# Patient Record
Sex: FEMALE | Employment: OTHER
[De-identification: names, ages, dates, MRNs, and addresses within clinical notes are randomized per-mention and may not be internally consistent; named-entity substitution may affect disease eponyms.]

---

## 2017-01-26 ENCOUNTER — CHARTING TRANS (OUTPATIENT)
Dept: OTHER | Age: 82
End: 2017-01-26

## 2017-02-01 ENCOUNTER — CHARTING TRANS (OUTPATIENT)
Dept: OTHER | Age: 82
End: 2017-02-01

## 2017-02-27 ENCOUNTER — CHARTING TRANS (OUTPATIENT)
Dept: OTHER | Age: 82
End: 2017-02-27

## 2017-05-01 ENCOUNTER — LAB SERVICES (OUTPATIENT)
Dept: OTHER | Age: 82
End: 2017-05-01

## 2017-05-02 ENCOUNTER — CHARTING TRANS (OUTPATIENT)
Dept: OTHER | Age: 82
End: 2017-05-02

## 2017-05-03 ENCOUNTER — LAB SERVICES (OUTPATIENT)
Dept: OTHER | Age: 82
End: 2017-05-03

## 2017-05-03 ENCOUNTER — CHARTING TRANS (OUTPATIENT)
Dept: OTHER | Age: 82
End: 2017-05-03

## 2017-05-03 LAB
CULTURE URINE: NORMAL
Lab: NORMAL
RAPID FLU PCR A AND B: NORMAL

## 2017-05-05 LAB — CULTURE GROUP A STREP: NORMAL

## 2017-05-06 LAB
CULTURE BLOOD: NORMAL
CULTURE BLOOD: NORMAL

## 2017-05-18 ENCOUNTER — CHARTING TRANS (OUTPATIENT)
Dept: OTHER | Age: 82
End: 2017-05-18

## 2017-05-22 ENCOUNTER — CHARTING TRANS (OUTPATIENT)
Dept: OTHER | Age: 82
End: 2017-05-22

## 2017-05-26 ENCOUNTER — CHARTING TRANS (OUTPATIENT)
Dept: OTHER | Age: 82
End: 2017-05-26

## 2017-05-31 ENCOUNTER — CHARTING TRANS (OUTPATIENT)
Dept: OTHER | Age: 82
End: 2017-05-31

## 2017-06-02 ENCOUNTER — CHARTING TRANS (OUTPATIENT)
Dept: OTHER | Age: 82
End: 2017-06-02

## 2017-06-07 ENCOUNTER — CHARTING TRANS (OUTPATIENT)
Dept: OTHER | Age: 82
End: 2017-06-07

## 2017-06-09 ENCOUNTER — CHARTING TRANS (OUTPATIENT)
Dept: OTHER | Age: 82
End: 2017-06-09

## 2018-11-23 ENCOUNTER — IMAGING SERVICES (OUTPATIENT)
Dept: OTHER | Age: 83
End: 2018-11-23

## 2018-11-28 VITALS
BODY MASS INDEX: 30.91 KG/M2 | WEIGHT: 168 LBS | OXYGEN SATURATION: 98 % | DIASTOLIC BLOOD PRESSURE: 63 MMHG | TEMPERATURE: 97.6 F | RESPIRATION RATE: 18 BRPM | HEIGHT: 62 IN | SYSTOLIC BLOOD PRESSURE: 109 MMHG | HEART RATE: 86 BPM

## 2018-11-28 VITALS
DIASTOLIC BLOOD PRESSURE: 68 MMHG | TEMPERATURE: 97.2 F | OXYGEN SATURATION: 98 % | SYSTOLIC BLOOD PRESSURE: 132 MMHG | RESPIRATION RATE: 18 BRPM | HEART RATE: 79 BPM

## 2018-11-28 VITALS
BODY MASS INDEX: 30.91 KG/M2 | WEIGHT: 168 LBS | TEMPERATURE: 98 F | DIASTOLIC BLOOD PRESSURE: 71 MMHG | RESPIRATION RATE: 14 BRPM | OXYGEN SATURATION: 98 % | HEIGHT: 62 IN | HEART RATE: 92 BPM | SYSTOLIC BLOOD PRESSURE: 130 MMHG

## 2018-11-28 VITALS
WEIGHT: 154 LBS | RESPIRATION RATE: 20 BRPM | HEART RATE: 86 BPM | HEIGHT: 62 IN | TEMPERATURE: 98.2 F | OXYGEN SATURATION: 97 % | DIASTOLIC BLOOD PRESSURE: 67 MMHG | BODY MASS INDEX: 28.34 KG/M2 | SYSTOLIC BLOOD PRESSURE: 131 MMHG

## 2018-11-28 VITALS
OXYGEN SATURATION: 98 % | DIASTOLIC BLOOD PRESSURE: 64 MMHG | HEART RATE: 74 BPM | SYSTOLIC BLOOD PRESSURE: 114 MMHG | RESPIRATION RATE: 20 BRPM | TEMPERATURE: 97.8 F

## 2018-11-28 VITALS
OXYGEN SATURATION: 97 % | DIASTOLIC BLOOD PRESSURE: 70 MMHG | RESPIRATION RATE: 18 BRPM | HEIGHT: 62 IN | SYSTOLIC BLOOD PRESSURE: 135 MMHG | HEART RATE: 80 BPM | BODY MASS INDEX: 28.34 KG/M2 | WEIGHT: 154 LBS | TEMPERATURE: 97.4 F

## 2018-12-13 ENCOUNTER — TELEPHONE (OUTPATIENT)
Dept: OPHTHALMOLOGY | Age: 83
End: 2018-12-13

## 2018-12-13 ENCOUNTER — OFFICE VISIT (OUTPATIENT)
Dept: OPHTHALMOLOGY | Age: 83
End: 2018-12-13

## 2018-12-13 DIAGNOSIS — H02.89 DISTICHIASIS: Primary | ICD-10-CM

## 2018-12-13 DIAGNOSIS — Z96.1 PSEUDOPHAKIA OF BOTH EYES: ICD-10-CM

## 2018-12-13 PROCEDURE — 67820 REVISE EYELASHES: CPT | Performed by: OPHTHALMOLOGY

## 2018-12-13 PROCEDURE — 92004 COMPRE OPH EXAM NEW PT 1/>: CPT | Performed by: OPHTHALMOLOGY

## 2018-12-13 RX ORDER — ALBUTEROL SULFATE 2.5 MG/3ML
SOLUTION RESPIRATORY (INHALATION)
COMMUNITY

## 2018-12-13 RX ORDER — FUROSEMIDE 20 MG/1
20 TABLET ORAL
COMMUNITY

## 2018-12-13 RX ORDER — IPRATROPIUM BROMIDE 42 UG/1
2 SPRAY, METERED NASAL
COMMUNITY
Start: 2018-04-25

## 2018-12-13 RX ORDER — POTASSIUM CHLORIDE 750 MG/1
10 TABLET, EXTENDED RELEASE ORAL
COMMUNITY

## 2018-12-28 ENCOUNTER — OFFICE VISIT (OUTPATIENT)
Dept: OPHTHALMOLOGY | Age: 83
End: 2018-12-28

## 2018-12-28 DIAGNOSIS — H02.89 DISTICHIASIS: Primary | ICD-10-CM

## 2018-12-28 PROCEDURE — 99213 OFFICE O/P EST LOW 20 MIN: CPT | Performed by: OPHTHALMOLOGY

## 2019-01-02 ENCOUNTER — TELEPHONE (OUTPATIENT)
Dept: OPHTHALMOLOGY | Age: 84
End: 2019-01-02

## 2019-01-24 ENCOUNTER — IMAGING SERVICES (OUTPATIENT)
Dept: OTHER | Age: 84
End: 2019-01-24

## 2019-02-19 ENCOUNTER — APPOINTMENT (OUTPATIENT)
Dept: OPHTHALMOLOGY | Age: 84
End: 2019-02-19

## 2019-02-20 ENCOUNTER — APPOINTMENT (OUTPATIENT)
Dept: OPHTHALMOLOGY | Age: 84
End: 2019-02-20

## 2019-05-20 ENCOUNTER — TELEPHONE (OUTPATIENT)
Dept: OPHTHALMOLOGY | Age: 84
End: 2019-05-20

## 2019-05-30 ENCOUNTER — OFFICE VISIT (OUTPATIENT)
Dept: OPHTHALMOLOGY | Age: 84
End: 2019-05-30

## 2019-05-30 DIAGNOSIS — H02.89 DISTICHIASIS: Primary | ICD-10-CM

## 2019-05-30 PROCEDURE — 99213 OFFICE O/P EST LOW 20 MIN: CPT | Performed by: OPHTHALMOLOGY

## 2019-06-26 ENCOUNTER — OFFICE VISIT (OUTPATIENT)
Dept: OPHTHALMOLOGY | Age: 84
End: 2019-06-26

## 2019-06-26 DIAGNOSIS — H02.89 DISTICHIASIS: Primary | ICD-10-CM

## 2019-06-26 PROCEDURE — 99212 OFFICE O/P EST SF 10 MIN: CPT | Performed by: OPHTHALMOLOGY

## 2020-12-02 ENCOUNTER — TELEPHONE (OUTPATIENT)
Dept: RHEUMATOLOGY | Age: 85
End: 2020-12-02

## 2021-06-07 ENCOUNTER — TELEPHONE (OUTPATIENT)
Dept: OPHTHALMOLOGY | Age: 86
End: 2021-06-07

## 2021-06-09 ENCOUNTER — OFFICE VISIT (OUTPATIENT)
Dept: OPTOMETRY | Age: 86
End: 2021-06-09

## 2021-06-09 DIAGNOSIS — H02.89 DISTICHIASIS: Primary | ICD-10-CM

## 2021-06-09 PROCEDURE — 99202 OFFICE O/P NEW SF 15 MIN: CPT | Performed by: OPTOMETRIST

## 2021-06-09 ASSESSMENT — REFRACTION_WEARINGRX
OS_SPHERE: -0.75
OD_AXIS: 001
OD_SPHERE: -0.75
OS_CYLINDER: +1.50
OD_CYLINDER: +1.25
OS_AXIS: 005
SPECS_TYPE: SINGLE VISION

## 2021-06-09 ASSESSMENT — VISUAL ACUITY
CORRECTION_TYPE: GLASSES
OS_PH_CC: 20/NI
METHOD: SNELLEN - LINEAR
OS_CC: 20/80
OS_CC+: +2
OD_PH_CC: 20/NI
OD_CC: 20/CF

## 2021-06-09 ASSESSMENT — EXTERNAL EXAM - LEFT EYE: OS_EXAM: NORMAL

## 2021-06-09 ASSESSMENT — EXTERNAL EXAM - RIGHT EYE: OD_EXAM: NORMAL

## 2021-06-17 ENCOUNTER — TELEPHONE (OUTPATIENT)
Dept: OPTOMETRY | Age: 86
End: 2021-06-17

## 2021-06-18 ENCOUNTER — OFFICE VISIT (OUTPATIENT)
Dept: OPTOMETRY | Age: 86
End: 2021-06-18

## 2021-06-18 DIAGNOSIS — H02.056 TRICHIASIS OF LEFT EYE WITHOUT ENTROPION: ICD-10-CM

## 2021-06-18 DIAGNOSIS — H02.053 TRICHIASIS OF RIGHT EYE WITHOUT ENTROPION: ICD-10-CM

## 2021-06-18 DIAGNOSIS — S05.8X2A SUPERFICIAL INJURY OF LEFT CORNEA, INITIAL ENCOUNTER: Primary | ICD-10-CM

## 2021-06-18 PROCEDURE — 99213 OFFICE O/P EST LOW 20 MIN: CPT | Performed by: OPTOMETRIST

## 2021-06-18 RX ORDER — OFLOXACIN 3 MG/ML
SOLUTION/ DROPS OPHTHALMIC
Qty: 5 ML | Refills: 0 | Status: SHIPPED | OUTPATIENT
Start: 2021-06-18

## 2021-06-18 ASSESSMENT — VISUAL ACUITY
OD_SC+: -2
OD_SC: 20/60
OS_SC: 20/80
METHOD: SNELLEN - LINEAR

## 2021-07-19 ENCOUNTER — TELEPHONE (OUTPATIENT)
Dept: OPTOMETRY | Age: 86
End: 2021-07-19

## 2021-07-20 ENCOUNTER — OFFICE VISIT (OUTPATIENT)
Dept: OPHTHALMOLOGY | Age: 86
End: 2021-07-20

## 2021-07-20 ENCOUNTER — OFFICE VISIT (OUTPATIENT)
Dept: OPTOMETRY | Age: 86
End: 2021-07-20

## 2021-07-20 DIAGNOSIS — S05.02XA ABRASION OF LEFT CORNEA, INITIAL ENCOUNTER: Primary | ICD-10-CM

## 2021-07-20 DIAGNOSIS — T15.11XA FOREIGN BODY OF RIGHT CONJUNCTIVAL SAC, INITIAL ENCOUNTER: ICD-10-CM

## 2021-07-20 DIAGNOSIS — S05.01XA ABRASION OF RIGHT CORNEA, INITIAL ENCOUNTER: Primary | ICD-10-CM

## 2021-07-20 DIAGNOSIS — H02.056 TRICHIASIS OF LEFT EYE WITHOUT ENTROPION: ICD-10-CM

## 2021-07-20 PROCEDURE — 99214 OFFICE O/P EST MOD 30 MIN: CPT | Performed by: OPHTHALMOLOGY

## 2021-07-20 PROCEDURE — 65210 REMOVE FOREIGN BODY FROM EYE: CPT | Performed by: OPHTHALMOLOGY

## 2021-07-20 PROCEDURE — 99212 OFFICE O/P EST SF 10 MIN: CPT | Performed by: OPTOMETRIST

## 2021-07-20 ASSESSMENT — SLIT LAMP EXAM - LIDS: COMMENTS: NORMAL

## 2021-07-20 ASSESSMENT — VISUAL ACUITY
OS_CC+: +2
OS_CC: 20/80
METHOD: SNELLEN - LINEAR
CORRECTION_TYPE: GLASSES

## 2021-07-23 ENCOUNTER — OFFICE VISIT (OUTPATIENT)
Dept: OPHTHALMOLOGY | Age: 86
End: 2021-07-23

## 2021-07-23 DIAGNOSIS — H02.89 DISTICHIASIS: Primary | ICD-10-CM

## 2021-07-23 PROCEDURE — 99213 OFFICE O/P EST LOW 20 MIN: CPT | Performed by: OPHTHALMOLOGY

## 2021-07-29 ENCOUNTER — TELEPHONE (OUTPATIENT)
Dept: OPTOMETRY | Age: 86
End: 2021-07-29

## 2021-07-29 ENCOUNTER — OFFICE VISIT (OUTPATIENT)
Dept: OPHTHALMOLOGY | Age: 86
End: 2021-07-29

## 2021-07-29 DIAGNOSIS — H02.053 TRICHIASIS OF RIGHT EYE WITHOUT ENTROPION: ICD-10-CM

## 2021-07-29 DIAGNOSIS — H02.056 TRICHIASIS OF LEFT EYE WITHOUT ENTROPION: Primary | ICD-10-CM

## 2021-07-29 PROCEDURE — 99213 OFFICE O/P EST LOW 20 MIN: CPT | Performed by: OPHTHALMOLOGY

## 2021-07-29 PROCEDURE — 67820 REVISE EYELASHES: CPT | Performed by: OPHTHALMOLOGY

## 2021-11-03 ENCOUNTER — OFFICE VISIT (OUTPATIENT)
Dept: OPTOMETRY | Age: 86
End: 2021-11-03

## 2021-11-03 ENCOUNTER — APPOINTMENT (OUTPATIENT)
Dept: OPTOMETRY | Age: 86
End: 2021-11-03

## 2021-11-03 ENCOUNTER — TELEPHONE (OUTPATIENT)
Dept: OPTOMETRY | Age: 86
End: 2021-11-03

## 2021-11-03 DIAGNOSIS — H02.056 TRICHIASIS OF LEFT EYE WITHOUT ENTROPION: Primary | ICD-10-CM

## 2021-11-03 DIAGNOSIS — H02.053 TRICHIASIS OF RIGHT EYE WITHOUT ENTROPION: ICD-10-CM

## 2021-11-03 PROCEDURE — 67820 REVISE EYELASHES: CPT | Performed by: OPTOMETRIST

## 2021-11-03 ASSESSMENT — VISUAL ACUITY
OD_CC: CF
METHOD: SNELLEN - LINEAR
OS_CC: 20/400

## 2021-11-03 ASSESSMENT — EXTERNAL EXAM - RIGHT EYE: OD_EXAM: NORMAL

## 2022-06-02 ENCOUNTER — OFFICE VISIT (OUTPATIENT)
Dept: OPHTHALMOLOGY | Age: 87
End: 2022-06-02

## 2022-06-02 DIAGNOSIS — H02.054 TRICHIASIS OF LEFT UPPER EYELID: Primary | ICD-10-CM

## 2022-06-02 DIAGNOSIS — Z96.1 PSEUDOPHAKIA OF BOTH EYES: ICD-10-CM

## 2022-06-02 DIAGNOSIS — H26.491 PCO (POSTERIOR CAPSULAR OPACIFICATION), RIGHT: ICD-10-CM

## 2022-06-02 DIAGNOSIS — H02.889 MGD (MEIBOMIAN GLAND DYSFUNCTION): ICD-10-CM

## 2022-06-02 PROCEDURE — 99213 OFFICE O/P EST LOW 20 MIN: CPT | Performed by: OPHTHALMOLOGY

## 2022-06-02 PROCEDURE — 67820 REVISE EYELASHES: CPT | Performed by: OPHTHALMOLOGY

## 2022-06-02 ASSESSMENT — REFRACTION_WEARINGRX
OS_AXIS: 004
OD_SPHERE: -0.50
SPECS_TYPE: DISTANCE SINGLE VISION
OD_AXIS: 178
OD_CYLINDER: +1.00
OS_SPHERE: -0.50
OS_CYLINDER: +1.25

## 2022-06-02 ASSESSMENT — CONF VISUAL FIELD
OD_NORMAL: 1
METHOD: COUNTING FINGERS
OS_NORMAL: 1

## 2022-06-02 ASSESSMENT — VISUAL ACUITY
OS_CC: 20/50
OD_PH_CC: 20/50
CORRECTION_TYPE: GLASSES
OS_PH_CC: 20/NA
METHOD: SNELLEN - LINEAR
OD_CC: 20/80
OD_PH_CC+: -1

## 2022-06-02 ASSESSMENT — SLIT LAMP EXAM - LIDS: COMMENTS: MEIBOMIAN GLAND DYSFUNCTION

## 2022-06-02 ASSESSMENT — EXTERNAL EXAM - RIGHT EYE: OD_EXAM: NORMAL

## 2022-06-02 ASSESSMENT — EXTERNAL EXAM - LEFT EYE: OS_EXAM: NORMAL

## 2022-08-03 ENCOUNTER — APPOINTMENT (OUTPATIENT)
Dept: OPHTHALMOLOGY | Age: 87
End: 2022-08-03

## 2022-09-29 ENCOUNTER — TELEPHONE (OUTPATIENT)
Dept: OPHTHALMOLOGY | Age: 87
End: 2022-09-29

## 2023-06-05 ENCOUNTER — WALK IN (OUTPATIENT)
Dept: URGENT CARE | Age: 88
End: 2023-06-05

## 2023-06-05 VITALS
BODY MASS INDEX: 27.6 KG/M2 | HEART RATE: 96 BPM | RESPIRATION RATE: 16 BRPM | DIASTOLIC BLOOD PRESSURE: 80 MMHG | TEMPERATURE: 97.8 F | WEIGHT: 150 LBS | HEIGHT: 62 IN | SYSTOLIC BLOOD PRESSURE: 130 MMHG

## 2023-06-05 DIAGNOSIS — H61.22 IMPACTED CERUMEN OF LEFT EAR: Primary | ICD-10-CM

## 2023-06-05 DIAGNOSIS — H92.09 EARACHE: ICD-10-CM

## 2023-06-05 PROCEDURE — 99202 OFFICE O/P NEW SF 15 MIN: CPT | Performed by: NURSE PRACTITIONER

## 2023-06-05 RX ORDER — NEOMYCIN SULFATE, POLYMYXIN B SULFATE AND GRAMICIDIN 1.75; 10000; .025 MG/ML; [USP'U]/ML; MG/ML
SOLUTION/ DROPS OPHTHALMIC
COMMUNITY
Start: 2023-05-23

## 2023-06-05 RX ORDER — CARVEDILOL 3.12 MG/1
TABLET ORAL
COMMUNITY

## 2023-06-05 RX ORDER — CITALOPRAM HYDROBROMIDE 10 MG/1
TABLET ORAL
COMMUNITY
Start: 2023-05-17

## 2023-06-05 RX ORDER — ESTRADIOL 0.1 MG/G
1 CREAM VAGINAL DAILY
COMMUNITY

## 2023-06-05 RX ORDER — SIMVASTATIN 20 MG
20 TABLET ORAL
COMMUNITY

## 2023-06-05 RX ORDER — CYANOCOBALAMIN 1000 UG/ML
INJECTION, SOLUTION INTRAMUSCULAR; SUBCUTANEOUS
COMMUNITY

## 2023-06-05 ASSESSMENT — ENCOUNTER SYMPTOMS
LIGHT-HEADEDNESS: 0
UNEXPECTED WEIGHT CHANGE: 0
FACIAL SWELLING: 0
DIZZINESS: 0
VOMITING: 0
COUGH: 0
SINUS PRESSURE: 0
RHINORRHEA: 0
DIAPHORESIS: 0
TROUBLE SWALLOWING: 0
FATIGUE: 0
EYES NEGATIVE: 1
FEVER: 0
ACTIVITY CHANGE: 0
HEADACHES: 0
CHILLS: 0
ABDOMINAL PAIN: 0
SINUS PAIN: 0
APPETITE CHANGE: 0
SORE THROAT: 0
DIARRHEA: 0

## 2023-06-05 ASSESSMENT — PAIN SCALES - GENERAL: PAINLEVEL: 2

## 2023-12-10 ENCOUNTER — HOSPITAL ENCOUNTER (EMERGENCY)
Age: 88
Discharge: HOME | End: 2023-12-10
Attending: EMERGENCY MEDICINE
Payer: MEDICARE

## 2023-12-10 ENCOUNTER — APPOINTMENT (OUTPATIENT)
Dept: GENERAL RADIOLOGY | Age: 88
End: 2023-12-10
Payer: MEDICARE

## 2023-12-10 VITALS
OXYGEN SATURATION: 98 % | HEIGHT: 62 IN | TEMPERATURE: 99 F | HEART RATE: 75 BPM | SYSTOLIC BLOOD PRESSURE: 126 MMHG | RESPIRATION RATE: 18 BRPM | WEIGHT: 151 LBS | BODY MASS INDEX: 27.79 KG/M2 | DIASTOLIC BLOOD PRESSURE: 57 MMHG

## 2023-12-10 DIAGNOSIS — F41.8 SITUATIONAL ANXIETY: ICD-10-CM

## 2023-12-10 DIAGNOSIS — R07.89 CHEST PAIN, ATYPICAL: Primary | ICD-10-CM

## 2023-12-10 LAB
ALBUMIN SERPL-MCNC: 3.3 G/DL (ref 3.4–5)
ALBUMIN/GLOB SERPL: 0.8 {RATIO} (ref 1–2)
ALP LIVER SERPL-CCNC: 70 U/L
ALT SERPL-CCNC: 14 U/L
ANION GAP SERPL CALC-SCNC: 4 MMOL/L (ref 0–18)
AST SERPL-CCNC: 18 U/L (ref 15–37)
ATRIAL RATE: 79 BPM
BASOPHILS # BLD AUTO: 0.05 X10(3) UL (ref 0–0.2)
BASOPHILS NFR BLD AUTO: 0.8 %
BILIRUB SERPL-MCNC: 0.3 MG/DL (ref 0.1–2)
BUN BLD-MCNC: 19 MG/DL (ref 9–23)
CALCIUM BLD-MCNC: 9 MG/DL (ref 8.5–10.1)
CHLORIDE SERPL-SCNC: 104 MMOL/L (ref 98–112)
CO2 SERPL-SCNC: 26 MMOL/L (ref 21–32)
CREAT BLD-MCNC: 1.27 MG/DL
EGFRCR SERPLBLD CKD-EPI 2021: 39 ML/MIN/1.73M2 (ref 60–?)
EOSINOPHIL # BLD AUTO: 0.29 X10(3) UL (ref 0–0.7)
EOSINOPHIL NFR BLD AUTO: 4.9 %
ERYTHROCYTE [DISTWIDTH] IN BLOOD BY AUTOMATED COUNT: 13.5 %
GLOBULIN PLAS-MCNC: 4.1 G/DL (ref 2.8–4.4)
GLUCOSE BLD-MCNC: 105 MG/DL (ref 70–99)
HCT VFR BLD AUTO: 33.2 %
HGB BLD-MCNC: 11 G/DL
IMM GRANULOCYTES # BLD AUTO: 0.05 X10(3) UL (ref 0–1)
IMM GRANULOCYTES NFR BLD: 0.8 %
LYMPHOCYTES # BLD AUTO: 2.71 X10(3) UL (ref 1–4)
LYMPHOCYTES NFR BLD AUTO: 46 %
MCH RBC QN AUTO: 31 PG (ref 26–34)
MCHC RBC AUTO-ENTMCNC: 33.1 G/DL (ref 31–37)
MCV RBC AUTO: 93.5 FL
MONOCYTES # BLD AUTO: 0.47 X10(3) UL (ref 0.1–1)
MONOCYTES NFR BLD AUTO: 8 %
NEUTROPHILS # BLD AUTO: 2.32 X10 (3) UL (ref 1.5–7.7)
NEUTROPHILS # BLD AUTO: 2.32 X10(3) UL (ref 1.5–7.7)
NEUTROPHILS NFR BLD AUTO: 39.5 %
OSMOLALITY SERPL CALC.SUM OF ELEC: 281 MOSM/KG (ref 275–295)
P AXIS: 34 DEGREES
P-R INTERVAL: 170 MS
PLATELET # BLD AUTO: 198 10(3)UL (ref 150–450)
POTASSIUM SERPL-SCNC: 4.4 MMOL/L (ref 3.5–5.1)
PROT SERPL-MCNC: 7.4 G/DL (ref 6.4–8.2)
Q-T INTERVAL: 370 MS
QRS DURATION: 82 MS
QTC CALCULATION (BEZET): 424 MS
R AXIS: 31 DEGREES
RBC # BLD AUTO: 3.55 X10(6)UL
SODIUM SERPL-SCNC: 134 MMOL/L (ref 136–145)
T AXIS: 53 DEGREES
TROPONIN I SERPL HS-MCNC: 24 NG/L
VENTRICULAR RATE: 79 BPM
WBC # BLD AUTO: 5.9 X10(3) UL (ref 4–11)

## 2023-12-10 PROCEDURE — 96374 THER/PROPH/DIAG INJ IV PUSH: CPT

## 2023-12-10 PROCEDURE — 93005 ELECTROCARDIOGRAM TRACING: CPT

## 2023-12-10 PROCEDURE — 80053 COMPREHEN METABOLIC PANEL: CPT | Performed by: EMERGENCY MEDICINE

## 2023-12-10 PROCEDURE — 99284 EMERGENCY DEPT VISIT MOD MDM: CPT

## 2023-12-10 PROCEDURE — 71045 X-RAY EXAM CHEST 1 VIEW: CPT | Performed by: EMERGENCY MEDICINE

## 2023-12-10 PROCEDURE — 93010 ELECTROCARDIOGRAM REPORT: CPT

## 2023-12-10 PROCEDURE — 84484 ASSAY OF TROPONIN QUANT: CPT | Performed by: EMERGENCY MEDICINE

## 2023-12-10 PROCEDURE — 99285 EMERGENCY DEPT VISIT HI MDM: CPT

## 2023-12-10 PROCEDURE — 85025 COMPLETE CBC W/AUTO DIFF WBC: CPT | Performed by: EMERGENCY MEDICINE

## 2023-12-10 RX ORDER — FUROSEMIDE 10 MG/ML
20 INJECTION INTRAMUSCULAR; INTRAVENOUS ONCE
Status: COMPLETED | OUTPATIENT
Start: 2023-12-10 | End: 2023-12-10

## 2023-12-10 NOTE — DISCHARGE INSTRUCTIONS
Take 1000 mg acetaminophen (2 Tylenol tablets) every 6 hours as needed for pain. Increase the Lasix from 20 to 40 mg in the morning.

## 2024-04-16 ENCOUNTER — HOSPITAL ENCOUNTER (INPATIENT)
Facility: HOSPITAL | Age: 89
LOS: 4 days | Discharge: HOME HEALTH | End: 2024-04-21
Attending: EMERGENCY MEDICINE | Admitting: HOSPITALIST
Payer: MEDICARE

## 2024-04-16 ENCOUNTER — APPOINTMENT (OUTPATIENT)
Dept: GENERAL RADIOLOGY | Facility: HOSPITAL | Age: 89
End: 2024-04-16
Attending: EMERGENCY MEDICINE
Payer: MEDICARE

## 2024-04-16 DIAGNOSIS — D64.9 ANEMIA, UNSPECIFIED TYPE: ICD-10-CM

## 2024-04-16 DIAGNOSIS — I50.9 ACUTE ON CHRONIC CONGESTIVE HEART FAILURE, UNSPECIFIED HEART FAILURE TYPE (HCC): ICD-10-CM

## 2024-04-16 DIAGNOSIS — I48.91 ATRIAL FIBRILLATION WITH RAPID VENTRICULAR RESPONSE (HCC): Primary | ICD-10-CM

## 2024-04-16 LAB
ALBUMIN SERPL-MCNC: 3.4 G/DL (ref 3.4–5)
ALBUMIN/GLOB SERPL: 0.7 {RATIO} (ref 1–2)
ALP LIVER SERPL-CCNC: 82 U/L
ALT SERPL-CCNC: 19 U/L
ANION GAP SERPL CALC-SCNC: 10 MMOL/L (ref 0–18)
APTT PPP: 43.8 SECONDS (ref 23.3–35.6)
AST SERPL-CCNC: 21 U/L (ref 15–37)
BASOPHILS # BLD AUTO: 0.05 X10(3) UL (ref 0–0.2)
BASOPHILS NFR BLD AUTO: 0.9 %
BILIRUB SERPL-MCNC: 0.3 MG/DL (ref 0.1–2)
BUN BLD-MCNC: 20 MG/DL (ref 9–23)
CALCIUM BLD-MCNC: 9.1 MG/DL (ref 8.5–10.1)
CHLORIDE SERPL-SCNC: 104 MMOL/L (ref 98–112)
CO2 SERPL-SCNC: 19 MMOL/L (ref 21–32)
CREAT BLD-MCNC: 1.17 MG/DL
EGFRCR SERPLBLD CKD-EPI 2021: 43 ML/MIN/1.73M2 (ref 60–?)
EOSINOPHIL # BLD AUTO: 0.31 X10(3) UL (ref 0–0.7)
EOSINOPHIL NFR BLD AUTO: 5.4 %
ERYTHROCYTE [DISTWIDTH] IN BLOOD BY AUTOMATED COUNT: 13.3 %
GLOBULIN PLAS-MCNC: 4.7 G/DL (ref 2.8–4.4)
GLUCOSE BLD-MCNC: 172 MG/DL (ref 70–99)
HCT VFR BLD AUTO: 34 %
HGB BLD-MCNC: 11.6 G/DL
IMM GRANULOCYTES # BLD AUTO: 0.04 X10(3) UL (ref 0–1)
IMM GRANULOCYTES NFR BLD: 0.7 %
INR BLD: 1.09 (ref 0.8–1.2)
LYMPHOCYTES # BLD AUTO: 2.22 X10(3) UL (ref 1–4)
LYMPHOCYTES NFR BLD AUTO: 38.7 %
MAGNESIUM SERPL-MCNC: 2.1 MG/DL (ref 1.6–2.6)
MCH RBC QN AUTO: 31.6 PG (ref 26–34)
MCHC RBC AUTO-ENTMCNC: 34.1 G/DL (ref 31–37)
MCV RBC AUTO: 92.6 FL
MONOCYTES # BLD AUTO: 0.35 X10(3) UL (ref 0.1–1)
MONOCYTES NFR BLD AUTO: 6.1 %
NEUTROPHILS # BLD AUTO: 2.76 X10 (3) UL (ref 1.5–7.7)
NEUTROPHILS # BLD AUTO: 2.76 X10(3) UL (ref 1.5–7.7)
NEUTROPHILS NFR BLD AUTO: 48.2 %
NT-PROBNP SERPL-MCNC: 5950 PG/ML (ref ?–450)
OSMOLALITY SERPL CALC.SUM OF ELEC: 283 MOSM/KG (ref 275–295)
PLATELET # BLD AUTO: 197 10(3)UL (ref 150–450)
POTASSIUM SERPL-SCNC: 4.1 MMOL/L (ref 3.5–5.1)
PROT SERPL-MCNC: 8.1 G/DL (ref 6.4–8.2)
PROTHROMBIN TIME: 14.2 SECONDS (ref 11.6–14.8)
RBC # BLD AUTO: 3.67 X10(6)UL
SODIUM SERPL-SCNC: 133 MMOL/L (ref 136–145)
TROPONIN I SERPL HS-MCNC: 11 NG/L
TSI SER-ACNC: 2.94 MIU/ML (ref 0.36–3.74)
WBC # BLD AUTO: 5.7 X10(3) UL (ref 4–11)

## 2024-04-16 PROCEDURE — 71045 X-RAY EXAM CHEST 1 VIEW: CPT | Performed by: EMERGENCY MEDICINE

## 2024-04-16 PROCEDURE — 99223 1ST HOSP IP/OBS HIGH 75: CPT | Performed by: HOSPITALIST

## 2024-04-16 RX ORDER — HEPARIN SODIUM AND DEXTROSE 10000; 5 [USP'U]/100ML; G/100ML
INJECTION INTRAVENOUS CONTINUOUS
Status: DISCONTINUED | OUTPATIENT
Start: 2024-04-17 | End: 2024-04-18

## 2024-04-16 RX ORDER — HEPARIN SODIUM 1000 [USP'U]/ML
60 INJECTION, SOLUTION INTRAVENOUS; SUBCUTANEOUS ONCE
Status: COMPLETED | OUTPATIENT
Start: 2024-04-16 | End: 2024-04-16

## 2024-04-16 RX ORDER — HEPARIN SODIUM AND DEXTROSE 10000; 5 [USP'U]/100ML; G/100ML
12 INJECTION INTRAVENOUS ONCE
Status: COMPLETED | OUTPATIENT
Start: 2024-04-16 | End: 2024-04-17

## 2024-04-16 RX ORDER — FUROSEMIDE 10 MG/ML
20 INJECTION INTRAMUSCULAR; INTRAVENOUS ONCE
Status: COMPLETED | OUTPATIENT
Start: 2024-04-16 | End: 2024-04-16

## 2024-04-17 PROBLEM — I50.9 ACUTE ON CHRONIC CONGESTIVE HEART FAILURE, UNSPECIFIED HEART FAILURE TYPE (HCC): Status: ACTIVE | Noted: 2024-04-17

## 2024-04-17 PROBLEM — D64.9 ANEMIA, UNSPECIFIED TYPE: Status: ACTIVE | Noted: 2024-04-17

## 2024-04-17 LAB
ANION GAP SERPL CALC-SCNC: 8 MMOL/L (ref 0–18)
APTT PPP: 115.8 SECONDS (ref 23.3–35.6)
APTT PPP: 48 SECONDS (ref 23.3–35.6)
APTT PPP: 98.5 SECONDS (ref 23.3–35.6)
BILIRUB UR QL STRIP.AUTO: NEGATIVE
BUN BLD-MCNC: 18 MG/DL (ref 9–23)
CALCIUM BLD-MCNC: 8.8 MG/DL (ref 8.5–10.1)
CHLORIDE SERPL-SCNC: 107 MMOL/L (ref 98–112)
CLARITY UR REFRACT.AUTO: CLEAR
CO2 SERPL-SCNC: 22 MMOL/L (ref 21–32)
COLOR UR AUTO: YELLOW
CREAT BLD-MCNC: 1.17 MG/DL
EGFRCR SERPLBLD CKD-EPI 2021: 43 ML/MIN/1.73M2 (ref 60–?)
ERYTHROCYTE [DISTWIDTH] IN BLOOD BY AUTOMATED COUNT: 13.2 %
EST. AVERAGE GLUCOSE BLD GHB EST-MCNC: 117 MG/DL (ref 68–126)
GLUCOSE BLD-MCNC: 109 MG/DL (ref 70–99)
GLUCOSE UR STRIP.AUTO-MCNC: NEGATIVE MG/DL
HBA1C MFR BLD: 5.7 % (ref ?–5.7)
HCT VFR BLD AUTO: 29.8 %
HGB BLD-MCNC: 10.5 G/DL
HYALINE CASTS #/AREA URNS AUTO: PRESENT /LPF
KETONES UR STRIP.AUTO-MCNC: NEGATIVE MG/DL
LEUKOCYTE ESTERASE UR QL STRIP.AUTO: NEGATIVE
MAGNESIUM SERPL-MCNC: 2 MG/DL (ref 1.6–2.6)
MCH RBC QN AUTO: 32.1 PG (ref 26–34)
MCHC RBC AUTO-ENTMCNC: 35.2 G/DL (ref 31–37)
MCV RBC AUTO: 91.1 FL
NITRITE UR QL STRIP.AUTO: NEGATIVE
OSMOLALITY SERPL CALC.SUM OF ELEC: 286 MOSM/KG (ref 275–295)
PH UR STRIP.AUTO: 5.5 [PH] (ref 5–8)
PLATELET # BLD AUTO: 185 10(3)UL (ref 150–450)
PLATELET # BLD AUTO: 185 10(3)UL (ref 150–450)
POTASSIUM SERPL-SCNC: 3.6 MMOL/L (ref 3.5–5.1)
POTASSIUM SERPL-SCNC: 5.1 MMOL/L (ref 3.5–5.1)
PROT UR STRIP.AUTO-MCNC: NEGATIVE MG/DL
Q-T INTERVAL: 278 MS
QRS DURATION: 82 MS
QTC CALCULATION (BEZET): 417 MS
R AXIS: 45 DEGREES
RBC # BLD AUTO: 3.27 X10(6)UL
SODIUM SERPL-SCNC: 137 MMOL/L (ref 136–145)
SP GR UR STRIP.AUTO: 1.01 (ref 1–1.03)
T AXIS: 217 DEGREES
UROBILINOGEN UR STRIP.AUTO-MCNC: 0.2 MG/DL
VENTRICULAR RATE: 135 BPM
WBC # BLD AUTO: 6.3 X10(3) UL (ref 4–11)

## 2024-04-17 PROCEDURE — 99233 SBSQ HOSP IP/OBS HIGH 50: CPT | Performed by: HOSPITALIST

## 2024-04-17 RX ORDER — POLYETHYLENE GLYCOL 3350 17 G/17G
17 POWDER, FOR SOLUTION ORAL DAILY PRN
Status: DISCONTINUED | OUTPATIENT
Start: 2024-04-17 | End: 2024-04-21

## 2024-04-17 RX ORDER — ASPIRIN 81 MG/1
81 TABLET ORAL DAILY
Status: DISCONTINUED | OUTPATIENT
Start: 2024-04-17 | End: 2024-04-21

## 2024-04-17 RX ORDER — ENEMA 19; 7 G/133ML; G/133ML
1 ENEMA RECTAL ONCE AS NEEDED
Status: DISCONTINUED | OUTPATIENT
Start: 2024-04-17 | End: 2024-04-21

## 2024-04-17 RX ORDER — METOPROLOL TARTRATE 50 MG/1
50 TABLET, FILM COATED ORAL
Status: DISCONTINUED | OUTPATIENT
Start: 2024-04-17 | End: 2024-04-19

## 2024-04-17 RX ORDER — ONDANSETRON 2 MG/ML
4 INJECTION INTRAMUSCULAR; INTRAVENOUS EVERY 6 HOURS PRN
Status: DISCONTINUED | OUTPATIENT
Start: 2024-04-17 | End: 2024-04-21

## 2024-04-17 RX ORDER — FUROSEMIDE 20 MG/1
20 TABLET ORAL DAILY
Status: DISCONTINUED | OUTPATIENT
Start: 2024-04-17 | End: 2024-04-17

## 2024-04-17 RX ORDER — METOCLOPRAMIDE HYDROCHLORIDE 5 MG/ML
5 INJECTION INTRAMUSCULAR; INTRAVENOUS EVERY 8 HOURS PRN
Status: DISCONTINUED | OUTPATIENT
Start: 2024-04-17 | End: 2024-04-21

## 2024-04-17 RX ORDER — METOCLOPRAMIDE HYDROCHLORIDE 5 MG/ML
10 INJECTION INTRAMUSCULAR; INTRAVENOUS EVERY 8 HOURS PRN
Status: DISCONTINUED | OUTPATIENT
Start: 2024-04-17 | End: 2024-04-17

## 2024-04-17 RX ORDER — ATORVASTATIN CALCIUM 10 MG/1
10 TABLET, FILM COATED ORAL NIGHTLY
Status: DISCONTINUED | OUTPATIENT
Start: 2024-04-17 | End: 2024-04-21

## 2024-04-17 RX ORDER — FUROSEMIDE 40 MG/1
40 TABLET ORAL DAILY
Status: DISCONTINUED | OUTPATIENT
Start: 2024-04-18 | End: 2024-04-21

## 2024-04-17 RX ORDER — ALBUTEROL SULFATE 2.5 MG/3ML
2.5 SOLUTION RESPIRATORY (INHALATION) EVERY 6 HOURS PRN
Status: DISCONTINUED | OUTPATIENT
Start: 2024-04-17 | End: 2024-04-21

## 2024-04-17 RX ORDER — POTASSIUM CHLORIDE 20 MEQ/1
40 TABLET, EXTENDED RELEASE ORAL EVERY 4 HOURS
Status: COMPLETED | OUTPATIENT
Start: 2024-04-17 | End: 2024-04-17

## 2024-04-17 RX ORDER — CARVEDILOL 3.12 MG/1
3.12 TABLET ORAL 2 TIMES DAILY
COMMUNITY
End: 2024-04-21

## 2024-04-17 RX ORDER — SENNOSIDES 8.6 MG
17.2 TABLET ORAL NIGHTLY PRN
Status: DISCONTINUED | OUTPATIENT
Start: 2024-04-17 | End: 2024-04-21

## 2024-04-17 RX ORDER — ACETAMINOPHEN 500 MG
500 TABLET ORAL EVERY 4 HOURS PRN
Status: DISCONTINUED | OUTPATIENT
Start: 2024-04-17 | End: 2024-04-18

## 2024-04-17 RX ORDER — BISACODYL 10 MG
10 SUPPOSITORY, RECTAL RECTAL
Status: DISCONTINUED | OUTPATIENT
Start: 2024-04-17 | End: 2024-04-21

## 2024-04-17 RX ORDER — DILTIAZEM HYDROCHLORIDE 5 MG/ML
10 INJECTION INTRAVENOUS ONCE
Status: COMPLETED | OUTPATIENT
Start: 2024-04-17 | End: 2024-04-17

## 2024-04-17 NOTE — ED QUICK NOTES
Orders for admission, patient is aware of plan and ready to go upstairs. Any questions, please call ED RN Caro at extension [phone].     Patient Covid vaccination status: Fully vaccinated     COVID Test Ordered in ED: None    COVID Suspicion at Admission: N/A    Running Infusions: per MAR     Mental Status/LOC at time of transport: A&Ox3    Other pertinent information:   CIWA score: N/A   NIH score:  N/A

## 2024-04-17 NOTE — H&P
[facility]     Giselle Weeks Patient Status:  Emergency    1929 MRN [id number]   Location [facility] EMERGENCY DEPARTMENT Attending Maureen Marlow MD   Hosp Day # 0 PCP Wicho Ward     Chief Complaint: Shortness of breath    Subjective:    History of Present Illness:     Giselle Weeks is a 89 year old female with a history of essential hypertension, dyslipidemia, chronic diastolic heart failure who presents with shortness of breath. Family provides history d/t language barrier. Patient has been short of breath for 2-3 days. She has been using a nebulizer without relief. Patient has been complaining of palpitations and fatigue. No chest pain. No syncope. Patient has been taking her medications. No change to medication therapy.     History/Other:    Past Medical History:  Past Medical History:    Congestive heart disease (HCC)     Past Surgical History:   Past Surgical History:   Procedure Laterality Date    Breast surgery      Total abdom hysterectomy        Family History:   No family history on file.  Social History:    reports that she has never smoked. She has never used smokeless tobacco. She reports that she does not drink alcohol.     Allergies:   Allergies   Allergen Reactions    Allopurinol OTHER (SEE COMMENTS)     Christian mederos    Penicillins OTHER (SEE COMMENTS)     unkown       Medications:    No current facility-administered medications on file prior to encounter.     Current Outpatient Medications on File Prior to Encounter   Medication Sig Dispense Refill    aspirin EC 81 MG Oral Tab EC Take 81 mg by mouth.      furosemide 20 MG Oral Tab Take 20 mg by mouth.      metoprolol Tartrate 25 MG Oral Tab Take 25 mg by mouth.      Potassium Chloride ER 10 MEQ Oral Tab CR Take 10 mEq by mouth.      simvastatin 20 MG Oral Tab Take 20 mg by mouth.      TraMADol HCl 50 MG Oral Tab Take 50 mg by mouth.      albuterol sulfate (2.5 MG/3ML) 0.083%  Inhalation Nebu Soln Take by nebulization every 6 (six) hours as needed for Wheezing.      Ipratropium Bromide 0.06 % Nasal Solution 2 sprays by Nasal route 4 (four) times daily. 1 Bottle 11       Review of Systems:   A comprehensive review of systems was completed.    Pertinent positives and negatives noted in the HPI.    Objective:   Physical Exam:    BP (!) 161/92   Pulse 116   Temp 97.7 °F (36.5 °C) (Temporal)   Resp 19   Ht 5' 2\" (1.575 m)   Wt 156 lb (70.8 kg)   SpO2 97%   BMI 28.53 kg/m²   General: No acute distress, Alert  Respiratory: Diminished  Cardiovascular: S1, S2. Irregular  Abdomen: Soft, Non-tender, non-distended, positive bowel sounds  Neuro: No new focal deficits  Extremities: No pitting edema    Results:    Labs:      Labs Last 24 Hours:    Recent Labs   Lab 04/16/24 2037   RBC 3.67*   HGB 11.6*   HCT 34.0*   MCV 92.6   MCH 31.6   MCHC 34.1   RDW 13.3   NEPRELIM 2.76   WBC 5.7   .0       Recent Labs   Lab 04/16/24 2037   *   BUN 20   CREATSERUM 1.17*   EGFRCR 43*   CA 9.1   ALB 3.4   *   K 4.1      CO2 19.0*   ALKPHO 82   AST 21   ALT 19   BILT 0.3   TP 8.1       Lab Results   Component Value Date    INR 1.09 04/16/2024       Recent Labs   Lab 04/16/24 2037   TROPHS 11       Recent Labs   Lab 04/16/24 2037   PBNP 5,950*       No results for input(s): \"PCT\" in the last 168 hours.    Imaging: Imaging data reviewed in Epic.    Assessment & Plan:      #Atrial fibrillation with RVR   #Essential hypertension  -Admit  -Cardizem drip  -Heparin drip  -Hold PTA Metoprolol and clarify if on tartrate once a day or succinate.  -TSH, ECHO  -Monitor hemodynamics  -Telemetry  -Cardiology consult    #Acute on chronic diastolic heart failure   -Lasix 20 IV x 1  -Resume Lasix 20 PO daily tomorrow    #Dyslipidemia  -Statin    #Hyperglycemia  -Check A1c    Plan of care discussed with patient, family and ER.    Clayton Anderson MD    Supplementary Documentation:     The 21st Century  Cures Act makes medical notes like these available to patients in the interest of transparency. Please be advised this is a medical document. Medical documents are intended to carry relevant information, facts as evident, and the clinical opinion of the practitioner. The medical note is intended as peer to peer communication and may appear blunt or direct. It is written in medical language and may contain abbreviations or verbiage that are unfamiliar.

## 2024-04-17 NOTE — ED INITIAL ASSESSMENT (HPI)
PT WITH SHORTNESS OF BREATH FOR COUPLE DAYS, PER FAMILY, PT USING NEBULIZER 3-4 TIMES A NIGHT. DENIES COUGH. NO RECENT ILLNESS/TRAVEL.

## 2024-04-17 NOTE — CM/SW NOTE
Betty met with pt and her daughter.  Pt sleeping. Daughter is aware that pt does not have prescription coverage.  Daughter states they just applied for Medicaid in [facility].  Explained that once Medicaid is approved- she should have coverage- more than likely may require a prior auth.    Pt can use 30 day free coupon fir first month- possibly get samples when she goes to OP Cardiology appointment.    Updated Greg COREA- he will discuss with Dr. Oconnell.    Melanie Barr, ELI  /Discharge Planner

## 2024-04-17 NOTE — PLAN OF CARE
Agree with Eliquis 30 day trial while patient attempts to obtain medicaid for long term medication coverage.  Discussed with RN team who notes continued elevated heart rates on 25 mg BID of metoprolol tartrate requiring IV diltiazem.  /63. Will increase metoprolol tartrate to 50 mg BID and continue to attempt to wean IV diltiazem.

## 2024-04-17 NOTE — PLAN OF CARE
Adalkirstyacacia Weeks Patient Status:  Inpatient    1929 MRN [id number]   Location [facility] 8NE-A Attending Clayton Anderson MD   Hosp Day # 0 PCP Wicho Ward       Cardiology Nocturnal APN Note    Page Received: Dr. Marlow, ED Physician    HPI:     Patient is a 89 year old male with PMH of HTN, anemia, arthritis, HFrEF  (echocardiogram in 2016-EF 52%/diastolic dysfunction with impaired relaxation) who presented to the ED with c/o dyspnea that has been worsening over the past few days. Pt denied chest pain or any other symptoms. EKG in ED showed pt with atrial fibrillation with RVR-rate in the 130s. Patient was given and Cardizem bolus and started on a Cardizem infusion. Pt also started on IV heparin infusion. BNP was also elevated and CXR showed fluid overload. [facility] consulted for new onset atrial fibrillation. HPI obtained from chart review and information provided by ED physician.         ED Clinical Course    EKG: As noted above in HPI    Labs: Na 133, Cr 1.17, BNP 5,950, Hgb 11.6    Imaging: CXR as noted above in HPI    Medications: Cardizem, Heparin and Lasix        Assessment/Plan:    - Titrate Cardizem for heart rate control as appropriate  - Continue IV heparin  - Continue diuresis and  monitor I/O  - 2D echocardiogram   - Continue to monitor overnight on telemetry  - Formal cardiology consult to follow in AM.       GALDINO Sam  [facility]  2024  3:14 AM

## 2024-04-17 NOTE — CM/SW NOTE
SW received order for pricing for [facility] pharmacy has the prescription.  Await pricing.    MUKESH BorrgeoW  /Discharge Planner

## 2024-04-17 NOTE — PLAN OF CARE
Pt alert and oriented x4. Pt on tele in Afib, asymptomatic.VSS. Denies any c/o of chest pain or shortness of breath. Pt on room air. Pt continent of bowel and bladder. Pt denies any c/o of pain. Heparin gtt running per orders. Cardizem gtt running per orders. Family at bedside with patient. Updated pt and family on plan of care, pt and family verbalizes understanding. Bed in lowest position and call light within reach.     Plan: Wean Cardizem gtt. Diuresis.     0850: Spoke with pt POA on the phone; completed PTA meds.     Problem: Patient/Family Goals  Goal: Patient/Family Long Term Goal  Description: Patient's Long Term Goal: stay out of hospital     Interventions:  - medication compliance  -follow up with primary care physician  - See additional Care Plan goals for specific interventions  Outcome: Progressing  Goal: Patient/Family Short Term Goal  Description: Patient's Short Term Goal: go home    Interventions:   - heparin gtt  - Cardizem gtt  -po Cardizem   - cardiology to see  - See additional Care Plan goals for specific interventions  Outcome: Progressing     Problem: CARDIOVASCULAR - ADULT  Goal: Maintains optimal cardiac output and hemodynamic stability  Description: INTERVENTIONS:  - Monitor vital signs, rhythm, and trends  - Monitor for bleeding, hypotension and signs of decreased cardiac output  - Evaluate effectiveness of vasoactive medications to optimize hemodynamic stability  - Monitor arterial and/or venous puncture sites for bleeding and/or hematoma  - Assess quality of pulses, skin color and temperature  - Assess for signs of decreased coronary artery perfusion - ex. Angina  - Evaluate fluid balance, assess for edema, trend weights  Outcome: Progressing  Goal: Absence of cardiac arrhythmias or at baseline  Description: INTERVENTIONS:  - Continuous cardiac monitoring, monitor vital signs, obtain 12 lead EKG if indicated  - Evaluate effectiveness of antiarrhythmic and heart rate control medications  as ordered  - Initiate emergency measures for life threatening arrhythmias  - Monitor electrolytes and administer replacement therapy as ordered  Outcome: Progressing

## 2024-04-17 NOTE — ED QUICK NOTES
Patient appears in NAD, RR even/NL, updated on POC, waiting for inpatient room assignment    Family at bedside    call light within reach, bed in low and locked position, on continuous cardiac monitoring, wctm closely.

## 2024-04-17 NOTE — CONSULTS
[facility]  Consultation    Giselle Weeks Patient Status:  Inpatient    1929 MRN [id number]   Location [facility] 8NE-A Attending Pamella Mccray MD   Hosp Day # 0 PCP Wicho Ward       Reason for consultation;  CHF  New Afib w/ RVR     HPI:  This is a 89 year old female patient with PMH of  CHF, HTN who presented with several days of SOB, and MAGANA. Hx is limited due to language barrier. Family is assisting at bedside, but her daughter is a poor historian as well. Reviewed EMR.    Currently patient feels better, and was able to sleep comfortably yesterday night.    MDM / Diagnostics reviewed & interpreted:  ECG showed Afib w/ RVR  Tele shows Afib, HR 80s  Trp negative  CXR with evidence of congestion  Pro BNP 5950    Assessment:    New Afib w/ RVR  CHF exaerbation  HTN    Plan:  -Currently rate controlled on low dose cardizem gtt. Start metoprolol. Wean down drip  -heparin gtt. Price out Eliquis 2.5mg PO BID  -Increase lasix to 40mg PO every day  -TTE pending      Discussed with patient.     Please call with questions. Thank you for your consult.    Level of care: C5      Radha Oconnell MD  Interventional Cardiology  [facility]    --------------------------------------------------------------------------------------------------------------------------------  ROS 10 systems reviewed, pertinent findings above.    History:  Past Medical History:    Congestive heart disease (HCC)     Past Surgical History:   Procedure Laterality Date    Breast surgery      Total abdom hysterectomy       No family history on file.   reports that she has never smoked. She has never used smokeless tobacco. She reports that she does not drink alcohol.    Objective:   Temp: 97.7 °F (36.5 °C)  Pulse: 77  Resp: 18  BP: 130/70    Intake/Output:     Intake/Output Summary (Last 24 hours) at 2024 0854  Last data filed at 2024 0519  Gross per 24 hour   Intake 110 ml   Output 180 ml   Net -70 ml        Physical Exam:     General: NAD. Conversant.   HEENT: Normocephalic, atraumatic.  Neck: Supple.  Cardiac: Irreg irreg. S1, S2 normal. No murmur.  Lungs: Diminished, no wheezing.  GI: Soft, no visible organomegaly.  Extremities: Radial pulses 2+ bilaterally. No edema.  Skin: Warm and dry.      Radha Oconnell MD  4/17/2024  8:54 AM

## 2024-04-17 NOTE — CM/SW NOTE
Sw spoke to [facility]- patient has no prescription coverage- so cash price for eliquis would be over $600.00 dollars.    Melanie Barr, LCSW  /Discharge Planner

## 2024-04-17 NOTE — ED PROVIDER NOTES
Patient Seen in: [facility] Emergency Department      History     Chief Complaint   Patient presents with    Difficulty Breathing     Stated Complaint: shortness of breath. language barrier    Subjective:   HPI    89-year-old female comes to the emergency department with her daughter for evaluation of increased difficulty breathing over the past couple of days.  No chest pain.  No recent fever, cough or colored sputum production.  No palpitations, lightheadedness or loss of consciousness.  She has a history remotely of congestive heart disease and is on carvedilol and Lasix.  These are her only medications.    Objective:   Past Medical History:    Congestive heart disease (HCC)              Past Surgical History:   Procedure Laterality Date    Breast surgery      Total abdom hysterectomy                  Social History     Socioeconomic History    Marital status: Single   Tobacco Use    Smoking status: Never    Smokeless tobacco: Never   Substance and Sexual Activity    Alcohol use: Never     Social Determinants of Health      Received from [facility]              Review of Systems    Positive for stated complaint: shortness of breath. language barrier  Other systems are as noted in HPI.  Constitutional and vital signs reviewed.      All other systems reviewed and negative except as noted above.    Physical Exam     ED Triage Vitals [04/16/24 2009]   BP (!) 154/95   Pulse (!) 135   Resp 22   Temp 97.7 °F (36.5 °C)   Temp src Temporal   SpO2 98 %   O2 Device None (Room air)       Current:BP (!) 152/97   Pulse 109   Temp 97.7 °F (36.5 °C) (Temporal)   Resp 24   Ht 157.5 cm (5' 2\")   Wt 70.8 kg   SpO2 97%   BMI 28.53 kg/m²         Physical Exam    General appearance: This is an elderly female who appears younger than her stated age.  Vital signs were reviewed per nurses notes.  Monitor revealed field atrial fibrillation with RVR rate between 110 and 140.  Initial blood pressure was  161/92.  HEENT: Normocephalic atraumatic.  Anicteric sclera.  Oral mucosa is moist.  Oropharynx is normal.  Neck: No adenopathy or thyromegaly.  Lungs: Rales in the left lung base but otherwise clear to auscultation.  Heart exam: Normal S1-S2 without extra sounds or murmurs.  Regular rate and rhythm.  Abdomen is nontender.  Extremities are atraumatic.  Skin is dry without rashes or lesions.  Neuroexam: Awake, conversive and moving all 4 extremities well.    ED Course     Labs Reviewed   COMP METABOLIC PANEL (14) - Abnormal; Notable for the following components:       Result Value    Glucose 172 (*)     Sodium 133 (*)     CO2 19.0 (*)     Creatinine 1.17 (*)     eGFR-Cr 43 (*)     Globulin  4.7 (*)     A/G Ratio 0.7 (*)     All other components within normal limits   PRO BETA NATRIURETIC PEPTIDE - Abnormal; Notable for the following components:    Pro-Beta Natriuretic Peptide 5,950 (*)     All other components within normal limits   PTT, ACTIVATED - Abnormal; Notable for the following components:    PTT 43.8 (*)     All other components within normal limits   CBC W/ DIFFERENTIAL - Abnormal; Notable for the following components:    RBC 3.67 (*)     HGB 11.6 (*)     HCT 34.0 (*)     All other components within normal limits   TROPONIN I HIGH SENSITIVITY - Normal   MAGNESIUM - Normal   TSH W REFLEX TO FREE T4 - Normal   PROTHROMBIN TIME (PT) - Normal   CBC WITH DIFFERENTIAL WITH PLATELET    Narrative:     The following orders were created for panel order CBC With Differential With Platelet.  Procedure                               Abnormality         Status                     ---------                               -----------         ------                     CBC W/ DIFFERENTIAL[[id number]]          Abnormal            Final result                 Please view results for these tests on the individual orders.   URINALYSIS, ROUTINE   HEMOGLOBIN A1C   RAINBOW DRAW LAVENDER   RAINBOW DRAW LIGHT GREEN   RAINBOW DRAW BLUE    RAINBOW DRAW GOLD     EKG    Rate, intervals and axes as noted on EKG Report.  Rate: 135   Rhythm: Atrial Fibrillation  Reading: RVR with premature ventricular or aberrantly conducted complexes.  Possible anterior infarct, age undetermined.  Abnormal EKG.  Agree with EKG report.         XR CHEST AP PORTABLE  (CPT=71045)    Result Date: 4/16/2024  PROCEDURE:  XR CHEST AP PORTABLE  (CPT=71045)  TECHNIQUE:  AP chest radiograph was obtained.  COMPARISON:  [facility], XR, XR CHEST AP PORTABLE  (CPT=71045), 12/10/2023, 1:22 PM.  INDICATIONS:  shortness of breath. language barrier  PATIENT STATED HISTORY: (As transcribed by Technologist)  Shortness of breath.    FINDINGS:  Lung volumes are satisfactory.  The pulmonary vessels appear larger and less defined.  There is diffuse interstitial thickening.  This also appears increased, suspicious for superimposed acute interstitial edema with chronic thickening/fibrosis.  No new pleural effusion.  Stable cardiomediastinal contour, with mild to moderate cardiomegaly allowing for portable technique.  No pneumothorax.  Right axillary clips.             CONCLUSION:  Change in the appearance of the pulmonary vasculature and interstitium.  Correlate for fluid status and or signs of congestive heart failure.  Details as above.  Continued clinical correlation and follow-up recommended.   LOCATION:  [facility]      Dictated by (CST): Wan Mora MD on 4/16/2024 at 9:16 PM     Finalized by (CST): Wan Mora MD on 4/16/2024 at 9:20 PM       I personally reviewed the images myself and went over results with patient.    I viewed the chest x-ray films myself.  There is pulmonary cephalization consistent with congestive heart failure.        Intravenous access was obtained.  Laboratory studies were drawn.  Cardizem by bolus and drip was initiated.    Case was discussed with [facility] hospitalist Dr. Garcia and with JAMEEL Remy.  Heparin protocol was initiated.  Lasix was given for evidence of  congestive heart failure.    Treatment plan was discussed with the patient and daughter prior to admission.    A total of 35 minutes of critical care time (exclusive of billable procedures) was administered to manage the patient's cardiovascular instability due to her atrial fibrillation with RVR.  This involved direct patient intervention, complex decision making, and/or extensive discussions with the patient, family, and clinical staff.          MDM      #1.  Shortness of breath secondary to new onset atrial fibrillation with RVR.  Cardizem and heparin initiated.  2.  Congestive heart failure.  Low-dose Lasix administered.  3.  Anemia.  Admission disposition: 4/16/2024  9:15 PM                                        Medical Decision Making      Disposition and Plan     Clinical Impression:  1. Atrial fibrillation with rapid ventricular response (HCC)    2. Anemia, unspecified type    3. Acute on chronic congestive heart failure, unspecified heart failure type (HCC)         Disposition:  Admit  4/16/2024  9:15 pm    Follow-up:  No follow-up provider specified.        Medications Prescribed:  Current Discharge Medication List                            Hospital Problems       Present on Admission             ICD-10-CM Noted POA    * (Principal) Atrial fibrillation with rapid ventricular response (HCC) I48.91 4/16/2024 Unknown

## 2024-04-17 NOTE — PROGRESS NOTES
[facility]   part of [facility]     Hospitalist Progress Note     Giselle Weeks Patient Status:  Inpatient    1929 MRN [id number]   Location [facility] 8NE-A Attending Pamella Mccray MD   Hosp Day # 0 PCP Wicho Ward     Chief Complaint: palpitations    Subjective:     Patient is c/o dysuria    Objective:    Review of Systems:   A comprehensive review of systems was completed; pertinent positive and negatives stated in subjective.    Vital signs:  Temp:  [97.7 °F (36.5 °C)-98.7 °F (37.1 °C)] 98.7 °F (37.1 °C)  Pulse:  [] 81  Resp:  [17-24] 18  BP: (110-161)/(58-97) 110/58  SpO2:  [93 %-99 %] 98 %    Physical Exam:    General: No acute distress  Respiratory: No wheezes, no rhonchi  Cardiovascular: S1, S2, regular rate and rhythm  Abdomen: Soft, Non-tender, non-distended, positive bowel sounds  Neuro: No new focal deficits.   Extremities: No edema      Diagnostic Data:    Labs:  Recent Labs   Lab 24  0455   WBC 5.7 6.3   HGB 11.6* 10.5*   MCV 92.6 91.1   .0 185.0  185.0   INR 1.09  --        Recent Labs   Lab 24  0455   * 109*   BUN 20 18   CREATSERUM 1.17* 1.17*   CA 9.1 8.8   ALB 3.4  --    * 137   K 4.1 3.6    107   CO2 19.0* 22.0   ALKPHO 82  --    AST 21  --    ALT 19  --    BILT 0.3  --    TP 8.1  --        Estimated Creatinine Clearance: 23.3 mL/min (A) (based on SCr of 1.17 mg/dL (H)).    Recent Labs   Lab 24   TROPHS 11       Recent Labs   Lab 24   PTP 14.2   INR 1.09       Lab Results   Component Value Date    TSH 2.940 2024             Microbiology    No results found for this visit on 24.      Imaging: Reviewed in Epic.    Medications:    aspirin  81 mg Oral Daily    atorvastatin  10 mg Oral Nightly    metoprolol tartrate  25 mg Oral 2x Daily(Beta Blocker)    [START ON 2024] furosemide  40 mg Oral Daily       Assessment & Plan:      #Atrial fibrillation with RVR    #Essential hypertension  -Admit  -Cardizem drip  -Heparin drip  -Hold PTA Metoprolol and clarify if on tartrate once a day or succinate.  -TSH, ECHO  -Monitor hemodynamics  -Telemetry  -Cardiology consult     #Acute on chronic diastolic heart failure   -Lasix 20 IV x 1  -Resume Lasix 20 PO daily tomorrow     #Dyslipidemia  -Statin     #Hyperglycemia  -Check A1c      Pamella Mccray MD    Supplementary Documentation:     Quality:  DVT Mechanical Prophylaxis:        DVT Pharmacologic Prophylaxis   Medication    heparin (Porcine) 49047 units/250mL infusion ACS/AFIB CONTINUOUS         DVT Pharmacologic prophylaxis: Aspirin 81 mg      Code Status: Not on file  Quigley: External urinary catheter in place  Quigley Duration (in days):   Central line:    SHELLY: 4/18/2024    Discharge is dependent on: progress  At this point Ms. Weeks is expected to be discharge to: home    The 21st Century Cures Act makes medical notes like these available to patients in the interest of transparency. Please be advised this is a medical document. Medical documents are intended to carry relevant information, facts as evident, and the clinical opinion of the practitioner. The medical note is intended as peer to peer communication and may appear blunt or direct. It is written in medical language and may contain abbreviations or verbiage that are unfamiliar.             **Certification      PHYSICIAN Certification of Need for Inpatient Hospitalization - Initial Certification    Patient will require inpatient services that will reasonably be expected to span two midnight's based on the clinical documentation in H+P.   Based on patients current state of illness, I anticipate that, after discharge, patient will require TBD.

## 2024-04-18 ENCOUNTER — APPOINTMENT (OUTPATIENT)
Dept: CV DIAGNOSTICS | Facility: HOSPITAL | Age: 89
End: 2024-04-18
Attending: PHYSICIAN ASSISTANT
Payer: MEDICARE

## 2024-04-18 LAB
ANION GAP SERPL CALC-SCNC: 6 MMOL/L (ref 0–18)
APTT PPP: 33 SECONDS (ref 23.3–35.6)
APTT PPP: 97.2 SECONDS (ref 23.3–35.6)
BUN BLD-MCNC: 25 MG/DL (ref 9–23)
CALCIUM BLD-MCNC: 9.4 MG/DL (ref 8.5–10.1)
CHLORIDE SERPL-SCNC: 108 MMOL/L (ref 98–112)
CO2 SERPL-SCNC: 22 MMOL/L (ref 21–32)
CREAT BLD-MCNC: 1.44 MG/DL
EGFRCR SERPLBLD CKD-EPI 2021: 34 ML/MIN/1.73M2 (ref 60–?)
GLUCOSE BLD-MCNC: 148 MG/DL (ref 70–99)
OSMOLALITY SERPL CALC.SUM OF ELEC: 289 MOSM/KG (ref 275–295)
PLATELET # BLD AUTO: 175 10(3)UL (ref 150–450)
POTASSIUM SERPL-SCNC: 4.2 MMOL/L (ref 3.5–5.1)
SODIUM SERPL-SCNC: 136 MMOL/L (ref 136–145)

## 2024-04-18 PROCEDURE — 93306 TTE W/DOPPLER COMPLETE: CPT | Performed by: PHYSICIAN ASSISTANT

## 2024-04-18 PROCEDURE — 99232 SBSQ HOSP IP/OBS MODERATE 35: CPT | Performed by: HOSPITALIST

## 2024-04-18 RX ORDER — DILTIAZEM HYDROCHLORIDE 5 MG/ML
10 INJECTION INTRAVENOUS EVERY 6 HOURS PRN
Status: DISCONTINUED | OUTPATIENT
Start: 2024-04-18 | End: 2024-04-21

## 2024-04-18 RX ORDER — ACETAMINOPHEN 500 MG
500 TABLET ORAL EVERY 4 HOURS PRN
Status: DISCONTINUED | OUTPATIENT
Start: 2024-04-18 | End: 2024-04-21

## 2024-04-18 RX ORDER — HEPARIN SODIUM 1000 [USP'U]/ML
30 INJECTION, SOLUTION INTRAVENOUS; SUBCUTANEOUS ONCE
Status: COMPLETED | OUTPATIENT
Start: 2024-04-18 | End: 2024-04-18

## 2024-04-18 NOTE — PLAN OF CARE
Patient alert and oriented x 3. On room air. Afib on cardiac monitor. HR 70 to 100's,Patient denies any chest pain or chest discomfort at this time. Family at bedside ,Patient voids, last BM 4/17, soft formed, assisted to [facility], no acute distress noted  on Heparin gtt and Cardizem drip . Plan of care updated, all questions answered. Safety precautions in place. Bed alarm on. Will continue to monitor tele/labs/vital signs closely.    Problem: Patient/Family Goals  Goal: Patient/Family Long Term Goal  Description: Patient's Long Term Goal: stay out of hospital     Interventions:  - medication compliance  -follow up with primary care physician  - See additional Care Plan goals for specific interventions  Outcome: Progressing  Goal: Patient/Family Short Term Goal  Description: Patient's Short Term Goal: go home    Interventions:   - heparin gtt  - Cardizem gtt  -po Cardizem   - cardiology to see  - See additional Care Plan goals for specific interventions  Outcome: Progressing     Problem: CARDIOVASCULAR - ADULT  Goal: Maintains optimal cardiac output and hemodynamic stability  Description: INTERVENTIONS:  - Monitor vital signs, rhythm, and trends  - Monitor for bleeding, hypotension and signs of decreased cardiac output  - Evaluate effectiveness of vasoactive medications to optimize hemodynamic stability  - Monitor arterial and/or venous puncture sites for bleeding and/or hematoma  - Assess quality of pulses, skin color and temperature  - Assess for signs of decreased coronary artery perfusion - ex. Angina  - Evaluate fluid balance, assess for edema, trend weights  Outcome: Progressing  Goal: Absence of cardiac arrhythmias or at baseline  Description: INTERVENTIONS:  - Continuous cardiac monitoring, monitor vital signs, obtain 12 lead EKG if indicated  - Evaluate effectiveness of antiarrhythmic and heart rate control medications as ordered  - Initiate emergency measures for life threatening arrhythmias  - Monitor  electrolytes and administer replacement therapy as ordered  Outcome: Progressing     Problem: RESPIRATORY - ADULT  Goal: Achieves optimal ventilation and oxygenation  Description: INTERVENTIONS:  - Assess for changes in respiratory status  - Assess for changes in mentation and behavior  - Position to facilitate oxygenation and minimize respiratory effort  - Oxygen supplementation based on oxygen saturation or ABGs  - Provide Smoking Cessation handout, if applicable  - Encourage broncho-pulmonary hygiene including cough, deep breathe, Incentive Spirometry  - Assess the need for suctioning and perform as needed  - Assess and instruct to report SOB or any respiratory difficulty  - Respiratory Therapy support as indicated  - Manage/alleviate anxiety  - Monitor for signs/symptoms of CO2 retention  Outcome: Progressing     Problem: PAIN - ADULT  Goal: Verbalizes/displays adequate comfort level or patient's stated pain goal  Description: INTERVENTIONS:  - Encourage pt to monitor pain and request assistance  - Assess pain using appropriate pain scale  - Administer analgesics based on type and severity of pain and evaluate response  - Implement non-pharmacological measures as appropriate and evaluate response  - Consider cultural and social influences on pain and pain management  - Manage/alleviate anxiety  - Utilize distraction and/or relaxation techniques  - Monitor for opioid side effects  - Notify MD/LIP if interventions unsuccessful or patient reports new pain  - Anticipate increased pain with activity and pre-medicate as appropriate  Outcome: Progressing     Problem: RISK FOR INFECTION - ADULT  Goal: Absence of fever/infection during anticipated neutropenic period  Description: INTERVENTIONS  - Monitor WBC  - Administer growth factors as ordered  - Implement neutropenic guidelines  Outcome: Progressing     Problem: SAFETY ADULT - FALL  Goal: Free from fall injury  Description: INTERVENTIONS:  - Assess pt frequently for  physical needs  - Identify cognitive and physical deficits and behaviors that affect risk of falls.  - [place] fall precautions as indicated by assessment.  - Educate pt/family on patient safety including physical limitations  - Instruct pt to call for assistance with activity based on assessment  - Modify environment to reduce risk of injury  - Provide assistive devices as appropriate  - Consider OT/PT consult to assist with strengthening/mobility  - Encourage toileting schedule  Outcome: Progressing     Problem: DISCHARGE PLANNING  Goal: Discharge to home or other facility with appropriate resources  Description: INTERVENTIONS:  - Identify barriers to discharge w/pt and caregiver  - Include patient/family/discharge partner in discharge planning  - Arrange for needed discharge resources and transportation as appropriate  - Identify discharge learning needs (meds, wound care, etc)  - Arrange for interpreters to assist at discharge as needed  - Consider post-discharge preferences of patient/family/discharge partner  - Complete POLST form as appropriate  - Assess patient's ability to be responsible for managing their own health  - Refer to Case Management Department for coordinating discharge planning if the patient needs post-hospital services based on physician/LIP order or complex needs related to functional status, cognitive ability or social support system  Outcome: Progressing     Problem: Altered Communication/Language Barrier  Goal: Patient/Family is able to understand and participate in their care  Description: Interventions:  - Assess communication ability and preferred communication style  - Implement communication aides and strategies  - Use visual cues when possible  - Listen attentively, be patient, do not interrupt  - Minimize distractions  - Allow time for understanding and response  - Establish method for patient to ask for assistance (call light)  - Provide an  as needed  - Communicate  barriers and strategies to overcome with those who interact with patient  Outcome: Progressing     Problem: Patient/Family Goals  Goal: Patient/Family Long Term Goal  Description: Patient's Long Term Goal: stay out of hospital     Interventions:  - medication compliance  -follow up with primary care physician  - See additional Care Plan goals for specific interventions  Outcome: Progressing  Goal: Patient/Family Short Term Goal  Description: Patient's Short Term Goal: go home    Interventions:   - heparin gtt  - Cardizem gtt  -po Cardizem   - cardiology to see  - See additional Care Plan goals for specific interventions  Outcome: Progressing     Problem: CARDIOVASCULAR - ADULT  Goal: Maintains optimal cardiac output and hemodynamic stability  Description: INTERVENTIONS:  - Monitor vital signs, rhythm, and trends  - Monitor for bleeding, hypotension and signs of decreased cardiac output  - Evaluate effectiveness of vasoactive medications to optimize hemodynamic stability  - Monitor arterial and/or venous puncture sites for bleeding and/or hematoma  - Assess quality of pulses, skin color and temperature  - Assess for signs of decreased coronary artery perfusion - ex. Angina  - Evaluate fluid balance, assess for edema, trend weights  Outcome: Progressing  Goal: Absence of cardiac arrhythmias or at baseline  Description: INTERVENTIONS:  - Continuous cardiac monitoring, monitor vital signs, obtain 12 lead EKG if indicated  - Evaluate effectiveness of antiarrhythmic and heart rate control medications as ordered  - Initiate emergency measures for life threatening arrhythmias  - Monitor electrolytes and administer replacement therapy as ordered  Outcome: Progressing     Problem: RESPIRATORY - ADULT  Goal: Achieves optimal ventilation and oxygenation  Description: INTERVENTIONS:  - Assess for changes in respiratory status  - Assess for changes in mentation and behavior  - Position to facilitate oxygenation and minimize  respiratory effort  - Oxygen supplementation based on oxygen saturation or ABGs  - Provide Smoking Cessation handout, if applicable  - Encourage broncho-pulmonary hygiene including cough, deep breathe, Incentive Spirometry  - Assess the need for suctioning and perform as needed  - Assess and instruct to report SOB or any respiratory difficulty  - Respiratory Therapy support as indicated  - Manage/alleviate anxiety  - Monitor for signs/symptoms of CO2 retention  Outcome: Progressing     Problem: PAIN - ADULT  Goal: Verbalizes/displays adequate comfort level or patient's stated pain goal  Description: INTERVENTIONS:  - Encourage pt to monitor pain and request assistance  - Assess pain using appropriate pain scale  - Administer analgesics based on type and severity of pain and evaluate response  - Implement non-pharmacological measures as appropriate and evaluate response  - Consider cultural and social influences on pain and pain management  - Manage/alleviate anxiety  - Utilize distraction and/or relaxation techniques  - Monitor for opioid side effects  - Notify MD/LIP if interventions unsuccessful or patient reports new pain  - Anticipate increased pain with activity and pre-medicate as appropriate  Outcome: Progressing     Problem: RISK FOR INFECTION - ADULT  Goal: Absence of fever/infection during anticipated neutropenic period  Description: INTERVENTIONS  - Monitor WBC  - Administer growth factors as ordered  - Implement neutropenic guidelines  Outcome: Progressing     Problem: SAFETY ADULT - FALL  Goal: Free from fall injury  Description: INTERVENTIONS:  - Assess pt frequently for physical needs  - Identify cognitive and physical deficits and behaviors that affect risk of falls.  - [place] fall precautions as indicated by assessment.  - Educate pt/family on patient safety including physical limitations  - Instruct pt to call for assistance with activity based on assessment  - Modify environment to reduce risk  of injury  - Provide assistive devices as appropriate  - Consider OT/PT consult to assist with strengthening/mobility  - Encourage toileting schedule  Outcome: Progressing     Problem: DISCHARGE PLANNING  Goal: Discharge to home or other facility with appropriate resources  Description: INTERVENTIONS:  - Identify barriers to discharge w/pt and caregiver  - Include patient/family/discharge partner in discharge planning  - Arrange for needed discharge resources and transportation as appropriate  - Identify discharge learning needs (meds, wound care, etc)  - Arrange for interpreters to assist at discharge as needed  - Consider post-discharge preferences of patient/family/discharge partner  - Complete POLST form as appropriate  - Assess patient's ability to be responsible for managing their own health  - Refer to Case Management Department for coordinating discharge planning if the patient needs post-hospital services based on physician/LIP order or complex needs related to functional status, cognitive ability or social support system  Outcome: Progressing     Problem: Altered Communication/Language Barrier  Goal: Patient/Family is able to understand and participate in their care  Description: Interventions:  - Assess communication ability and preferred communication style  - Implement communication aides and strategies  - Use visual cues when possible  - Listen attentively, be patient, do not interrupt  - Minimize distractions  - Allow time for understanding and response  - Establish method for patient to ask for assistance (call light)  - Provide an  as needed  - Communicate barriers and strategies to overcome with those who interact with patient  Outcome: Progressing

## 2024-04-18 NOTE — PROGRESS NOTES
Progress Note  Giselle Weeks Patient Status:  Inpatient    1929 MRN [id number]   Location [facility] 8NE-A Attending Pamella Mccray MD   Hosp Day # 1 PCP Wicho Ward     Subjective:  Pt denies chest pain, SOB, palpitations. Laying comfortably in bed, dauughter at bedside helping to translate.     Objective:  /72 (BP Location: Left arm)   Pulse 96   Temp 98.2 °F (36.8 °C) (Oral)   Resp 18   Ht 5' 2\" (1.575 m)   Wt 146 lb 2.6 oz (66.3 kg)   SpO2 98%   BMI 26.73 kg/m²     Telemetry: Afib 80's-90's    Intake/Output:    Intake/Output Summary (Last 24 hours) at 2024 1406  Last data filed at 2024 1230  Gross per 24 hour   Intake 479.74 ml   Output 525 ml   Net -45.26 ml       Last 3 Weights   24 0407 146 lb 2.6 oz (66.3 kg)   24 0145 143 lb 11.8 oz (65.2 kg)   24 156 lb (70.8 kg)   12/10/23 1245 151 lb (68.5 kg)       Labs:  Recent Labs   Lab 24  0455 24  1532   * 109*  --    BUN 20 18  --    CREATSERUM 1.17* 1.17*  --    EGFRCR 43* 43*  --    CA 9.1 8.8  --    * 137  --    K 4.1 3.6 5.1    107  --    CO2 19.0* 22.0  --      Recent Labs   Lab 24  0455 24  0242   RBC 3.67* 3.27*  --    HGB 11.6* 10.5*  --    HCT 34.0* 29.8*  --    MCV 92.6 91.1  --    MCH 31.6 32.1  --    MCHC 34.1 35.2  --    RDW 13.3 13.2  --    NEPRELIM 2.76  --   --    WBC 5.7 6.3  --    .0 185.0  185.0 175.0         Recent Labs   Lab 247   TROPHS 11       Diagnostics:  No results found.   Review of Systems   Cardiovascular:  Negative for chest pain, dyspnea on exertion, leg swelling, orthopnea and palpitations.   Respiratory: Negative.         Physical Exam:    Gen: alert, oriented x 3, NAD  Heent: pupils equal, reactive. Mucous membranes moist.   Neck: no jvd  Cardiac: irregular rate and rhythm, normal S1,S2, +2/6 apical MARJORIE murmur, no clicks, rub or gallop  Lungs: CTA  Abd: soft, NT/ND +bs  Ext:  no edema  Skin: Warm, dry  Neuro: No focal deficits      Medications:     aspirin  81 mg Oral Daily    atorvastatin  10 mg Oral Nightly    furosemide  40 mg Oral Daily    metoprolol tartrate  50 mg Oral 2x Daily(Beta Blocker)      dilTIAZem 5 mg/hr (04/17/24 2116)    continuous dose heparin 500 Units/hr (04/18/24 1103)       Assessment:  New Onset Atrial Fibrillation w/RVR  On IV diltiazem 2.5mg/hr - HR 80s-100s  On metoprolol 50mg po BID  [id number] 4: On IV heparin  Acute on Chronic HFpEF  proBNP 5950 on admit, CXR w/pulm edema  LVEF 60-65%, mod MR, mod TR, PASP 45-50  On po furosemide 40mg daily   I&O incomplete d/t incontinence, wt appears inaccurate  Hypertension - controlled  On toprol  Hyperlipidemia - statin       Plan:  Check BMP today  Wean off diltiazem gtt today - HR fairly well controlled   Continue metoprolol 50mg po BID  Discontinue heparin gtt - transition to Eliquis 2.5mg po BID (reduced dosing d/t age, renal function) today   Continue statin   Continue furosemide 40mg po daily  Hopefully home in next 1-2 days    Plan of care discussed with patient, RN.    Fay Garrido, GALDINO  4/18/2024  2:06 PM  [phone] [facility]  [phone] [facility]        Patient seen and examined independently.  Note reviewed and labs reviewed.  Agree with above assessment and plan.  Rate control stragegy for afib - now off IV cardizem.  Uptitrate oral BB as needed for goal resting HR <100 bpm.  Low dose eliquis 2.5 mg bid for stroke prevention.    CINDY Rincon MD

## 2024-04-18 NOTE — PROGRESS NOTES
[facility]   part of [facility]     Hospitalist Progress Note     Giselle Weeks Patient Status:  Inpatient    1929 MRN [id number]   [facility] 8NE-A Attending Pamella Mccray MD   Hosp Day # 1 PCP Wicho Ward     Chief Complaint: palpitations    Subjective:     Patient is c/o feeling weak and tired    Objective:    Review of Systems:   A comprehensive review of systems was completed; pertinent positive and negatives stated in subjective.    Vital signs:  Temp:  [97.4 °F (36.3 °C)-98.5 °F (36.9 °C)] 97.8 °F (36.6 °C)  Pulse:  [75-96] 85  Resp:  [17-22] 17  BP: ()/(52-72) 107/58  SpO2:  [97 %-99 %] 98 %    Physical Exam:    General: No acute distress  Respiratory: No wheezes, no rhonchi  Cardiovascular: S1, S2, regular rate and rhythm  Abdomen: Soft, Non-tender, non-distended, positive bowel sounds  Neuro: No new focal deficits.   Extremities: No edema      Diagnostic Data:    Labs:  Recent Labs   Lab 24  0455 24  0242   WBC 5.7 6.3  --    HGB 11.6* 10.5*  --    MCV 92.6 91.1  --    .0 185.0  185.0 175.0   INR 1.09  --   --        Recent Labs   Lab 245 24  1532 24  1446   * 109*  --  148*   BUN 20 18  --  25*   CREATSERUM 1.17* 1.17*  --  1.44*   CA 9.1 8.8  --  9.4   ALB 3.4  --   --   --    * 137  --  136   K 4.1 3.6 5.1 4.2    107  --  108   CO2 19.0* 22.0  --  22.0   ALKPHO 82  --   --   --    AST 21  --   --   --    ALT 19  --   --   --    BILT 0.3  --   --   --    TP 8.1  --   --   --        Estimated Creatinine Clearance: 18.9 mL/min (A) (based on SCr of 1.44 mg/dL (H)).    Recent Labs   Lab 24   TROPHS 11       Recent Labs   Lab 24   PTP 14.2   INR 1.09                    Microbiology    No results found for this visit on 24.      Imaging: Reviewed in Epic.    Medications:    apixaban  2.5 mg Oral BID    aspirin  81 mg Oral Daily    atorvastatin  10  mg Oral Nightly    furosemide  40 mg Oral Daily    metoprolol tartrate  50 mg Oral 2x Daily(Beta Blocker)       Assessment & Plan:      #Atrial fibrillation with RVR   #Essential hypertension    -Cardizem drip  -Heparin drip  -metoprolol  -TSH, ECHO  -Monitor hemodynamics  -Telemetry  -Cardiology consult     #Acute on chronic diastolic heart failure   -Lasix 20 IV  -Resume Lasix 20 PO     #Dyslipidemia  -Statin     #Hyperglycemia        Pamella Mccray MD    Supplementary Documentation:     Quality:  DVT Mechanical Prophylaxis:        DVT Pharmacologic Prophylaxis   Medication    apixaban (Eliquis) tab 2.5 mg         DVT Pharmacologic prophylaxis: Aspirin 81 mg      Code Status: Not on file  Quigley: External urinary catheter in place  Qu[place] Duration (in days):   Central line:    SHELLY: 4/19/2024    Discharge is dependent on: progress  At this point Ms. Weeks is expected to be discharge to: home    The 21st Century Cures Act makes medical notes like these available to patients in the interest of transparency. Please be advised this is a medical document. Medical documents are intended to carry relevant information, facts as evident, and the clinical opinion of the practitioner. The medical note is intended as peer to peer communication and may appear blunt or direct. It is written in medical language and may contain abbreviations or verbiage that are unfamiliar.             **Certification      PHYSICIAN Certification of Need for Inpatient Hospitalization - Initial Certification    Patient will require inpatient services that will reasonably be expected to span two midnight's based on the clinical documentation in H+P.   Based on patients current state of illness, I anticipate that, after discharge, patient will require TBD.

## 2024-04-18 NOTE — PLAN OF CARE
Pt alert and oriented x3. Pt on tele in A fib, asymptomatic. VSS. Denies any c/o of chest pain or shortness of breath. Pt on room air. Pt continent of bowel and bladder. Pt endorsed pain relieved by PRN pain medication. Family at bedside actively participating in pt care. Updated pt on plan of care, pt verbalizes understanding. Bed in lowest position and call light within reach.     Plan: HR control    Problem: Patient/Family Goals  Goal: Patient/Family Long Term Goal  Description: Patient's Long Term Goal: stay out of hospital     Interventions:  - medication compliance  -follow up with primary care physician  - See additional Care Plan goals for specific interventions  Outcome: Progressing  Goal: Patient/Family Short Term Goal  Description: Patient's Short Term Goal: go home    Interventions:   - heparin gtt  - Cardizem gtt  -po Cardizem   - cardiology to see  - See additional Care Plan goals for specific interventions  Outcome: Progressing     Problem: CARDIOVASCULAR - ADULT  Goal: Maintains optimal cardiac output and hemodynamic stability  Description: INTERVENTIONS:  - Monitor vital signs, rhythm, and trends  - Monitor for bleeding, hypotension and signs of decreased cardiac output  - Evaluate effectiveness of vasoactive medications to optimize hemodynamic stability  - Monitor arterial and/or venous puncture sites for bleeding and/or hematoma  - Assess quality of pulses, skin color and temperature  - Assess for signs of decreased coronary artery perfusion - ex. Angina  - Evaluate fluid balance, assess for edema, trend weights  Outcome: Progressing  Goal: Absence of cardiac arrhythmias or at baseline  Description: INTERVENTIONS:  - Continuous cardiac monitoring, monitor vital signs, obtain 12 lead EKG if indicated  - Evaluate effectiveness of antiarrhythmic and heart rate control medications as ordered  - Initiate emergency measures for life threatening arrhythmias  - Monitor electrolytes and administer  replacement therapy as ordered  Outcome: Progressing     Problem: RESPIRATORY - ADULT  Goal: Achieves optimal ventilation and oxygenation  Description: INTERVENTIONS:  - Assess for changes in respiratory status  - Assess for changes in mentation and behavior  - Position to facilitate oxygenation and minimize respiratory effort  - Oxygen supplementation based on oxygen saturation or ABGs  - Provide Smoking Cessation handout, if applicable  - Encourage broncho-pulmonary hygiene including cough, deep breathe, Incentive Spirometry  - Assess the need for suctioning and perform as needed  - Assess and instruct to report SOB or any respiratory difficulty  - Respiratory Therapy support as indicated  - Manage/alleviate anxiety  - Monitor for signs/symptoms of CO2 retention  Outcome: Progressing     Problem: PAIN - ADULT  Goal: Verbalizes/displays adequate comfort level or patient's stated pain goal  Description: INTERVENTIONS:  - Encourage pt to monitor pain and request assistance  - Assess pain using appropriate pain scale  - Administer analgesics based on type and severity of pain and evaluate response  - Implement non-pharmacological measures as appropriate and evaluate response  - Consider cultural and social influences on pain and pain management  - Manage/alleviate anxiety  - Utilize distraction and/or relaxation techniques  - Monitor for opioid side effects  - Notify MD/LIP if interventions unsuccessful or patient reports new pain  - Anticipate increased pain with activity and pre-medicate as appropriate  Outcome: Progressing     Problem: RISK FOR INFECTION - ADULT  Goal: Absence of fever/infection during anticipated neutropenic period  Description: INTERVENTIONS  - Monitor WBC  - Administer growth factors as ordered  - Implement neutropenic guidelines  Outcome: Progressing     Problem: SAFETY ADULT - FALL  Goal: Free from fall injury  Description: INTERVENTIONS:  - Assess pt frequently for physical needs  - Identify  cognitive and physical deficits and behaviors that affect risk of falls.  - [place] fall precautions as indicated by assessment.  - Educate pt/family on patient safety including physical limitations  - Instruct pt to call for assistance with activity based on assessment  - Modify environment to reduce risk of injury  - Provide assistive devices as appropriate  - Consider OT/PT consult to assist with strengthening/mobility  - Encourage toileting schedule  Outcome: Progressing     Problem: DISCHARGE PLANNING  Goal: Discharge to home or other facility with appropriate resources  Description: INTERVENTIONS:  - Identify barriers to discharge w/pt and caregiver  - Include patient/family/discharge partner in discharge planning  - Arrange for needed discharge resources and transportation as appropriate  - Identify discharge learning needs (meds, wound care, etc)  - Arrange for interpreters to assist at discharge as needed  - Consider post-discharge preferences of patient/family/discharge partner  - Complete POLST form as appropriate  - Assess patient's ability to be responsible for managing their own health  - Refer to Case Management Department for coordinating discharge planning if the patient needs post-hospital services based on physician/LIP order or complex needs related to functional status, cognitive ability or social support system  Outcome: Progressing     Problem: Altered Communication/Language Barrier  Goal: Patient/Family is able to understand and participate in their care  Description: Interventions:  - Assess communication ability and preferred communication style  - Implement communication aides and strategies  - Use visual cues when possible  - Listen attentively, be patient, do not interrupt  - Minimize distractions  - Allow time for understanding and response  - Establish method for patient to ask for assistance (call light)  - Provide an  as needed  - Communicate barriers and strategies to  overcome with those who interact with patient  Outcome: Progressing

## 2024-04-18 NOTE — PHYSICAL THERAPY NOTE
PHYSICAL THERAPY EVALUATION - INPATIENT     Room Number: 8622/8622-A  Evaluation Date: 4/18/2024  Type of Evaluation: Initial  Physician Order: PT Eval and Treat    Presenting Problem: afib with RVR, acute on chronic diastolic heart failure  Co-Morbidities : HTN, CHF  Reason for Therapy: Mobility Dysfunction and Discharge Planning    PHYSICAL THERAPY ASSESSMENT   Patient is currently functioning near baseline with bed mobility, transfers, and gait.  Prior to admission, patient's baseline is mod ind with rollator.  Patient is requiring minimal assist as a result of the following impairments: decreased functional strength, decreased endurance/aerobic capacity, impaired   balance, and decreased muscular endurance.  Physical Therapy will continue to follow for duration of hospitalization.    Patient will benefit from continued skilled PT Services at discharge to promote functional independence and safety with additional support and return home with home health PT.    PLAN  PT Treatment Plan: Bed mobility;Endurance;Energy conservation;Patient education;Family education;Gait training;Strengthening;Transfer training;Balance training  Rehab Potential : Good  Frequency (Obs): 3-5x/week  Number of Visits to Meet Established Goals: 4      CURRENT GOALS    Goal #1 Patient is able to demonstrate supine - sit EOB @ level: supervision     Goal #2 Patient is able to demonstrate transfers Sit to/from Stand at assistance level: supervision     Goal #3 Patient is able to ambulate 100 feet with assist device: walker - rolling at assistance level: supervision     Goal #4    Goal #5    Goal #6    Goal Comments: Goals established on 4/18/2024      PHYSICAL THERAPY MEDICAL/SOCIAL HISTORY  History related to current admission: Patient is a 89 year old female admitted on 4/16/2024 from home for shortness of breath.  Pt diagnosed with afib with RVR and acute on chronic diastolic heart failure.      HOME SITUATION  Type of Home: House    Home Layout: One level                Lives With:  (DIL)  Drives: No  Patient Owned Equipment: Rollator       Prior Level of [place]: Pt is typically mod ind with ADLs and ambulates with a rollator mod ind. Pt's DIL is able to assist if needed.     SUBJECTIVE  Pt pleasant and cooperative     Family member interpreting for session      OBJECTIVE  Precautions: Bed/chair alarm; needed  Fall Risk: High fall risk    WEIGHT BEARING RESTRICTION  Weight Bearing Restriction: None                PAIN ASSESSMENT  Rating: Unable to rate  Location: LUE  Management Techniques: Activity promotion;Relaxation;Repositioning    COGNITION  Not formally assessed but appears WFL    RANGE OF MOTION AND STRENGTH ASSESSMENT  Upper extremity ROM and strength are within functional limits     Lower extremity ROM is within functional limits     Lower extremity strength is within functional limits: except mild deconditioning      BALANCE  Static Sitting: Good  Dynamic Sitting: Fair -  Static Standing: Poor +  Dynamic Standing: Poor +    ADDITIONAL TESTS                                    ACTIVITY TOLERANCE   Vitals stable                       O2 WALK   On RA. Pt appeared mildly short of breath after ambulating    NEUROLOGICAL FINDINGS                        AM-PAC '6-Clicks' INPATIENT SHORT FORM - BASIC MOBILITY  How much difficulty does the patient currently have...  Patient Difficulty: Turning over in bed (including adjusting bedclothes, sheets and blankets)?: A Little   Patient Difficulty: Sitting down on and standing up from a chair with arms (e.g., wheelchair, bedside commode, etc.): A Little   Patient Difficulty: Moving from lying on back to sitting on the side of the bed?: A Little   How much help from another person does the patient currently need...   Help from Another: Moving to and from a bed to a chair (including a wheelchair)?: A Little   Help from Another: Need to walk in hospital room?: A Little   Help from  Another: Climbing 3-5 steps with a railing?: A Lot       AM-PAC Score:  Raw Score: 17   Approx Degree of Impairment: 50.57%   Standardized Score (AM-PAC Scale): 42.13   CMS Modifier (G-Code): CK    FUNCTIONAL ABILITY STATUS  Gait Assessment   Functional Mobility/Gait Assessment  Gait Assistance: Minimum assistance;Contact guard assist  Distance (ft): 50  Assistive Device: Rolling walker  Pattern: Shuffle (kyphotic posture, RW far ahead of pt)    Skilled Therapy Provided     Bed Mobility:  Rolling: NT  Supine to sit: min A    Sit to supine: NT     Transfer Mobility:  Sit to stand: min A   Stand to sit: min A  Gait = pt ambulated with RW and min A that progressed towards CGA    Therapist's Comments: Pt educated on role of therapy, goals for session, safety, fall prevention, and activity recommendations.     Exercise/Education Provided:  Bed mobility  Energy conservation  Functional activity tolerated  Gait training  Posture  Strengthening  Transfer training    Patient End of Session: Up in chair;Needs met;Call light within reach;RN aware of session/findings;All patient questions and concerns addressed;Alarm set;Family present;Discussed recommendations with /      Patient Evaluation Complexity Level:  History Moderate - 1 or 2 personal factors and/or co-morbidities   Examination of body systems Low - addressing 1-2 elements   Clinical Presentation Low - Stable   Clinical Decision Making Low - Stable       PT Session Time: 20 minutes

## 2024-04-18 NOTE — OCCUPATIONAL THERAPY NOTE
OCCUPATIONAL THERAPY EVALUATION - INPATIENT     Room Number: 8622/8622-A  Evaluation Date: 4/18/2024  Type of Evaluation: Initial  Presenting Problem: Acute on chronic diastolic; heart failure  Afib with RVR    Physician Order: IP Consult to Occupational Therapy  Reason for Therapy: ADL/IADL Dysfunction and Discharge Planning    OCCUPATIONAL THERAPY ASSESSMENT   Patient is currently functioning below baseline with toileting, bathing, lower body dressing, bed mobility, transfers, functional standing tolerance, and energy conservation strategies. Prior to admission, patient's baseline is MOD I with RW.  Patient is requiring minimal assist as a result of the following impairments: decreased functional strength, decreased functional reach, decreased endurance, pain, and impaired standing balance. Occupational Therapy will continue to follow for duration of hospitalization.    Patient will benefit from continued skilled OT Services at discharge to promote functional independence and safety with additional support and return home with home health OT      History Related to Current Admission: Patient is a 89 year old female admitted on 4/16/2024 from home with SOB. Pt diagnosed with acute on chronic diastolic heart failure and Afib with RVR.     Co-Morbidities : HTN, dyslipidemia, CHF    WEIGHT BEARING RESTRICTION  Weight Bearing Restriction: None                Recommendations for nursing staff:   Transfers: MIN A with RW  Toileting location: toilet     EVALUATION SESSION:  Patient Start of Session: semi-supine   FUNCTIONAL TRANSFER ASSESSMENT  Sit to Stand: Edge of Bed  Edge of Bed: Minimal Assist    BED MOBILITY  Supine to Sit : Minimal Assist  Scooting: CGA    BALANCE ASSESSMENT  Static Sitting: Stand-by Assist  Static Standing: Contact Guard Assist    FUNCTIONAL ADL ASSESSMENT  LB Dressing Seated: Minimal Assist      ACTIVITY TOLERANCE: Pt on room air and denies SOB, dizziness or lightheadedness throughout session. No  significant change in vitals noted. Primary c/o is LUE pain.                          O2 SATURATIONS       COGNITION  Arousal/Alertness:  appropriate responses to stimuli  Following Commands:  follows all commands and directions without difficulty  Safety Judgement:  good awareness of safety precautions    Upper Extremity   ROM: within functional limits   Strength: within functional limits - LUE not formally tested d/t pain   Coordination  Gross motor: wfl  Fine motor: wfl    EDUCATION PROVIDED  Patient: Role of Occupational Therapy; Plan of Care; Functional Transfer Techniques; Fall Prevention; Posture/Positioning; Energy Conservation; Compensatory ADL Techniques; Proper Body Mechanics  Patient's Response to Education: Verbalized Understanding; Requires Further Education  Family/Caregiver: Role of Occupational Therapy; Plan of Care; Energy Conservation; Compensatory ADL Techniques; Proper Body Mechanics; Discharge Recommendations  Family/Caregiver's Response to Education: Verbalized Understanding    Equipment used: RW  Demonstrates functional use, Would benefit from additional trial      Therapist comments: Supportive dtr present and included in education. Encouraged Pt to ambulate at least 2 more times today with nursing staff. RN notified of LUE pain and request for medication.     Patient End of Session: Up in chair;Needs met;Call light within reach;RN aware of session/findings;All patient questions and concerns addressed;Alarm set;Family present    OCCUPATIONAL PROFILE    HOME SITUATION  Type of Home: House  Home Layout: One level  Lives With:  (DIL)          Other Equipment: First-alert system (Rollator, cane)          Drives: No       Prior Level of Function: Pt lives with her DIL in one level home. Dtr reports Pt is typically MOD I for ADLs and mobility with RW. However, DIL is available to assist as needed at DC.     SUBJECTIVE   DIL assists with translation per pt/family preference     PAIN  ASSESSMENT  Rating: Unable to rate  Location: LUE  Management Techniques: Breathing techniques;Body mechanics;Relaxation;Nurse notified;Repositioning    OBJECTIVE  Precautions: Bed/chair alarm  Fall Risk: High fall risk      ASSESSMENTS    AM-PAC ‘6-Clicks’ Inpatient Daily Activity Short Form  -   Putting on and taking off regular lower body clothing?: A Lot  -   Bathing (including washing, rinsing, drying)?: A Little  -   Toileting, which includes using toilet, bedpan or urinal? : A Little  -   Putting on and taking off regular upper body clothing?: A Little  -   Taking care of personal grooming such as brushing teeth?: None  -   Eating meals?: None    AM-PAC Score:  Score: 19  Approx Degree of Impairment: 42.8%  Standardized Score (AM-PAC Scale): 40.22    ADDITIONAL TESTS     NEUROLOGICAL FINDINGS      COGNITION ASSESSMENTS       PLAN  OT Treatment Plan: Energy conservation/work simplification techniques;Balance activities;ADL training;Functional transfer training;UE strengthening/ROM;Endurance training;Patient/Family education;Patient/Family training;Equipment eval/education;Compensatory technique education  Rehab Potential : Good  Frequency: 3-5x/week  Number of Visits to Meet Established Goals: 3    ADL Goals   Patient will perform grooming: with setup  Patient will perform upper body dressing:  with setup  Patient will perform lower body dressing:  with supervision  Patient will perform toileting: with supervision    Functional Transfer Goals  Patient will transfer from supine to sit:  with stand by assist  Patient will transfer form stand to sit:  with stand by assist  Patient will transfer to toilet:  with supervision    UE Exercise Program Goal  Patient will be supervision with bilateral AROM HEP (home exercise program).    Patient Evaluation Complexity Level:   Occupational Profile/Medical History LOW - Brief history including review of medical or therapy records    Specific performance deficits impacting  engagement in ADL/IADL MODERATE  3 - 5 performance deficits   Client Assessment/Performance Deficits MODERATE - Comorbidities and min to mod modifications of tasks    Clinical Decision Making LOW - Analysis of occupational profile, problem-focused assessments, limited treatment options    Overall Complexity LOW     OT Session Time: 20 minutes

## 2024-04-19 LAB
ANION GAP SERPL CALC-SCNC: 8 MMOL/L (ref 0–18)
BUN BLD-MCNC: 27 MG/DL (ref 9–23)
CALCIUM BLD-MCNC: 8.7 MG/DL (ref 8.5–10.1)
CHLORIDE SERPL-SCNC: 107 MMOL/L (ref 98–112)
CO2 SERPL-SCNC: 22 MMOL/L (ref 21–32)
CREAT BLD-MCNC: 1.84 MG/DL
EGFRCR SERPLBLD CKD-EPI 2021: 25 ML/MIN/1.73M2 (ref 60–?)
GLUCOSE BLD-MCNC: 109 MG/DL (ref 70–99)
OSMOLALITY SERPL CALC.SUM OF ELEC: 290 MOSM/KG (ref 275–295)
PLATELET # BLD AUTO: 157 10(3)UL (ref 150–450)
POTASSIUM SERPL-SCNC: 4.5 MMOL/L (ref 3.5–5.1)
SODIUM SERPL-SCNC: 137 MMOL/L (ref 136–145)

## 2024-04-19 PROCEDURE — 99232 SBSQ HOSP IP/OBS MODERATE 35: CPT | Performed by: HOSPITALIST

## 2024-04-19 NOTE — CM/SW NOTE
04/19/24 1600   Discharge disposition   Expected discharge disposition Home-Health   Post Acute Care Provider   ([facility])

## 2024-04-19 NOTE — CM/SW NOTE
Department  notified of request for HHC, aidin referrals started. Assigned CM/SW to follow up with pt/family on further discharge planning.     Nancy Stover, DSC

## 2024-04-19 NOTE — CM/SW NOTE
04/19/24 1200   Choice of Post-Acute Provider   Informed patient of right to choose their preferred provider Yes   List of appropriate post-acute services provided to patient/family with quality data Yes   Patient/family choice [facility] [phone]   Information given to Patient     SW met with pt and daughter-in-law.  [facility] list reviewed. Pt has home physician Dr Ward.  Family spoke to Dr Ward and she will be referring pt to [facility].  SW re-opened referral and sent to [facility]    SW discussed plans for Eliquis. [facility] to [facility] Eliquis to room and use 30 day free coupon.  Family aware of need to f/up with cardiology office for samples, while medicaid is pending.  Explained to family that once Medicaid has been approved- they may require prior auth for Eliquis which will need to be done by cardiology office.    Discussed [facility] and [facility] $ 4 list for meds also.    Melanie Barr LCSW  /Discharge Planner

## 2024-04-19 NOTE — PLAN OF CARE
Assumed care of patient around 0730.Patient AXOX4.On room air.Afib on tele,rate controlled,on PO metoprolol and PO diuretics.Incontinent of bladder ,peurvick intact and draining.Continent of Bowel,positive bowel sounds.Plan of care updated.Family at bedside.Plan of care updated.Fall precautions maintained.  Problem: Patient/Family Goals  Goal: Patient/Family Long Term Goal  Description: Patient's Long Term Goal: stay out of hospital     Interventions:  - medication compliance  -follow up with primary care physician  - See additional Care Plan goals for specific interventions  Outcome: Progressing  Goal: Patient/Family Short Term Goal  Description: Patient's Short Term Goal: go home    Interventions:   - heparin gtt  - Cardizem gtt  -po Cardizem   - cardiology to see  - See additional Care Plan goals for specific interventions  Outcome: Progressing     Problem: CARDIOVASCULAR - ADULT  Goal: Maintains optimal cardiac output and hemodynamic stability  Description: INTERVENTIONS:  - Monitor vital signs, rhythm, and trends  - Monitor for bleeding, hypotension and signs of decreased cardiac output  - Evaluate effectiveness of vasoactive medications to optimize hemodynamic stability  - Monitor arterial and/or venous puncture sites for bleeding and/or hematoma  - Assess quality of pulses, skin color and temperature  - Assess for signs of decreased coronary artery perfusion - ex. Angina  - Evaluate fluid balance, assess for edema, trend weights  Outcome: Progressing  Goal: Absence of cardiac arrhythmias or at baseline  Description: INTERVENTIONS:  - Continuous cardiac monitoring, monitor vital signs, obtain 12 lead EKG if indicated  - Evaluate effectiveness of antiarrhythmic and heart rate control medications as ordered  - Initiate emergency measures for life threatening arrhythmias  - Monitor electrolytes and administer replacement therapy as ordered  Outcome: Progressing     Problem: RESPIRATORY - ADULT  Goal: Achieves  optimal ventilation and oxygenation  Description: INTERVENTIONS:  - Assess for changes in respiratory status  - Assess for changes in mentation and behavior  - Position to facilitate oxygenation and minimize respiratory effort  - Oxygen supplementation based on oxygen saturation or ABGs  - Provide Smoking Cessation handout, if applicable  - Encourage broncho-pulmonary hygiene including cough, deep breathe, Incentive Spirometry  - Assess the need for suctioning and perform as needed  - Assess and instruct to report SOB or any respiratory difficulty  - Respiratory Therapy support as indicated  - Manage/alleviate anxiety  - Monitor for signs/symptoms of CO2 retention  Outcome: Progressing     Problem: PAIN - ADULT  Goal: Verbalizes/displays adequate comfort level or patient's stated pain goal  Description: INTERVENTIONS:  - Encourage pt to monitor pain and request assistance  - Assess pain using appropriate pain scale  - Administer analgesics based on type and severity of pain and evaluate response  - Implement non-pharmacological measures as appropriate and evaluate response  - Consider cultural and social influences on pain and pain management  - Manage/alleviate anxiety  - Utilize distraction and/or relaxation techniques  - Monitor for opioid side effects  - Notify MD/LIP if interventions unsuccessful or patient reports new pain  - Anticipate increased pain with activity and pre-medicate as appropriate  Outcome: Progressing

## 2024-04-19 NOTE — PROGRESS NOTES
Progress Note  Giselle Weeks Patient Status:  Inpatient    1929 MRN [id number]   Location [facility] 8NE-A Attending Pamella Mccray MD   Hosp Day # 2 PCP Wicho Ward     Subjective:  Pt laying flat in bed, denies complaints. Pt with limited English - translation provided by family at bedside    Objective:  /66 (BP Location: Right arm)   Pulse 102   Temp 98.2 °F (36.8 °C) (Oral)   Resp 18   Ht 5' 2\" (1.575 m)   Wt 145 lb 1 oz (65.8 kg)   SpO2 100%   BMI 26.53 kg/m²     Telemetry: Afib, 100's-110's this am; 80's-90's overnight    Intake/Output:    Intake/Output Summary (Last 24 hours) at 2024 1137  Last data filed at 2024 0800  Gross per 24 hour   Intake 510 ml   Output 750 ml   Net -240 ml       Last 3 Weights   24 0514 145 lb 1 oz (65.8 kg)   24 0407 146 lb 2.6 oz (66.3 kg)   24 0145 143 lb 11.8 oz (65.2 kg)   24 156 lb (70.8 kg)   12/10/23 1245 151 lb (68.5 kg)       Labs:  Recent Labs   Lab 24  1532 24  1446 24  0510   *  --  148* 109*   BUN 18  --  25* 27*   CREATSERUM 1.17*  --  1.44* 1.84*   EGFRCR 43*  --  34* 25*   CA 8.8  --  9.4 8.7     --  136 137   K 3.6 5.1 4.2 4.5     --  108 107   CO2 22.0  --  22.0 22.0     Recent Labs   Lab 247 24  0455 24  0242 24  0510   RBC 3.67* 3.27*  --   --    HGB 11.6* 10.5*  --   --    HCT 34.0* 29.8*  --   --    MCV 92.6 91.1  --   --    MCH 31.6 32.1  --   --    MCHC 34.1 35.2  --   --    RDW 13.3 13.2  --   --    NEPRELIM 2.76  --   --   --    WBC 5.7 6.3  --   --    .0 185.0  185.0 175.0 157.0         Recent Labs   Lab 247   TROPHS 11       Diagnostics:  No results found.   Review of Systems   Constitutional: Negative.   Cardiovascular: Negative.    Respiratory: Negative.         Physical Exam:    Gen: alert, oriented x 3, NAD  Heent: pupils equal, reactive. Mucous membranes moist.   Neck: no  jvd  Cardiac: irregular rate and rhythm, normal S1,S2, +2/6 apical MARJORIE murmur, no clicks, rub or gallop  Lungs: CTA  Abd: soft, NT/ND +bs  Ext: trace ankle edema  Skin: Warm, dry  Neuro: No focal deficits      Medications:     metoprolol tartrate  75 mg Oral 2x Daily(Beta Blocker)    metoprolol tartrate  25 mg Oral Once    apixaban  2.5 mg Oral BID    aspirin  81 mg Oral Daily    atorvastatin  10 mg Oral Nightly    furosemide  40 mg Oral Daily      dilTIAZem Stopped (04/18/24 1439)       Assessment:  New Onset Atrial Fibrillation w/RVR  HR with modest control; rates 100's this am   On metoprolol 50mg po BID  [id number]: Started on Eliquis   Acute on Chronic HFpEF  proBNP 5950 on admit, CXR w/pulm edema  LVEF 60-65%, mod MR, mod TR, PASP 45-50  On po furosemide 40mg daily   I&O incomplete d/t incontinence, wt appears inaccurate  KHRIS - Cr increased today, 1.84  Hypertension - controlled  On metoprolol  Hyperlipidemia - statin     Plan:  Hold furosemide dose today with increased Cr. Can resume her usual furosemide 20mg po daily after discharge  HR mildly increased this am - increased metoprolol tartrate to 75mg po BID. Give extra 25mg po x1 now  Continue asa, statin, eliquis    Plan of care discussed with patient, RN.    GALDINO Fernández  4/19/2024  11:37 AM  [phone] [facility]  [phone] [facility]          Patient seen and examined independently.  Note reviewed and labs reviewed.  Agree to the assessment and plan with the following additions/changes.  Entire medical decision making & plan performed by myself.      A/P:  Afib w/ RVR. Improved, though still suboptimal  Increase metoprolol to 75mg PO BID  Hold lasix, and reassess Cr tomorrow as she might be volume deplete. May start lasix 20mg PO every day on Sunday  Continue ASA, Eliquis  Favor delaying DC until tomorrow    LOC L3    Radha Oconnell MD  4/19/2024  Interventional Cardiology  [facility]  [place]  =======================================================

## 2024-04-19 NOTE — DISCHARGE SUMMARY
[facility]     Giselle Weeks Patient Status:  Inpatient    1929 MRN [id number]   Location [facility] 8NE-A Attending Pamella Mccray MD   Hosp Day # 2 PCP Wicho Ward     Date of Admission: 2024  Date of Discharge:   2024    Discharge Disposition: Home or Self Care    Discharge Diagnosis:  #Atrial fibrillation with RVR   #Essential hypertension     -Cardizem drip  -Heparin drip  -metoprolol  -TSH, ECHO  -Monitor hemodynamics  -Telemetry  -Cardiology consult     #Acute on chronic diastolic heart failure   -Lasix 20 IV  -Resume Lasix 20 PO     #Dyslipidemia  -Statin     #Hyperglycemia       History of Present Illness: 89 years old female who speaks mainly or DuoNebs at home with her family presents with shortness of breath.    Brief Synopsis: Patient has history hypertension hyperlipidemia anemia chronic diastolic CHF and family provided translation.  She was diagnosed with A-fib RVR and started on Cardizem drip and heparin drip with plans to transition to Eliquis.  Patient was monitored on cardiac telemetry floor and she was seen by cardiology.  High proBNP was 5950 and chest x-ray showed evidence of congestion.  Patient improved with Lasix IV.  Plan is to discharge her on metoprolol Eliquis and Lasix.    Lace+ Score: 67  59-90 High Risk  29-58 Medium Risk  0-28   Low Risk       TCM Follow-Up Recommendation:  LACE > 58: High Risk of readmission after discharge from the hospital.      Procedures during hospitalization:       Incidental or significant findings and recommendations (brief descriptions):      Lab/Test resultspending at Discharge:       Consultants:  cardiology    Discharge Medication List:     Discharge Medications        START taking these medications        Instructions Prescription details   apixaban 5 MG Tabs  Commonly known as: Eliquis      Take 0.5 tablets (2.5 mg total) by mouth 2 (two) times daily.   Quantity: 60 tablet  Refills: 0            ASK  your doctor about these medications        Instructions Prescription details   albuterol (2.5 MG/3ML) 0.083% Nebu  Commonly known as: Ventolin      Take by nebulization every 6 (six) hours as needed for Wheezing.   Refills: 0     aspirin 81 MG Tbec      Take 1 tablet (81 mg total) by mouth.   Refills: 0     carvedilol 3.125 MG Tabs  Commonly known as: Coreg      Take 1 tablet (3.125 mg total) by mouth in the morning and 1 tablet (3.125 mg total) before bedtime.   Refills: 0     furosemide 20 MG Tabs  Commonly known as: Lasix      Take 1 tablet (20 mg total) by mouth daily.   Refills: 0     ipratropium 0.06 % Soln  Commonly known as: Atrovent      2 sprays by Nasal route 4 (four) times daily.   Quantity: 1 Bottle  Refills: 11     metoprolol tartrate 25 MG Tabs  Commonly known as: Lopressor      Take 1 tablet (25 mg total) by mouth.   Refills: 0     potassium chloride 10 MEQ Tbcr  Commonly known as: K-Dur      Take 10 mEq by mouth.   Refills: 0     simvastatin 20 MG Tabs  Commonly known as: Zocor      Take 1 tablet (20 mg total) by mouth.   Refills: 0     traMADol 50 MG Tabs  Commonly known as: Ultram      Take 1 tablet (50 mg total) by mouth. Takes as needed   Refills: 0               Where to Get Your Medications        These medications were sent to [address], [address] [phone], [phone]  [address] [place] [address], [place] [address], [facility] [address]      Phone: [phone]   apixaban 5 MG Tabs         [place]     Follow-up appointment:   Radha Oconnell MD  [address] [place] [address]  [phone]    Follow up in 2 week(s)      Appointments for Next 30 Days 4/19/2024 - 5/19/2024      None            Vital signs:  Temp:  [97.8 °F (36.6 °C)-98.2 °F (36.8 °C)] 98 °F (36.7 °C)  Pulse:  [] 93  Resp:  [16-18] 18  BP: (101-117)/(50-72) 116/56  SpO2:  [97 %-100 %] 100 %    Physical Exam:    General: No acute distress   Lungs: clear to  auscultation  Cardiovascular: S1, S2  Abdomen: Soft      -----------------------------------------------------------------------------------------------  PATIENT DISCHARGE INSTRUCTIONS: See electronic chart    Pamella Mccray MD    Total time spent on discharge plannin minutes     The  [employer] Cures Act makes medical notes like these available to patients in the interest of transparency. Please be advised this is a medical document. Medical documents are intended to carry relevant information, facts as evident, and the clinical opinion of the practitioner. The medical note is intended as peer to peer communication and may appear blunt or direct. It is written in medical language and may contain abbreviations or verbiage that are unfamiliar.

## 2024-04-19 NOTE — DISCHARGE INSTRUCTIONS
Sometimes managing your health at home requires assistance.  The [facility] team has recognized your preference to use [facility] [phone]  A representative from the home health agency will contact you or your family to schedule your first visit.

## 2024-04-20 LAB
ANION GAP SERPL CALC-SCNC: 6 MMOL/L (ref 0–18)
BUN BLD-MCNC: 30 MG/DL (ref 9–23)
CALCIUM BLD-MCNC: 8.8 MG/DL (ref 8.5–10.1)
CHLORIDE SERPL-SCNC: 103 MMOL/L (ref 98–112)
CO2 SERPL-SCNC: 24 MMOL/L (ref 21–32)
CREAT BLD-MCNC: 1.4 MG/DL
EGFRCR SERPLBLD CKD-EPI 2021: 35 ML/MIN/1.73M2 (ref 60–?)
ERYTHROCYTE [DISTWIDTH] IN BLOOD BY AUTOMATED COUNT: 13.3 %
GLUCOSE BLD-MCNC: 115 MG/DL (ref 70–99)
HCT VFR BLD AUTO: 29.5 %
HGB BLD-MCNC: 10 G/DL
MAGNESIUM SERPL-MCNC: 1.9 MG/DL (ref 1.6–2.6)
MCH RBC QN AUTO: 31.3 PG (ref 26–34)
MCHC RBC AUTO-ENTMCNC: 33.9 G/DL (ref 31–37)
MCV RBC AUTO: 92.2 FL
OSMOLALITY SERPL CALC.SUM OF ELEC: 283 MOSM/KG (ref 275–295)
PLATELET # BLD AUTO: 174 10(3)UL (ref 150–450)
POTASSIUM SERPL-SCNC: 4.1 MMOL/L (ref 3.5–5.1)
RBC # BLD AUTO: 3.2 X10(6)UL
SODIUM SERPL-SCNC: 133 MMOL/L (ref 136–145)
WBC # BLD AUTO: 8.3 X10(3) UL (ref 4–11)

## 2024-04-20 PROCEDURE — 99233 SBSQ HOSP IP/OBS HIGH 50: CPT | Performed by: HOSPITALIST

## 2024-04-20 RX ORDER — DILTIAZEM HYDROCHLORIDE 60 MG/1
120 TABLET, FILM COATED ORAL DAILY
Status: DISCONTINUED | OUTPATIENT
Start: 2024-04-20 | End: 2024-04-21

## 2024-04-20 RX ORDER — ATORVASTATIN CALCIUM 10 MG/1
10 TABLET, FILM COATED ORAL NIGHTLY
Qty: 30 TABLET | Refills: 0 | Status: SHIPPED | OUTPATIENT
Start: 2024-04-20

## 2024-04-20 RX ORDER — METOPROLOL TARTRATE 100 MG/1
100 TABLET ORAL
Status: DISCONTINUED | OUTPATIENT
Start: 2024-04-20 | End: 2024-04-21

## 2024-04-20 RX ORDER — METOPROLOL TARTRATE 75 MG/1
75 TABLET, FILM COATED ORAL 2 TIMES DAILY
Qty: 60 TABLET | Refills: 0 | Status: SHIPPED | OUTPATIENT
Start: 2024-04-20 | End: 2024-04-21

## 2024-04-20 RX ORDER — FUROSEMIDE 20 MG/1
20 TABLET ORAL DAILY
Qty: 30 TABLET | Refills: 0 | Status: SHIPPED | OUTPATIENT
Start: 2024-04-21 | End: 2024-04-21

## 2024-04-20 NOTE — PROGRESS NOTES
Progress Note  Giselle Weeks Patient Status:  Inpatient    1929 MRN [id number]   Location [facility] 8NE-A Attending Pamella Mccray MD   Hosp Day # 3 PCP Wicho Ward     Subjective:  Eating breakfast with daughter's assistance. No chest pain or dyspnea.  L arm pain which improves with massage from daughter and worsens with certain movements.  Heart rates remain elevated    Objective:  Physical Exam:   /73 (BP Location: Right arm)   Pulse 103   Temp 97.8 °F (36.6 °C) (Oral)   Resp 18   Ht 5' 2\" (1.575 m)   Wt 145 lb 1 oz (65.8 kg)   SpO2 94%   BMI 26.53 kg/m²   Temp (24hrs), Av °F (36.7 °C), Min:97.8 °F (36.6 °C), Max:98.3 °F (36.8 °C)       Intake/Output Summary (Last 24 hours) at 2024 0645  Last data filed at 2024 0535  Gross per 24 hour   Intake 1020 ml   Output 600 ml   Net 420 ml     Wt Readings from Last 3 Encounters:   24 145 lb 1 oz (65.8 kg)   12/10/23 151 lb (68.5 kg)     Telemetry: afib ventricular rates in the 110's  General: Alert seated comfortably in bed on room air.  HEENT: No focal deficits.  Neck: No JVD, carotids 2+ no bruits.  Cardiac: Irregularly irregular S1, S2 normal, no murmur, rub or gallop.  Lungs: Clear without wheezes, rales, rhonchi or dullness.  Normal excursions and effort.  Abdomen: Soft, non-tender.   Extremities: Without clubbing, cyanosis or edema.  Peripheral pulses are 2+.  Neurologic: Alert and oriented, normal affect.  Skin: Warm and dry.      Intake/Output:    Intake/Output Summary (Last 24 hours) at 2024 0645  Last data filed at 2024 0535  Gross per 24 hour   Intake 1020 ml   Output 600 ml   Net 420 ml       Last 3 Weights   24 0514 145 lb 1 oz (65.8 kg)   24 0407 146 lb 2.6 oz (66.3 kg)   24 0145 143 lb 11.8 oz (65.2 kg)   24 156 lb (70.8 kg)   12/10/23 1245 151 lb (68.5 kg)       Labs:  Recent Labs   Lab 24  1446 24  0510 24  0447   * 109* 115*   BUN 25* 27*  30*   CREATSERUM 1.44* 1.84* 1.40*   EGFRCR 34* 25* 35*   CA 9.4 8.7 8.8    137 133*   K 4.2 4.5 4.1    107 103   CO2 22.0 22.0 24.0     Recent Labs   Lab 04/16/24 2037 04/17/24  0455 04/18/24  0242 04/19/24  0510 04/20/24  0447   RBC 3.67* 3.27*  --   --  3.20*   HGB 11.6* 10.5*  --   --  10.0*   HCT 34.0* 29.8*  --   --  29.5*   MCV 92.6 91.1  --   --  92.2   MCH 31.6 32.1  --   --  31.3   MCHC 34.1 35.2  --   --  33.9   RDW 13.3 13.2  --   --  13.3   NEPRELIM 2.76  --   --   --   --    WBC 5.7 6.3  --   --  8.3   .0 185.0  185.0 175.0 157.0 174.0         Recent Labs   Lab 04/16/24 2037   TROPHS 11       Diagnostics:  No results found.   Review of Systems   Constitutional: Negative.   Cardiovascular: Negative.    Respiratory: Negative.           Medications:     metoprolol tartrate  75 mg Oral 2x Daily(Beta Blocker)    apixaban  2.5 mg Oral BID    aspirin  81 mg Oral Daily    atorvastatin  10 mg Oral Nightly    [Held by provider] furosemide  40 mg Oral Daily           Assessment:  New Onset Atrial Fibrillation w/ RVR  Ventricular rates remain mildly elevated  On metoprolol 75 mg po BID - increase to 100 mg BID  [id number] 4: Started on Eliquis 2.5 mg BID (age-renal function)  Acute on Chronic HFpEF  Pro-BNP 5,950 on admit, CXR w/pulm edema  LVEF 60-65%, mod MR, mod TR, PASP 45-50  Had been on PO furosemide 40 mg held yesterday KHRIS- Cr improved today  KHRIS   Cr increased today 1.84 improved to 1.4 - baseline ~1.2 at admit although which CHF at presentation  Hypertension   Controlled  On metoprolol  Hyperlipidemia   statin     Plan:  Cr improved holding lasix -to restart 20 mg PO daily outpatient  Metoprolol 100 mg BID for heart rates (25 mg added this AM)  Continue asa, statin, Eliquis  If rates are controlled and she is at her outpatient baseline it would be reasonable to discharge this afternoon from a cardiac perspective      Greg Gomez PA-C  4/20/2024  06:45 AM  [phone] [place]  [facility]  [phone] [facility]        Note reviewed and labs reviewed.  Agree with above assessment and plan.  Rate control strategy for afib.  Add long acting CCB for better HR control.  No objection to discharge home with low dose DOAC for stroke prevention.    CINDY Rincon MD

## 2024-04-20 NOTE — PLAN OF CARE
NURSING NOTES:  0800: Pt received AOx4 according to pt's daughter in law at the bedside. Pt speaks [place]. Room air. At-fib 's. Saline lock. Purewick.  0900: Pt refused to sit up in the chair and to stand to be weigh. Weight taken using bed scale. Denies pain.  1000: Dr. Rincon added Cardizem daily.  1730: Full bath and full linen changed. Up pt in the chair.    Problem: CARDIOVASCULAR - ADULT  Goal: Maintains optimal cardiac output and hemodynamic stability  Description: INTERVENTIONS:  - Monitor vital signs, rhythm, and trends  - Monitor for bleeding, hypotension and signs of decreased cardiac output  - Evaluate effectiveness of vasoactive medications to optimize hemodynamic stability  - Monitor arterial and/or venous puncture sites for bleeding and/or hematoma  - Assess quality of pulses, skin color and temperature  - Assess for signs of decreased coronary artery perfusion - ex. Angina  - Evaluate fluid balance, assess for edema, trend weights  Outcome: Progressing

## 2024-04-20 NOTE — PROGRESS NOTES
[facility]   part of [facility]     Hospitalist Progress Note     Giselle Weeks Patient Status:  Inpatient    1929 MRN [id number]   [facility] 8NE-A Attending Pamella Mccray MD   Hosp Day # 3 PCP Wicho Ward     Chief Complaint: palpitations    Subjective:     Patient is c/o feeling weak and tired    Objective:    Review of Systems:   A comprehensive review of systems was completed; pertinent positive and negatives stated in subjective.    Vital signs:  Temp:  [97.8 °F (36.6 °C)-98.3 °F (36.8 °C)] 97.8 °F (36.6 °C)  Pulse:  [] 103  Resp:  [14-18] 18  BP: (105-128)/(56-73) 127/73  SpO2:  [94 %-100 %] 94 %    Physical Exam:    General: No acute distress  Respiratory: No wheezes, no rhonchi  Cardiovascular: S1, S2, regular rate and rhythm  Abdomen: Soft, Non-tender, non-distended, positive bowel sounds  Neuro: No new focal deficits.   Extremities: No edema      Diagnostic Data:    Labs:  Recent Labs   Lab 24  0242 24  0510 24  0447   WBC 5.7 6.3  --   --  8.3   HGB 11.6* 10.5*  --   --  10.0*   MCV 92.6 91.1  --   --  92.2   .0 185.0  185.0 175.0 157.0 174.0   INR 1.09  --   --   --   --        Recent Labs   Lab 24  1446 24  0510 24  0447   *   < > 148* 109* 115*   BUN 20   < > 25* 27* 30*   CREATSERUM 1.17*   < > 1.44* 1.84* 1.40*   CA 9.1   < > 9.4 8.7 8.8   ALB 3.4  --   --   --   --    *   < > 136 137 133*   K 4.1   < > 4.2 4.5 4.1      < > 108 107 103   CO2 19.0*   < > 22.0 22.0 24.0   ALKPHO 82  --   --   --   --    AST 21  --   --   --   --    ALT 19  --   --   --   --    BILT 0.3  --   --   --   --    TP 8.1  --   --   --   --     < > = values in this interval not displayed.       Estimated Creatinine Clearance: 19.4 mL/min (A) (based on SCr of 1.4 mg/dL (H)).    Recent Labs   Lab 24   TROPHS 11       Recent Labs   Lab 24    PTP 14.2   INR 1.09                    Microbiology    No results found for this visit on 04/16/24.      Imaging: Reviewed in Epic.    Medications:    metoprolol tartrate  100 mg Oral 2x Daily(Beta Blocker)    metoprolol tartrate  25 mg Oral Once    apixaban  2.5 mg Oral BID    aspirin  81 mg Oral Daily    atorvastatin  10 mg Oral Nightly    [Held by provider] furosemide  40 mg Oral Daily       Assessment & Plan:      #Atrial fibrillation with RVR   #Essential hypertension    -Cardizem drip  -Heparin drip  -metoprolol  -TSH, ECHO  -Monitor hemodynamics  -Telemetry  -Cardiology consult     #Acute on chronic diastolic heart failure   -Lasix 20 IV  -Resume Lasix 20 PO     #Dyslipidemia  -Statin     #Hyperglycemia        Pamella Mccray MD    Supplementary Documentation:     Quality:  DVT Mechanical Prophylaxis:        DVT Pharmacologic Prophylaxis   Medication    apixaban (Eliquis) tab 2.5 mg         DVT Pharmacologic prophylaxis: Aspirin 81 mg      Code Status: Not on file  Quigley: External urinary catheter in place  Quigley Duration (in days):   Central line:    SHELLY: 4/20/2024    Discharge is dependent on: progress  At this point Ms. Weeks is expected to be discharge to: home    The 21st Century Cures Act makes medical notes like these available to patients in the interest of transparency. Please be advised this is a medical document. Medical documents are intended to carry relevant information, facts as evident, and the clinical opinion of the practitioner. The medical note is intended as peer to peer communication and may appear blunt or direct. It is written in medical language and may contain abbreviations or verbiage that are unfamiliar.             **Certification      PHYSICIAN Certification of Need for Inpatient Hospitalization - Initial Certification    Patient will require inpatient services that will reasonably be expected to span two midnight's based on the clinical documentation in H+P.   Based on patients  current state of illness, I anticipate that, after discharge, patient will require TBD.

## 2024-04-20 NOTE — PLAN OF CARE
Patient received with family at bedside, alert and oriented x 4. [place] speaking, translation provided by family at bedside  Up  x1 with walker. On RA. Afib on tele, HR in the 90's -110's at rest. Continent of bowel, purewick in place. No complaints of pain, shortness of breath or chest pain/discomfort. POC : Monitor HR. Fall precautions in place. Call light within reach.    Problem: Patient/Family Goals  Goal: Patient/Family Long Term Goal  Description: Patient's Long Term Goal: stay out of hospital     Interventions:  - medication compliance  -follow up with primary care physician  - See additional Care Plan goals for specific interventions  Outcome: Progressing  Goal: Patient/Family Short Term Goal  Description: Patient's Short Term Goal: go home    Interventions:   - heparin gtt  - Cardizem gtt  -po Cardizem   - cardiology to see  - See additional Care Plan goals for specific interventions  Outcome: Progressing     Problem: CARDIOVASCULAR - ADULT  Goal: Maintains optimal cardiac output and hemodynamic stability  Description: INTERVENTIONS:  - Monitor vital signs, rhythm, and trends  - Monitor for bleeding, hypotension and signs of decreased cardiac output  - Evaluate effectiveness of vasoactive medications to optimize hemodynamic stability  - Monitor arterial and/or venous puncture sites for bleeding and/or hematoma  - Assess quality of pulses, skin color and temperature  - Assess for signs of decreased coronary artery perfusion - ex. Angina  - Evaluate fluid balance, assess for edema, trend weights  Outcome: Progressing  Goal: Absence of cardiac arrhythmias or at baseline  Description: INTERVENTIONS:  - Continuous cardiac monitoring, monitor vital signs, obtain 12 lead EKG if indicated  - Evaluate effectiveness of antiarrhythmic and heart rate control medications as ordered  - Initiate emergency measures for life threatening arrhythmias  - Monitor electrolytes and administer replacement therapy as  ordered  Outcome: Progressing     Problem: RESPIRATORY - ADULT  Goal: Achieves optimal ventilation and oxygenation  Description: INTERVENTIONS:  - Assess for changes in respiratory status  - Assess for changes in mentation and behavior  - Position to facilitate oxygenation and minimize respiratory effort  - Oxygen supplementation based on oxygen saturation or ABGs  - Provide Smoking Cessation handout, if applicable  - Encourage broncho-pulmonary hygiene including cough, deep breathe, Incentive Spirometry  - Assess the need for suctioning and perform as needed  - Assess and instruct to report SOB or any respiratory difficulty  - Respiratory Therapy support as indicated  - Manage/alleviate anxiety  - Monitor for signs/symptoms of CO2 retention  Outcome: Progressing     Problem: PAIN - ADULT  Goal: Verbalizes/displays adequate comfort level or patient's stated pain goal  Description: INTERVENTIONS:  - Encourage pt to monitor pain and request assistance  - Assess pain using appropriate pain scale  - Administer analgesics based on type and severity of pain and evaluate response  - Implement non-pharmacological measures as appropriate and evaluate response  - Consider cultural and social influences on pain and pain management  - Manage/alleviate anxiety  - Utilize distraction and/or relaxation techniques  - Monitor for opioid side effects  - Notify MD/LIP if interventions unsuccessful or patient reports new pain  - Anticipate increased pain with activity and pre-medicate as appropriate  Outcome: Progressing     Problem: RISK FOR INFECTION - ADULT  Goal: Absence of fever/infection during anticipated neutropenic period  Description: INTERVENTIONS  - Monitor WBC  - Administer growth factors as ordered  - Implement neutropenic guidelines  Outcome: Progressing     Problem: SAFETY ADULT - FALL  Goal: Free from fall injury  Description: INTERVENTIONS:  - Assess pt frequently for physical needs  - Identify cognitive and physical  deficits and behaviors that affect risk of falls.  - [place] fall precautions as indicated by assessment.  - Educate pt/family on patient safety including physical limitations  - Instruct pt to call for assistance with activity based on assessment  - Modify environment to reduce risk of injury  - Provide assistive devices as appropriate  - Consider OT/PT consult to assist with strengthening/mobility  - Encourage toileting schedule  Outcome: Progressing     Problem: DISCHARGE PLANNING  Goal: Discharge to home or other facility with appropriate resources  Description: INTERVENTIONS:  - Identify barriers to discharge w/pt and caregiver  - Include patient/family/discharge partner in discharge planning  - Arrange for needed discharge resources and transportation as appropriate  - Identify discharge learning needs (meds, wound care, etc)  - Arrange for interpreters to assist at discharge as needed  - Consider post-discharge preferences of patient/family/discharge partner  - Complete POLST form as appropriate  - Assess patient's ability to be responsible for managing their own health  - Refer to Case Management Department for coordinating discharge planning if the patient needs post-hospital services based on physician/LIP order or complex needs related to functional status, cognitive ability or social support system  Outcome: Progressing     Problem: Altered Communication/Language Barrier  Goal: Patient/Family is able to understand and participate in their care  Description: Interventions:  - Assess communication ability and preferred communication style  - Implement communication aides and strategies  - Use visual cues when possible  - Listen attentively, be patient, do not interrupt  - Minimize distractions  - Allow time for understanding and response  - Establish method for patient to ask for assistance (call light)  - Provide an  as needed  - Communicate barriers and strategies to overcome with those who  interact with patient  Outcome: Progressing     Problem: Patient/Family Goals  Goal: Patient/Family Long Term Goal  Description: Patient's Long Term Goal: stay out of hospital     Interventions:  - medication compliance  -follow up with primary care physician  - See additional Care Plan goals for specific interventions  Outcome: Progressing  Goal: Patient/Family Short Term Goal  Description: Patient's Short Term Goal: go home    Interventions:   - heparin gtt  - Cardizem gtt  -po Cardizem   - cardiology to see  - See additional Care Plan goals for specific interventions  Outcome: Progressing     Problem: CARDIOVASCULAR - ADULT  Goal: Maintains optimal cardiac output and hemodynamic stability  Description: INTERVENTIONS:  - Monitor vital signs, rhythm, and trends  - Monitor for bleeding, hypotension and signs of decreased cardiac output  - Evaluate effectiveness of vasoactive medications to optimize hemodynamic stability  - Monitor arterial and/or venous puncture sites for bleeding and/or hematoma  - Assess quality of pulses, skin color and temperature  - Assess for signs of decreased coronary artery perfusion - ex. Angina  - Evaluate fluid balance, assess for edema, trend weights  Outcome: Progressing  Goal: Absence of cardiac arrhythmias or at baseline  Description: INTERVENTIONS:  - Continuous cardiac monitoring, monitor vital signs, obtain 12 lead EKG if indicated  - Evaluate effectiveness of antiarrhythmic and heart rate control medications as ordered  - Initiate emergency measures for life threatening arrhythmias  - Monitor electrolytes and administer replacement therapy as ordered  Outcome: Progressing     Problem: RESPIRATORY - ADULT  Goal: Achieves optimal ventilation and oxygenation  Description: INTERVENTIONS:  - Assess for changes in respiratory status  - Assess for changes in mentation and behavior  - Position to facilitate oxygenation and minimize respiratory effort  - Oxygen supplementation based on  oxygen saturation or ABGs  - Provide Smoking Cessation handout, if applicable  - Encourage broncho-pulmonary hygiene including cough, deep breathe, Incentive Spirometry  - Assess the need for suctioning and perform as needed  - Assess and instruct to report SOB or any respiratory difficulty  - Respiratory Therapy support as indicated  - Manage/alleviate anxiety  - Monitor for signs/symptoms of CO2 retention  Outcome: Progressing     Problem: PAIN - ADULT  Goal: Verbalizes/displays adequate comfort level or patient's stated pain goal  Description: INTERVENTIONS:  - Encourage pt to monitor pain and request assistance  - Assess pain using appropriate pain scale  - Administer analgesics based on type and severity of pain and evaluate response  - Implement non-pharmacological measures as appropriate and evaluate response  - Consider cultural and social influences on pain and pain management  - Manage/alleviate anxiety  - Utilize distraction and/or relaxation techniques  - Monitor for opioid side effects  - Notify MD/LIP if interventions unsuccessful or patient reports new pain  - Anticipate increased pain with activity and pre-medicate as appropriate  Outcome: Progressing     Problem: RISK FOR INFECTION - ADULT  Goal: Absence of fever/infection during anticipated neutropenic period  Description: INTERVENTIONS  - Monitor WBC  - Administer growth factors as ordered  - Implement neutropenic guidelines  Outcome: Progressing     Problem: SAFETY ADULT - FALL  Goal: Free from fall injury  Description: INTERVENTIONS:  - Assess pt frequently for physical needs  - Identify cognitive and physical deficits and behaviors that affect risk of falls.  - [place] fall precautions as indicated by assessment.  - Educate pt/family on patient safety including physical limitations  - Instruct pt to call for assistance with activity based on assessment  - Modify environment to reduce risk of injury  - Provide assistive devices as  appropriate  - Consider OT/PT consult to assist with strengthening/mobility  - Encourage toileting schedule  Outcome: Progressing     Problem: DISCHARGE PLANNING  Goal: Discharge to home or other facility with appropriate resources  Description: INTERVENTIONS:  - Identify barriers to discharge w/pt and caregiver  - Include patient/family/discharge partner in discharge planning  - Arrange for needed discharge resources and transportation as appropriate  - Identify discharge learning needs (meds, wound care, etc)  - Arrange for interpreters to assist at discharge as needed  - Consider post-discharge preferences of patient/family/discharge partner  - Complete POLST form as appropriate  - Assess patient's ability to be responsible for managing their own health  - Refer to Case Management Department for coordinating discharge planning if the patient needs post-hospital services based on physician/LIP order or complex needs related to functional status, cognitive ability or social support system  Outcome: Progressing     Problem: Altered Communication/Language Barrier  Goal: Patient/Family is able to understand and participate in their care  Description: Interventions:  - Assess communication ability and preferred communication style  - Implement communication aides and strategies  - Use visual cues when possible  - Listen attentively, be patient, do not interrupt  - Minimize distractions  - Allow time for understanding and response  - Establish method for patient to ask for assistance (call light)  - Provide an  as needed  - Communicate barriers and strategies to overcome with those who interact with patient  Outcome: Progressing     Problem: Patient/Family Goals  Goal: Patient/Family Long Term Goal  Description: Patient's Long Term Goal: stay out of hospital     Interventions:  - medication compliance  -follow up with primary care physician  - See additional Care Plan goals for specific  interventions  Outcome: Progressing  Goal: Patient/Family Short Term Goal  Description: Patient's Short Term Goal: go home    Interventions:   - heparin gtt  - Cardizem gtt  -po Cardizem   - cardiology to see  - See additional Care Plan goals for specific interventions  Outcome: Progressing     Problem: CARDIOVASCULAR - ADULT  Goal: Maintains optimal cardiac output and hemodynamic stability  Description: INTERVENTIONS:  - Monitor vital signs, rhythm, and trends  - Monitor for bleeding, hypotension and signs of decreased cardiac output  - Evaluate effectiveness of vasoactive medications to optimize hemodynamic stability  - Monitor arterial and/or venous puncture sites for bleeding and/or hematoma  - Assess quality of pulses, skin color and temperature  - Assess for signs of decreased coronary artery perfusion - ex. Angina  - Evaluate fluid balance, assess for edema, trend weights  Outcome: Progressing  Goal: Absence of cardiac arrhythmias or at baseline  Description: INTERVENTIONS:  - Continuous cardiac monitoring, monitor vital signs, obtain 12 lead EKG if indicated  - Evaluate effectiveness of antiarrhythmic and heart rate control medications as ordered  - Initiate emergency measures for life threatening arrhythmias  - Monitor electrolytes and administer replacement therapy as ordered  Outcome: Progressing     Problem: RESPIRATORY - ADULT  Goal: Achieves optimal ventilation and oxygenation  Description: INTERVENTIONS:  - Assess for changes in respiratory status  - Assess for changes in mentation and behavior  - Position to facilitate oxygenation and minimize respiratory effort  - Oxygen supplementation based on oxygen saturation or ABGs  - Provide Smoking Cessation handout, if applicable  - Encourage broncho-pulmonary hygiene including cough, deep breathe, Incentive Spirometry  - Assess the need for suctioning and perform as needed  - Assess and instruct to report SOB or any respiratory difficulty  - Respiratory  Therapy support as indicated  - Manage/alleviate anxiety  - Monitor for signs/symptoms of CO2 retention  Outcome: Progressing     Problem: PAIN - ADULT  Goal: Verbalizes/displays adequate comfort level or patient's stated pain goal  Description: INTERVENTIONS:  - Encourage pt to monitor pain and request assistance  - Assess pain using appropriate pain scale  - Administer analgesics based on type and severity of pain and evaluate response  - Implement non-pharmacological measures as appropriate and evaluate response  - Consider cultural and social influences on pain and pain management  - Manage/alleviate anxiety  - Utilize distraction and/or relaxation techniques  - Monitor for opioid side effects  - Notify MD/LIP if interventions unsuccessful or patient reports new pain  - Anticipate increased pain with activity and pre-medicate as appropriate  Outcome: Progressing     Problem: RISK FOR INFECTION - ADULT  Goal: Absence of fever/infection during anticipated neutropenic period  Description: INTERVENTIONS  - Monitor WBC  - Administer growth factors as ordered  - Implement neutropenic guidelines  Outcome: Progressing     Problem: SAFETY ADULT - FALL  Goal: Free from fall injury  Description: INTERVENTIONS:  - Assess pt frequently for physical needs  - Identify cognitive and physical deficits and behaviors that affect risk of falls.  - [place] fall precautions as indicated by assessment.  - Educate pt/family on patient safety including physical limitations  - Instruct pt to call for assistance with activity based on assessment  - Modify environment to reduce risk of injury  - Provide assistive devices as appropriate  - Consider OT/PT consult to assist with strengthening/mobility  - Encourage toileting schedule  Outcome: Progressing     Problem: DISCHARGE PLANNING  Goal: Discharge to home or other facility with appropriate resources  Description: INTERVENTIONS:  - Identify barriers to discharge w/pt and caregiver  -  Include patient/family/discharge partner in discharge planning  - Arrange for needed discharge resources and transportation as appropriate  - Identify discharge learning needs (meds, wound care, etc)  - Arrange for interpreters to assist at discharge as needed  - Consider post-discharge preferences of patient/family/discharge partner  - Complete POLST form as appropriate  - Assess patient's ability to be responsible for managing their own health  - Refer to Case Management Department for coordinating discharge planning if the patient needs post-hospital services based on physician/LIP order or complex needs related to functional status, cognitive ability or social support system  Outcome: Progressing     Problem: Altered Communication/Language Barrier  Goal: Patient/Family is able to understand and participate in their care  Description: Interventions:  - Assess communication ability and preferred communication style  - Implement communication aides and strategies  - Use visual cues when possible  - Listen attentively, be patient, do not interrupt  - Minimize distractions  - Allow time for understanding and response  - Establish method for patient to ask for assistance (call light)  - Provide an  as needed  - Communicate barriers and strategies to overcome with those who interact with patient  Outcome: Progressing

## 2024-04-21 VITALS
HEART RATE: 80 BPM | TEMPERATURE: 98 F | WEIGHT: 143.31 LBS | HEIGHT: 62 IN | DIASTOLIC BLOOD PRESSURE: 58 MMHG | OXYGEN SATURATION: 98 % | RESPIRATION RATE: 16 BRPM | BODY MASS INDEX: 26.37 KG/M2 | SYSTOLIC BLOOD PRESSURE: 102 MMHG

## 2024-04-21 PROCEDURE — 99239 HOSP IP/OBS DSCHRG MGMT >30: CPT | Performed by: HOSPITALIST

## 2024-04-21 RX ORDER — FUROSEMIDE 20 MG/1
20 TABLET ORAL DAILY
Qty: 30 TABLET | Refills: 0 | Status: SHIPPED | OUTPATIENT
Start: 2024-04-24

## 2024-04-21 RX ORDER — DILTIAZEM HYDROCHLORIDE 120 MG/1
120 TABLET, FILM COATED ORAL DAILY
Qty: 30 TABLET | Refills: 3 | Status: SHIPPED | OUTPATIENT
Start: 2024-04-22

## 2024-04-21 RX ORDER — METOPROLOL TARTRATE 100 MG/1
100 TABLET ORAL
Qty: 60 TABLET | Refills: 3 | Status: SHIPPED | OUTPATIENT
Start: 2024-04-21

## 2024-04-21 NOTE — PHYSICAL THERAPY NOTE
PHYSICAL THERAPY TREATMENT NOTE - INPATIENT    Room Number: 8622/8622-A     Session: 1     Number of Visits to Meet Established Goals: 4    Presenting Problem: afib with RVR, acute on chronic diastolic heart failure  Co-Morbidities : HTN, CHF    ASSESSMENT   Patient demonstrates good  progress this session, goals  updated to reflect patient performance.  Pt able to advance ambulation distance, however with increased time and need for standing rest breaks.  HR under 100bpm during activities.  P/family receptive to education points on transfers and gait posture and sequencing.  HHPT recommended to increase level of indep and return to baseline.  Given social support, pt appropriate to discharge home when ready.    Patient continues to function near baseline with bed mobility, transfers, and gait.  Contributing factors to remaining limitations include decreased functional strength, decreased endurance/aerobic capacity, and decreased muscular endurance.  Next session anticipate patient to progress transfers, gait, and standing prolonged periods.  Physical Therapy will continue to follow patient for duration of hospitalization.    Patient continues to benefit from continued skilled PT services: at discharge to promote functional independence and safety with additional support and return home with home health PT.    PLAN  PT Treatment Plan: Bed mobility;Endurance;Energy conservation;Patient education;Family education;Gait training;Strengthening;Transfer training;Balance training  Rehab Potential : Good  Frequency (Obs): 3x/week    CURRENT GOALS     Goal #1 Patient is able to demonstrate supine - sit EOB @ level: supervision      Goal #2 Patient is able to demonstrate transfers Sit to/from Stand at assistance level: supervision      Goal #3 Patient is able to ambulate 100 feet with assist device: walker - rolling at assistance level: supervision       Goal #4     Goal #5     Goal #6     Goal Comments: Goals established  on 2024 all goals progressing    HOME SITUATION  Type of Home: House   Home Layout: One level     Lives With:  (DIL)  Drives: No  Patient Owned Equipment: Rollator     Prior Level of [place]: Pt is typically mod ind with ADLs and ambulates with a rollator mod ind. Pt's DIL is able to assist if needed.      SUBJECTIVE  Denies SOB, c/o UE fatigue during ambulation    OBJECTIVE  Precautions: Bed/chair alarm; needed    WEIGHT BEARING RESTRICTION  Weight Bearing Restriction: None                PAIN ASSESSMENT   Ratin  Location: LUE  Management Techniques: Activity promotion;Relaxation;Repositioning    BALANCE                                                                                                                       Static Sitting: Good  Dynamic Sitting: Good           Static Standing: Poor +  Dynamic Standing: Poor +    ACTIVITY TOLERANCE  Pulse: 103        BP: 108/59        Patient Position: Sitting    O2 WALK  Oxygen Therapy  SPO2% Ambulation on Room Air: 98      AM-PAC '6-Clicks' INPATIENT SHORT FORM - BASIC MOBILITY  How much difficulty does the patient currently have...  Patient Difficulty: Turning over in bed (including adjusting bedclothes, sheets and blankets)?: A Little   Patient Difficulty: Sitting down on and standing up from a chair with arms (e.g., wheelchair, bedside commode, etc.): A Little   Patient Difficulty: Moving from lying on back to sitting on the side of the bed?: A Little   How much help from another person does the patient currently need...   Help from Another: Moving to and from a bed to a chair (including a wheelchair)?: A Little   Help from Another: Need to walk in hospital room?: A Little   Help from Another: Climbing 3-5 steps with a railing?: A Little       AM-PAC Score:  Raw Score: 18   Approx Degree of Impairment: 46.58%   Standardized Score (AM-PAC Scale): 43.63   CMS Modifier (G-Code): CK    FUNCTIONAL ABILITY STATUS  Gait Assessment    Functional Mobility/Gait Assessment  Gait Assistance: Contact guard assist  Distance (ft): 80  Assistive Device: Rolling walker  Pattern: Shuffle (kyphotic posture, RW far ahead of pt)    Skilled Therapy Provided    Bed Mobility:  Rolling: min A   Supine<>Sit: min A at trunk  Sitting balance: supervision, able to reach out of LUL  Scoots to EOB with min A       Transfer Mobility:  Sit<>Stand: min A progressing to CGA, vcs for hand placement and sequencing   Stand<>Sit: CGA  Donning of undergarments mod/max A  Tolerated standing >2 mins with single UE support without LOB supervision   Gait: 80ft with RW, step to gait pattern, forward head; and inc use of UE; vcs for upright posture, CGA, self-selects standing rest breaks throughout entire distance, requires breaks and uses walker for elbow support; cues to increased weight through LE, upright posture to decrease load on UE    Education: discussed improved UE endurance when using the rollator (baseline device used at home) vs using the RW, encouraged upright mobility and sitting in chair, short distance ambulation, reviewed proper transfer techniques and hand placement, discussed dc recs, pt/family in agreement        Patient End of Session: Up in chair;Needs met;Call light within reach;RN aware of session/findings;All patient questions and concerns addressed;Family present    PT Session Time: 30 minutes  Gait Training: 15 minutes  Therapeutic Activity: 15 minutes

## 2024-04-21 NOTE — PROGRESS NOTES
Progress Note  Giselle Weeks Patient Status:  Inpatient    1929 MRN [id number]   [facility] 8NE-A Attending Pamella Mccray MD   Hosp Day # 4 MISAEL Ward     Subjective:  No acute events overnight.  Resting in bed this morning, on room air, denies any cardiac symptoms at this time.    Objective:  /59   Pulse 103   Temp 98 °F (36.7 °C) (Oral)   Resp 16   Ht 5' 2\" (1.575 m)   Wt 143 lb 4.8 oz (65 kg)   SpO2 98%   BMI 26.21 kg/m²     Telemetry: a-fib, HR 80-100s      Intake/Output:    Intake/Output Summary (Last 24 hours) at 2024 1120  Last data filed at 2024 0914  Gross per 24 hour   Intake 300 ml   Output 350 ml   Net -50 ml       Last 3 Weights   24 0905 143 lb 4.8 oz (65 kg)   24 0836 142 lb (64.4 kg)   24 0514 145 lb 1 oz (65.8 kg)   24 0407 146 lb 2.6 oz (66.3 kg)   24 0145 143 lb 11.8 oz (65.2 kg)   24 156 lb (70.8 kg)   12/10/23 1245 151 lb (68.5 kg)       Labs:  Recent Labs   Lab 24  1446 24  0510 24  0447   * 109* 115*   BUN 25* 27* 30*   CREATSERUM 1.44* 1.84* 1.40*   EGFRCR 34* 25* 35*   CA 9.4 8.7 8.8    137 133*   K 4.2 4.5 4.1    107 103   CO2 22.0 22.0 24.0     Recent Labs   Lab 247 24  0455 24  0242 24  0510 24  0447   RBC 3.67* 3.27*  --   --  3.20*   HGB 11.6* 10.5*  --   --  10.0*   HCT 34.0* 29.8*  --   --  29.5*   MCV 92.6 91.1  --   --  92.2   MCH 31.6 32.1  --   --  31.3   MCHC 34.1 35.2  --   --  33.9   RDW 13.3 13.2  --   --  13.3   NEPRELIM 2.76  --   --   --   --    WBC 5.7 6.3  --   --  8.3   .0 185.0  185.0 175.0 157.0 174.0         Recent Labs   Lab 247   TROPHS 11       Review of Systems   Constitutional: Negative.   Cardiovascular:  Positive for irregular heartbeat.   Respiratory: Negative.         Physical Exam:    Gen: alert, oriented x 3, NAD  Heent: pupils equal, reactive. Mucous membranes moist.    Neck: no jvd  Cardiac: irregular rate and rhythm, normal S1,S2, no murmur, gallop or rub   Lungs: CTA  Abd: soft, NT/ND +bs  Ext: no edema  Skin: Warm, dry  Neuro: No focal deficits        Medications:     metoprolol tartrate  100 mg Oral 2x Daily(Beta Blocker)    dilTIAZem  120 mg Oral Daily    apixaban  2.5 mg Oral BID    aspirin  81 mg Oral Daily    atorvastatin  10 mg Oral Nightly    [Held by provider] furosemide  40 mg Oral Daily       Assessment:  New Onset Atrial Fibrillation w/RVR  HR with modest control; rates 100's this am   On metoprolol 100mg po BID, Diltiazem 120 mg   [id number]: Started on Eliquis   TSH wnl  Acute on Chronic HFpEF  proBNP 5950 on admit, CXR w/pulm edema  LVEF 60-65%, mod MR, mod TR, PASP 45-50  On po furosemide 40mg daily -currently held d/t KHRIS   I&O incomplete d/t incontinence, wt appears inaccurate  KHRIS - Cr increased today, 1.40  Hypertension - controlled  On metoprolol  Hyperlipidemia - statin     Plan:  Sustaining a-fib on the tele monitor, rates controlled [id number] metoprolol, diltiazem.   Continue elqiuis for stroke prevention.  Continue asa, statin.   Holding oral furosemide with KHRIS, crt 1.40 [id number] resume OP.  Tentative plan to dc today, ok with cardiology, will schedule follow up with Dr Oconnell in 1-2 weeks.     Plan of care discussed with patient, RN.    Lucina Lorenzo, GALDINO  4/21/2024  11:20 AM  [phone]       Patient seen and examined independently.  Note reviewed and labs reviewed.  Agree with above assessment and plan.  Metoprolol 100 mg BID and diltiazem 120 mg daily. Eliquis for stroke prevention.    CINDY Rincon MD

## 2024-04-21 NOTE — PROGRESS NOTES
[facility]   part of [facility]     Hospitalist Progress Note     Giselle Weeks Patient Status:  Inpatient    1929 MRN [id number]   Location [facility] 8NE-A Attending Pamella Mccray MD   Hosp Day # 4 PCP Wicho Ward     Chief Complaint: palpitations    Subjective:     Patient is c/o feeling weak and tired    Objective:    Review of Systems:   A comprehensive review of systems was completed; pertinent positive and negatives stated in subjective.    Vital signs:  Temp:  [97.4 °F (36.3 °C)-99.3 °F (37.4 °C)] 98 °F (36.7 °C)  Pulse:  [] 80  Resp:  [16-24] 16  BP: ()/(49-87) 125/73  SpO2:  [95 %-99 %] 98 %    Physical Exam:    General: No acute distress  Respiratory: No wheezes, no rhonchi  Cardiovascular: S1, S2, regular rate and rhythm  Abdomen: Soft, Non-tender, non-distended, positive bowel sounds  Neuro: No new focal deficits.   Extremities: No edema      Diagnostic Data:    Labs:  Recent Labs   Lab 24  0242 24  0510 24  0447   WBC 5.7 6.3  --   --  8.3   HGB 11.6* 10.5*  --   --  10.0*   MCV 92.6 91.1  --   --  92.2   .0 185.0  185.0 175.0 157.0 174.0   INR 1.09  --   --   --   --        Recent Labs   Lab 245 24  1446 24  0510 24  0447   *   < > 148* 109* 115*   BUN 20   < > 25* 27* 30*   CREATSERUM 1.17*   < > 1.44* 1.84* 1.40*   CA 9.1   < > 9.4 8.7 8.8   ALB 3.4  --   --   --   --    *   < > 136 137 133*   K 4.1   < > 4.2 4.5 4.1      < > 108 107 103   CO2 19.0*   < > 22.0 22.0 24.0   ALKPHO 82  --   --   --   --    AST 21  --   --   --   --    ALT 19  --   --   --   --    BILT 0.3  --   --   --   --    TP 8.1  --   --   --   --     < > = values in this interval not displayed.       Estimated Creatinine Clearance: 19.4 mL/min (A) (based on SCr of 1.4 mg/dL (H)).    Recent Labs   Lab 24   TROPHS 11       Recent Labs   Lab 24   PTP  14.2   INR 1.09                    Microbiology    No results found for this visit on 04/16/24.      Imaging: Reviewed in Epic.    Medications:    metoprolol tartrate  100 mg Oral 2x Daily(Beta Blocker)    dilTIAZem  120 mg Oral Daily    apixaban  2.5 mg Oral BID    aspirin  81 mg Oral Daily    atorvastatin  10 mg Oral Nightly    [Held by provider] furosemide  40 mg Oral Daily       Assessment & Plan:      #Atrial fibrillation with RVR   On Lopressor and cardizem  #Essential hypertension    -Cardizem drip  -Heparin drip  -metoprolol  -TSH, ECHO  -Monitor hemodynamics  -Telemetry  -Cardiology consult     #Acute on chronic diastolic heart failure   -Lasix 20 IV  -Resume Lasix 20 PO     #Dyslipidemia  -Statin     #Hyperglycemia        Pamella Mccray MD    Supplementary Documentation:     Quality:  DVT Mechanical Prophylaxis:        DVT Pharmacologic Prophylaxis   Medication    apixaban (Eliquis) tab 2.5 mg         DVT Pharmacologic prophylaxis: Aspirin 81 mg      Code Status: Not on file  Quigley: External urinary catheter in place  Quigley Duration (in days):   Central line:    SHELLY: 4/20/2024    Discharge is dependent on: progress  At this point Ms. Weeks is expected to be discharge to: home    The 21st Century Cures Act makes medical notes like these available to patients in the interest of transparency. Please be advised this is a medical document. Medical documents are intended to carry relevant information, facts as evident, and the clinical opinion of the practitioner. The medical note is intended as peer to peer communication and may appear blunt or direct. It is written in medical language and may contain abbreviations or verbiage that are unfamiliar.             **Certification      PHYSICIAN Certification of Need for Inpatient Hospitalization - Initial Certification    Patient will require inpatient services that will reasonably be expected to span two midnight's based on the clinical documentation in H+P.   Based  on patients current state of illness, I anticipate that, after discharge, patient will require TBD.

## 2024-04-21 NOTE — PLAN OF CARE
Patient alert and oriented x 4, Talat speaking Family at bedside. Up  x1-2 with walker. On RA with adequate O2 saturations. A-fib on tele. Rates controlled, in the 80's-90's Continent of bowel, purewick in place. Reports of generalized pain, declines pain medication at this time. No complaints of shortness of breath or chest pain/discomfort. POC : Monitor HR, DW, discharge planning. Fall precautions in place. Call light within reach.    Problem: Patient/Family Goals  Goal: Patient/Family Long Term Goal  Description: Patient's Long Term Goal: stay out of hospital     Interventions:  - medication compliance  -follow up with primary care physician  - See additional Care Plan goals for specific interventions  Outcome: Progressing  Goal: Patient/Family Short Term Goal  Description: Patient's Short Term Goal: go home    Interventions:   - heparin gtt  - Cardizem gtt  -po Cardizem   - cardiology to see  - See additional Care Plan goals for specific interventions  Outcome: Progressing     Problem: CARDIOVASCULAR - ADULT  Goal: Maintains optimal cardiac output and hemodynamic stability  Description: INTERVENTIONS:  - Monitor vital signs, rhythm, and trends  - Monitor for bleeding, hypotension and signs of decreased cardiac output  - Evaluate effectiveness of vasoactive medications to optimize hemodynamic stability  - Monitor arterial and/or venous puncture sites for bleeding and/or hematoma  - Assess quality of pulses, skin color and temperature  - Assess for signs of decreased coronary artery perfusion - ex. Angina  - Evaluate fluid balance, assess for edema, trend weights  Outcome: Progressing  Goal: Absence of cardiac arrhythmias or at baseline  Description: INTERVENTIONS:  - Continuous cardiac monitoring, monitor vital signs, obtain 12 lead EKG if indicated  - Evaluate effectiveness of antiarrhythmic and heart rate control medications as ordered  - Initiate emergency measures for life threatening arrhythmias  - Monitor  electrolytes and administer replacement therapy as ordered  Outcome: Progressing     Problem: RESPIRATORY - ADULT  Goal: Achieves optimal ventilation and oxygenation  Description: INTERVENTIONS:  - Assess for changes in respiratory status  - Assess for changes in mentation and behavior  - Position to facilitate oxygenation and minimize respiratory effort  - Oxygen supplementation based on oxygen saturation or ABGs  - Provide Smoking Cessation handout, if applicable  - Encourage broncho-pulmonary hygiene including cough, deep breathe, Incentive Spirometry  - Assess the need for suctioning and perform as needed  - Assess and instruct to report SOB or any respiratory difficulty  - Respiratory Therapy support as indicated  - Manage/alleviate anxiety  - Monitor for signs/symptoms of CO2 retention  Outcome: Progressing     Problem: PAIN - ADULT  Goal: Verbalizes/displays adequate comfort level or patient's stated pain goal  Description: INTERVENTIONS:  - Encourage pt to monitor pain and request assistance  - Assess pain using appropriate pain scale  - Administer analgesics based on type and severity of pain and evaluate response  - Implement non-pharmacological measures as appropriate and evaluate response  - Consider cultural and social influences on pain and pain management  - Manage/alleviate anxiety  - Utilize distraction and/or relaxation techniques  - Monitor for opioid side effects  - Notify MD/LIP if interventions unsuccessful or patient reports new pain  - Anticipate increased pain with activity and pre-medicate as appropriate  Outcome: Progressing     Problem: RISK FOR INFECTION - ADULT  Goal: Absence of fever/infection during anticipated neutropenic period  Description: INTERVENTIONS  - Monitor WBC  - Administer growth factors as ordered  - Implement neutropenic guidelines  Outcome: Progressing     Problem: SAFETY ADULT - FALL  Goal: Free from fall injury  Description: INTERVENTIONS:  - Assess pt frequently for  physical needs  - Identify cognitive and physical deficits and behaviors that affect risk of falls.  - [place] fall precautions as indicated by assessment.  - Educate pt/family on patient safety including physical limitations  - Instruct pt to call for assistance with activity based on assessment  - Modify environment to reduce risk of injury  - Provide assistive devices as appropriate  - Consider OT/PT consult to assist with strengthening/mobility  - Encourage toileting schedule  Outcome: Progressing     Problem: DISCHARGE PLANNING  Goal: Discharge to home or other facility with appropriate resources  Description: INTERVENTIONS:  - Identify barriers to discharge w/pt and caregiver  - Include patient/family/discharge partner in discharge planning  - Arrange for needed discharge resources and transportation as appropriate  - Identify discharge learning needs (meds, wound care, etc)  - Arrange for interpreters to assist at discharge as needed  - Consider post-discharge preferences of patient/family/discharge partner  - Complete POLST form as appropriate  - Assess patient's ability to be responsible for managing their own health  - Refer to Case Management Department for coordinating discharge planning if the patient needs post-hospital services based on physician/LIP order or complex needs related to functional status, cognitive ability or social support system  Outcome: Progressing     Problem: Altered Communication/Language Barrier  Goal: Patient/Family is able to understand and participate in their care  Description: Interventions:  - Assess communication ability and preferred communication style  - Implement communication aides and strategies  - Use visual cues when possible  - Listen attentively, be patient, do not interrupt  - Minimize distractions  - Allow time for understanding and response  - Establish method for patient to ask for assistance (call light)  - Provide an  as needed  - Communicate  barriers and strategies to overcome with those who interact with patient  Outcome: Progressing

## 2024-04-21 NOTE — PLAN OF CARE
NURSING DISCHARGE NOTE    Discharged Home via Wheelchair.  Accompanied by Support staff  Belongings Taken by patient/family.    Discharge instruction given to pt's daughter in law verbalized understanding..

## 2024-04-21 NOTE — PLAN OF CARE
Ambulated in the halls per PT and up in chair this am,pt tolerated fine  Daughter in law at bs,she interprets Talat language  Pt awake,alert   Afib HR better 80's-90's  On po Metoprolol and po Diltiazem  C/o shoulder pain, Tylenol given and helping  Discharge today.      Problem: CARDIOVASCULAR - ADULT  Goal: Maintains optimal cardiac output and hemodynamic stability  Description: INTERVENTIONS:  - Monitor vital signs, rhythm, and trends  - Monitor for bleeding, hypotension and signs of decreased cardiac output  - Evaluate effectiveness of vasoactive medications to optimize hemodynamic stability  - Monitor arterial and/or venous puncture sites for bleeding and/or hematoma  - Assess quality of pulses, skin color and temperature  - Assess for signs of decreased coronary artery perfusion - ex. Angina  - Evaluate fluid balance, assess for edema, trend weights  Outcome: Progressing  Goal: Absence of cardiac arrhythmias or at baseline  Description: INTERVENTIONS:  - Continuous cardiac monitoring, monitor vital signs, obtain 12 lead EKG if indicated  - Evaluate effectiveness of antiarrhythmic and heart rate control medications as ordered  - Initiate emergency measures for life threatening arrhythmias  - Monitor electrolytes and administer replacement therapy as ordered  Outcome: Progressing     Problem: RESPIRATORY - ADULT  Goal: Achieves optimal ventilation and oxygenation  Description: INTERVENTIONS:  - Assess for changes in respiratory status  - Assess for changes in mentation and behavior  - Position to facilitate oxygenation and minimize respiratory effort  - Oxygen supplementation based on oxygen saturation or ABGs  - Provide Smoking Cessation handout, if applicable  - Encourage broncho-pulmonary hygiene including cough, deep breathe, Incentive Spirometry  - Assess the need for suctioning and perform as needed  - Assess and instruct to report SOB or any respiratory difficulty  - Respiratory Therapy support as  indicated  - Manage/alleviate anxiety  - Monitor for signs/symptoms of CO2 retention  Outcome: Progressing     Problem: PAIN - ADULT  Goal: Verbalizes/displays adequate comfort level or patient's stated pain goal  Description: INTERVENTIONS:  - Encourage pt to monitor pain and request assistance  - Assess pain using appropriate pain scale  - Administer analgesics based on type and severity of pain and evaluate response  - Implement non-pharmacological measures as appropriate and evaluate response  - Consider cultural and social influences on pain and pain management  - Manage/alleviate anxiety  - Utilize distraction and/or relaxation techniques  - Monitor for opioid side effects  - Notify MD/LIP if interventions unsuccessful or patient reports new pain  - Anticipate increased pain with activity and pre-medicate as appropriate  Outcome: Progressing     Problem: Altered Communication/Language Barrier  Goal: Patient/Family is able to understand and participate in their care  Description: Interventions:  - Assess communication ability and preferred communication style  - Implement communication aides and strategies  - Use visual cues when possible  - Listen attentively, be patient, do not interrupt  - Minimize distractions  - Allow time for understanding and response  - Establish method for patient to ask for assistance (call light)  - Provide an  as needed  - Communicate barriers and strategies to overcome with those who interact with patient  Outcome: Progressing

## 2024-04-25 NOTE — DISCHARGE SUMMARY
[facility]     Giselle Weeks Patient Status:  Inpatient    1929 MRN [id number]   Location [facility] 8NE-A Attending Pamella Mccray MD   Hosp Day # 4 PCP Wicho Ward     Date of Admission: 2024  Date of Discharge:   2024    Discharge Disposition: Home or Self Care    Discharge Diagnosis:  #Atrial fibrillation with RVR   #Essential hypertension     -Cardizem drip  -Heparin drip  -metoprolol  -TSH, ECHO  -Monitor hemodynamics  -Telemetry  -Cardiology consult     #Acute on chronic diastolic heart failure   -Lasix 20 IV  -Resume Lasix 20 PO     #Dyslipidemia  -Statin     #Hyperglycemia       History of Present Illness: 89 years old female who speaks mainly or DuoNebs at home with her family presents with shortness of breath.    Brief Synopsis: Patient has history hypertension hyperlipidemia anemia chronic diastolic CHF and family provided translation.  She was diagnosed with A-fib RVR and started on Cardizem drip and heparin drip with plans to transition to Eliquis.  Patient was monitored on cardiac telemetry floor and she was seen by cardiology.  High proBNP was 5950 and chest x-ray showed evidence of congestion.  Patient improved with Lasix IV.  Plan is to discharge her on metoprolol Eliquis and Lasix.    Lace+ Score: 73  59-90 High Risk  29-58 Medium Risk  0-28   Low Risk       TCM Follow-Up Recommendation:  LACE > 58: High Risk of readmission after discharge from the hospital.      Procedures during hospitalization:       Incidental or significant findings and recommendations (brief descriptions):      Lab/Test resultspending at Discharge:       Consultants:  cardiology    Discharge Medication List:     Discharge Medications        START taking these medications        Instructions Prescription details   apixaban 5 MG Tabs  Commonly known as: Eliquis      Take 0.5 tablets (2.5 mg total) by mouth 2 (two) times daily.   Quantity: 60 tablet  Refills: 0      atorvastatin 10 MG Tabs  Commonly known as: Lipitor  Replaces: simvastatin 20 MG Tabs      Take 1 tablet (10 mg total) by mouth nightly.   Quantity: 30 tablet  Refills: 0     dilTIAZem HCl 120 MG Tabs  Commonly known as: CARDIZEM      Take 1 tablet (120 mg total) by mouth daily.   Quantity: 30 tablet  Refills: 3     potassium chloride 10 MEQ Tbcr  Commonly known as: Klor-Con M10      Take 10 mEq by mouth.   Refills: 0            CHANGE how you take these medications        Instructions Prescription details   metoprolol tartrate 100 MG Tabs  Commonly known as: Lopressor  What changed:   medication strength  how much to take  when to take this      Take 1 tablet (100 mg total) by mouth 2x Daily(Beta Blocker).   Quantity: 60 tablet  Refills: 3            CONTINUE taking these medications        Instructions Prescription details   albuterol (2.5 MG/3ML) 0.083% Nebu  Commonly known as: Ventolin      Take by nebulization every 6 (six) hours as needed for Wheezing.   Refills: 0     aspirin 81 MG Tbec      Take 1 tablet (81 mg total) by mouth.   Refills: 0     furosemide 20 MG Tabs  Commonly known as: Lasix      Take 1 tablet (20 mg total) by mouth daily.   Quantity: 30 tablet  Refills: 0     ipratropium 0.06 % Soln  Commonly known as: Atrovent      2 sprays by Nasal route 4 (four) times daily.   Quantity: 1 Bottle  Refills: 11     traMADol 50 MG Tabs  Commonly known as: Ultram      Take 1 tablet (50 mg total) by mouth. Takes as needed   Refills: 0            STOP taking these medications      carvedilol 3.125 MG Tabs  Commonly known as: Coreg        simvastatin 20 MG Tabs  Commonly known as: Zocor  Replaced by: atorvastatin 10 MG Tabs                  Where to Get Your Medications        These medications were sent to [place], [place] - [address] [place] [address], [address] [phone], [phone]  [address] [place] [address], [address] [address]      Phone: [phone]   apixaban 5 MG Tabs       These  medications were sent to [id number] - [place] - [address] [phone], [phone]  [address], [place] [address]      Phone: [phone]   dilTIAZem HCl 120 MG Tabs  furosemide 20 MG Tabs  metoprolol tartrate 100 MG Tabs       Please  your prescriptions at the location directed by your doctor or nurse    Bring a paper prescription for each of these medications  atorvastatin 10 MG Tabs         [facility] reviewed:     Follow-up appointment:   Radha Oconnell MD  [address] [address] [phone]    Follow up in 2 week(s)      Appointments for Next 30 Days 2024 - 2024      None            Vital signs:       Physical Exam:    General: No acute distress   Lungs: clear to auscultation  Cardiovascular: S1, S2  Abdomen: Soft      -----------------------------------------------------------------------------------------------  PATIENT DISCHARGE INSTRUCTIONS: See electronic chart    Pamella Mccray MD    Total time spent on discharge plannin minutes     The  [place] Cures Act makes medical notes like these available to patients in the interest of transparency. Please be advised this is a medical document. Medical documents are intended to carry relevant information, facts as evident, and the clinical opinion of the practitioner. The medical note is intended as peer to peer communication and may appear blunt or direct. It is written in medical language and may contain abbreviations or verbiage that are unfamiliar.

## 2024-04-30 ENCOUNTER — APPOINTMENT (OUTPATIENT)
Dept: GENERAL RADIOLOGY | Age: 89
End: 2024-04-30
Attending: EMERGENCY MEDICINE
Payer: MEDICARE

## 2024-04-30 ENCOUNTER — HOSPITAL ENCOUNTER (EMERGENCY)
Age: 89
Discharge: HOME | End: 2024-05-01
Attending: EMERGENCY MEDICINE
Payer: MEDICARE

## 2024-04-30 DIAGNOSIS — K59.00 CONSTIPATION, UNSPECIFIED CONSTIPATION TYPE: Primary | ICD-10-CM

## 2024-04-30 PROCEDURE — 74019 RADEX ABDOMEN 2 VIEWS: CPT | Performed by: EMERGENCY MEDICINE

## 2024-04-30 PROCEDURE — 99283 EMERGENCY DEPT VISIT LOW MDM: CPT

## 2024-05-01 VITALS
OXYGEN SATURATION: 100 % | SYSTOLIC BLOOD PRESSURE: 114 MMHG | HEART RATE: 84 BPM | RESPIRATION RATE: 16 BRPM | DIASTOLIC BLOOD PRESSURE: 67 MMHG

## 2024-05-01 NOTE — DISCHARGE INSTRUCTIONS
Recommend high-fiber diet    Continue MiraLAX daily    May take magnesium citrate at home as needed

## 2024-05-01 NOTE — ED PROVIDER NOTES
Patient Seen in: [facility] [place]      History     Chief Complaint   Patient presents with    Constipation     Stated Complaint: right lower quadrant abd pain. constipation. No BM for over 5 days.    Subjective:   HPI    Patient is a 89-year-old female presents emergency room with daughter with complaint of constipation.  Daughter provides history as patient is formed language speaking and patient has not had a bowel movement for 7 days.  Daughter tried suppository at home and patient has been on MiraLAX.  Mild abdominal pain.  No vomiting.  History of hysterectomy in the past.  Daughter states patient feels stool in the rectum    Objective:   Past Medical History:    Congestive heart disease (HCC)              Past Surgical History:   Procedure Laterality Date    Breast surgery      Total abdom hysterectomy                  Social History     Socioeconomic History    Marital status: Single   Tobacco Use    Smoking status: Never    Smokeless tobacco: Never   Substance and Sexual Activity    Alcohol use: Never     Social Determinants of Health     Food Insecurity: No Food Insecurity (4/17/2024)    Food Insecurity     Food Insecurity: Never true   Transportation Needs: No Transportation Needs (4/17/2024)    Transportation Needs     Lack of Transportation: No   Housing Stability: Low Risk  (4/17/2024)    Housing Stability     Housing Instability: No              Review of Systems    Positive for stated complaint: right lower quadrant abd pain. constipation. No BM for over 5 days.  Other systems are as noted in HPI.  Constitutional and vital signs reviewed.      All other systems reviewed and negative except as noted above.    Physical Exam     ED Triage Vitals [05/01/24 0128]   /67   Pulse 84   Resp 16   Temp    Temp src    SpO2 100 %   O2 Device None (Room air)       Current:/67   Pulse 84   Resp 16   SpO2 100%         Physical Exam    CONSTITUTIONAL: Well appearing, in no  acute distress  LUNGS: Clear to auscultation bilaterally  CARDIOVASCULAR: Regular rate and rhythm, normal S1-S2  ABDOMINAL: Soft, mild diffuse abdominal tenderness but no rebound, guarding or rigidity  SKIN: Warm and dry    ED Course   Labs Reviewed - No data to display          I personally reviewed xray films of abdomen and pelvis and independent interpretation shows no obvious obstruction.  Large amount of stool in the rectum.  I also reviewed formal xray report as read by radiology with findings below:    X-ray read by vision and radiology is negative for obstructive process.  Large rectal stool mass.  Moderate stool in the more proximal colon.       MDM      Patient is a 89-year-old female presents to emergency room for evaluation of constipation.  Differential clues bowel obstruction, constipation.  X-ray obtained showing large rectal stool mass consistent with distal fecal impaction.  Patient given soapsuds enema with resultant large bowel movement.  Patient feeling better.  Denies abdominal pain.  Abdomen reexamined and nontender.  Recommend daily MiraLAX.  Magnesium citrate given for home as needed.    Patient was screened and evaluated during this visit.   As a treating physician attending to the patient, I determined, within reasonable clinical confidence and prior to discharge, that an emergency medical condition was not or was no longer present.  There was no indication for further evaluation, treatment or admission on an emergency basis.  Comprehensive verbal and written discharge and follow-up instructions were provided to help prevent relapse or worsening.  Patient was instructed to follow-up with her primary care provider for further evaluation and treatment, but to return immediately to the ER for worsening, concerning, new, changing or persisting symptoms.  I discussed the case with the patient and they had no questions, complaints, or concerns.  Patient felt comfortable going home.                  MDM    Disposition and Plan     Clinical Impression:  1. Constipation, unspecified constipation type         Disposition:  Discharge  5/1/2024  1:34 am    Follow-up:  Wicho Ward  [address] [place] [address] [place] [id number] [phone]    Follow up  As needed          Medications Prescribed:  Discharge Medication List as of 5/1/2024  1:35 AM        START taking these medications    Details   Magnesium Citrate Oral Solution Take 296 mL by mouth once for 1 dose., Normal, Disp-296 mL, R-0

## 2024-05-01 NOTE — ED INITIAL ASSESSMENT (HPI)
Patient presents with constipation. Family states patient is experiencing right lower quadrant abd pain and has not had a BM for over 5 days.

## 2024-05-13 NOTE — ED QUICK NOTES
MD at bedside.   Please let me know what pharmacy to send this to at first that said [facility] next to us and then said [facility] but did not identify which [facility].

## 2024-05-29 ENCOUNTER — HOSPITAL ENCOUNTER (OUTPATIENT)
Dept: LAB | Facility: HOSPITAL | Age: 89
Discharge: HOME | End: 2024-05-29
Attending: INTERNAL MEDICINE
Payer: MEDICARE

## 2024-05-29 DIAGNOSIS — I48.91 ATRIAL FIBRILLATION WITH RAPID VENTRICULAR RESPONSE (HCC): ICD-10-CM

## 2024-05-29 LAB
INR BLD: 2.49 (ref 0.8–1.2)
PROTHROMBIN TIME: 27.2 SECONDS (ref 11.6–14.8)

## 2024-05-29 PROCEDURE — 85610 PROTHROMBIN TIME: CPT | Performed by: INTERNAL MEDICINE

## 2024-05-29 PROCEDURE — 36415 COLL VENOUS BLD VENIPUNCTURE: CPT | Performed by: INTERNAL MEDICINE

## 2024-06-04 ENCOUNTER — LAB ENCOUNTER (OUTPATIENT)
Dept: LAB | Age: 89
End: 2024-06-04
Attending: INTERNAL MEDICINE
Payer: MEDICARE

## 2024-06-04 DIAGNOSIS — I48.91 ATRIAL FIBRILLATION WITH RAPID VENTRICULAR RESPONSE (HCC): ICD-10-CM

## 2024-06-04 LAB
INR BLD: 3.73 (ref 0.8–1.2)
PROTHROMBIN TIME: 37.5 SECONDS (ref 11.6–14.8)

## 2024-06-04 PROCEDURE — 36415 COLL VENOUS BLD VENIPUNCTURE: CPT

## 2024-06-04 PROCEDURE — 85610 PROTHROMBIN TIME: CPT

## 2024-06-05 ENCOUNTER — APPOINTMENT (OUTPATIENT)
Dept: GENERAL RADIOLOGY | Facility: HOSPITAL | Age: 89
End: 2024-06-05
Attending: EMERGENCY MEDICINE
Payer: MEDICARE

## 2024-06-05 ENCOUNTER — HOSPITAL ENCOUNTER (EMERGENCY)
Facility: HOSPITAL | Age: 89
Discharge: HOME | End: 2024-06-05
Attending: EMERGENCY MEDICINE
Payer: MEDICARE

## 2024-06-05 VITALS
DIASTOLIC BLOOD PRESSURE: 61 MMHG | OXYGEN SATURATION: 98 % | BODY MASS INDEX: 26.43 KG/M2 | RESPIRATION RATE: 24 BRPM | HEART RATE: 87 BPM | WEIGHT: 140 LBS | SYSTOLIC BLOOD PRESSURE: 124 MMHG | HEIGHT: 61 IN | TEMPERATURE: 97 F

## 2024-06-05 DIAGNOSIS — R06.02 SHORTNESS OF BREATH: Primary | ICD-10-CM

## 2024-06-05 LAB
ALBUMIN SERPL-MCNC: 3.4 G/DL (ref 3.4–5)
ALBUMIN/GLOB SERPL: 0.8 {RATIO} (ref 1–2)
ALP LIVER SERPL-CCNC: 82 U/L
ALT SERPL-CCNC: 14 U/L
ANION GAP SERPL CALC-SCNC: 6 MMOL/L (ref 0–18)
AST SERPL-CCNC: 14 U/L (ref 15–37)
BASOPHILS # BLD AUTO: 0.03 X10(3) UL (ref 0–0.2)
BASOPHILS NFR BLD AUTO: 0.6 %
BILIRUB SERPL-MCNC: 0.6 MG/DL (ref 0.1–2)
BILIRUB UR QL STRIP.AUTO: NEGATIVE
BUN BLD-MCNC: 17 MG/DL (ref 9–23)
CALCIUM BLD-MCNC: 9 MG/DL (ref 8.5–10.1)
CHLORIDE SERPL-SCNC: 105 MMOL/L (ref 98–112)
CLARITY UR REFRACT.AUTO: CLEAR
CO2 SERPL-SCNC: 26 MMOL/L (ref 21–32)
CREAT BLD-MCNC: 1.4 MG/DL
EGFRCR SERPLBLD CKD-EPI 2021: 35 ML/MIN/1.73M2 (ref 60–?)
EOSINOPHIL # BLD AUTO: 0.21 X10(3) UL (ref 0–0.7)
EOSINOPHIL NFR BLD AUTO: 4.2 %
ERYTHROCYTE [DISTWIDTH] IN BLOOD BY AUTOMATED COUNT: 13.7 %
GLOBULIN PLAS-MCNC: 4.2 G/DL (ref 2.8–4.4)
GLUCOSE BLD-MCNC: 120 MG/DL (ref 70–99)
GLUCOSE UR STRIP.AUTO-MCNC: NORMAL MG/DL
HCT VFR BLD AUTO: 32.7 %
HGB BLD-MCNC: 11 G/DL
IMM GRANULOCYTES # BLD AUTO: 0.06 X10(3) UL (ref 0–1)
IMM GRANULOCYTES NFR BLD: 1.2 %
KETONES UR STRIP.AUTO-MCNC: NEGATIVE MG/DL
LEUKOCYTE ESTERASE UR QL STRIP.AUTO: NEGATIVE
LYMPHOCYTES # BLD AUTO: 2.29 X10(3) UL (ref 1–4)
LYMPHOCYTES NFR BLD AUTO: 46.3 %
MCH RBC QN AUTO: 30.7 PG (ref 26–34)
MCHC RBC AUTO-ENTMCNC: 33.6 G/DL (ref 31–37)
MCV RBC AUTO: 91.3 FL
MONOCYTES # BLD AUTO: 0.38 X10(3) UL (ref 0.1–1)
MONOCYTES NFR BLD AUTO: 7.7 %
NEUTROPHILS # BLD AUTO: 1.98 X10 (3) UL (ref 1.5–7.7)
NEUTROPHILS # BLD AUTO: 1.98 X10(3) UL (ref 1.5–7.7)
NEUTROPHILS NFR BLD AUTO: 40 %
NITRITE UR QL STRIP.AUTO: NEGATIVE
NT-PROBNP SERPL-MCNC: 3537 PG/ML (ref ?–450)
OSMOLALITY SERPL CALC.SUM OF ELEC: 287 MOSM/KG (ref 275–295)
PH UR STRIP.AUTO: 6.5 [PH] (ref 5–8)
PLATELET # BLD AUTO: 189 10(3)UL (ref 150–450)
POTASSIUM SERPL-SCNC: 3.5 MMOL/L (ref 3.5–5.1)
PROT SERPL-MCNC: 7.6 G/DL (ref 6.4–8.2)
PROT UR STRIP.AUTO-MCNC: NEGATIVE MG/DL
Q-T INTERVAL: 322 MS
QRS DURATION: 86 MS
QTC CALCULATION (BEZET): 387 MS
R AXIS: 50 DEGREES
RBC # BLD AUTO: 3.58 X10(6)UL
RBC UR QL AUTO: NEGATIVE
SODIUM SERPL-SCNC: 137 MMOL/L (ref 136–145)
SP GR UR STRIP.AUTO: 1.01 (ref 1–1.03)
T AXIS: 255 DEGREES
TROPONIN I SERPL HS-MCNC: 12 NG/L
UROBILINOGEN UR STRIP.AUTO-MCNC: NORMAL MG/DL
VENTRICULAR RATE: 87 BPM
WBC # BLD AUTO: 5 X10(3) UL (ref 4–11)

## 2024-06-05 PROCEDURE — 99285 EMERGENCY DEPT VISIT HI MDM: CPT

## 2024-06-05 PROCEDURE — 96374 THER/PROPH/DIAG INJ IV PUSH: CPT

## 2024-06-05 PROCEDURE — 80053 COMPREHEN METABOLIC PANEL: CPT | Performed by: EMERGENCY MEDICINE

## 2024-06-05 PROCEDURE — 71045 X-RAY EXAM CHEST 1 VIEW: CPT | Performed by: EMERGENCY MEDICINE

## 2024-06-05 PROCEDURE — 83880 ASSAY OF NATRIURETIC PEPTIDE: CPT | Performed by: EMERGENCY MEDICINE

## 2024-06-05 PROCEDURE — 85025 COMPLETE CBC W/AUTO DIFF WBC: CPT | Performed by: EMERGENCY MEDICINE

## 2024-06-05 PROCEDURE — 93005 ELECTROCARDIOGRAM TRACING: CPT

## 2024-06-05 PROCEDURE — 81003 URINALYSIS AUTO W/O SCOPE: CPT | Performed by: EMERGENCY MEDICINE

## 2024-06-05 PROCEDURE — 93010 ELECTROCARDIOGRAM REPORT: CPT

## 2024-06-05 PROCEDURE — 84484 ASSAY OF TROPONIN QUANT: CPT | Performed by: EMERGENCY MEDICINE

## 2024-06-05 RX ORDER — FUROSEMIDE 10 MG/ML
40 INJECTION INTRAMUSCULAR; INTRAVENOUS ONCE
Status: COMPLETED | OUTPATIENT
Start: 2024-06-05 | End: 2024-06-05

## 2024-06-05 NOTE — ED QUICK NOTES
Pt's daughter concerned about discharge, reporting her mother is still feeling sob.     Daughter made aware will administer lasix and monitor for improvement prior to decision to admit.

## 2024-06-05 NOTE — ED QUICK NOTES
Pt road-tested with walker per pt baseline. Patient tolerated well and reported no SOB. MD notified

## 2024-06-05 NOTE — ED INITIAL ASSESSMENT (HPI)
Daughter reports patient with SOB since last night. Denies chest pain. Denies cough or recent travel. Denies any increase in leg swelling from normal. Hx of A Fib on Eliquis.

## 2024-06-05 NOTE — ED QUICK NOTES
Pt lying on cart appears comfortable, skin w/d,resps reg/shallow. Pt does appear comfortable however daughter reports pt does not feel much better.     Daughter requesting urinalysis be checked but pt with no urine symptoms. Urine obtained from suction canister. Urine clear and yellow in nature.

## 2024-06-05 NOTE — ED QUICK NOTES
Pt sitting upright on cart, drinking coffee.     Pt awake and alert, skin w/d,resps reg/shallow. Pt appears comfortable. Pt assisted with dressing and pure wick removed.     Pt to be discharged shortly.

## 2024-06-05 NOTE — ED PROVIDER NOTES
Patient Seen in: [facility] Emergency Department      History     Chief Complaint   Patient presents with    Difficulty Breathing     98% in TL     Stated Complaint: DWIGHT    Subjective:   HPI    Patient is a 89-year-old female with a history of recently diagnosed atrial fibrillation on Eliquis, CHF presenting for increased shortness of breath overnight.  Daughter is at bedside to help with history.  Daughter states the patient always complains of some shortness of breath but it seemed worse last night and she did not sleep well.  Used her nebulizer treatment a couple times but it did not seem to help.  Has not had a productive cough or any fevers.  No chest pain or tightness.    Objective:   Past Medical History:    Atrial fibrillation (HCC)    Cancer (HCC)    Congestive heart disease (HCC)    Essential hypertension              Past Surgical History:   Procedure Laterality Date    Breast surgery      Total abdom hysterectomy                  Social History     Socioeconomic History    Marital status: Single   Tobacco Use    Smoking status: Never    Smokeless tobacco: Never   Substance and Sexual Activity    Alcohol use: Never     Social Determinants of Health     Food Insecurity: No Food Insecurity (4/17/2024)    Food Insecurity     Food Insecurity: Never true   Transportation Needs: No Transportation Needs (4/17/2024)    Transportation Needs     Lack of Transportation: No   Housing Stability: Low Risk  (4/17/2024)    Housing Stability     Housing Instability: No              Review of Systems    Positive for stated complaint: DWIGHT  Other systems are as noted in HPI.  Constitutional and vital signs reviewed.      All other systems reviewed and negative except as noted above.    Physical Exam     ED Triage Vitals [06/05/24 0734]   /83   Pulse 91   Resp 22   Temp 96.9 °F (36.1 °C)   Temp src Temporal   SpO2 97 %   O2 Device None (Room air)       Current Vitals:   Vital Signs  BP: 124/61  Pulse: 79  Resp:  24  Temp: 96.9 °F (36.1 °C)  Temp src: Temporal  MAP (mmHg): 77    Oxygen Therapy  SpO2: 98 %  O2 Device: None (Room air)            Physical Exam  Vitals and nursing note reviewed.   Constitutional:       Appearance: She is well-developed.   HENT:      Head: Normocephalic and atraumatic.   Eyes:      Conjunctiva/sclera: Conjunctivae normal.      Pupils: Pupils are equal, round, and reactive to light.   Cardiovascular:      Rate and Rhythm: Normal rate. Rhythm irregular.      Heart sounds: Normal heart sounds.   Pulmonary:      Effort: Pulmonary effort is normal.      Breath sounds: Normal breath sounds.      Comments: No tachypnea, no retractions, no distress.  Lungs are clear to auscultation bilaterally with good aeration.  Abdominal:      General: Bowel sounds are normal.      Palpations: Abdomen is soft.   Musculoskeletal:         General: Normal range of motion.      Cervical back: Normal range of motion and neck supple.      Comments: Trace to 1+ edema bilateral lower extremities.  Better than usual per patient's daughter at bedside.   Skin:     General: Skin is warm and dry.   Neurological:      Mental Status: She is alert and oriented to person, place, and time.               ED Course     Labs Reviewed   COMP METABOLIC PANEL (14) - Abnormal; Notable for the following components:       Result Value    Glucose 120 (*)     Creatinine 1.40 (*)     eGFR-Cr 35 (*)     AST 14 (*)     A/G Ratio 0.8 (*)     All other components within normal limits   PRO BETA NATRIURETIC PEPTIDE - Abnormal; Notable for the following components:    Pro-Beta Natriuretic Peptide 3,537 (*)     All other components within normal limits   CBC W/ DIFFERENTIAL - Abnormal; Notable for the following components:    RBC 3.58 (*)     HGB 11.0 (*)     HCT 32.7 (*)     All other components within normal limits   TROPONIN I HIGH SENSITIVITY - Normal   CBC WITH DIFFERENTIAL WITH PLATELET    Narrative:     The following orders were created for panel  order CBC With Differential With Platelet.  Procedure                               Abnormality         Status                     ---------                               -----------         ------                     CBC W/ DIFFERENTIAL[[id number]]          Abnormal            Final result                 Please view results for these tests on the individual orders.   URINALYSIS, ROUTINE   RAINBOW DRAW LAVENDER   RAINBOW DRAW LIGHT GREEN   RAINBOW DRAW BLUE     EKG    Rate, intervals and axes as noted on EKG Report.  Rate: 87  Rhythm: Atrial Fibrillation  Reading: Atrial fibrillation with controlled ventricular response.  No ST segment or T wave changes.  Rate is decreased from prior EKG dated 4/16/2024, otherwise no changes.                 XR CHEST AP PORTABLE  (CPT=71045)    Result Date: 6/5/2024  PROCEDURE:  XR CHEST AP PORTABLE  (CPT=71045)  TECHNIQUE:  AP chest radiograph was obtained.  COMPARISON:  MARITA , EDWIN, XR CHEST AP PORTABLE  (CPT=71045), 4/16/2024, 8:47 PM.  INDICATIONS:  DWIGHT  PATIENT STATED HISTORY: (As transcribed by Technologist)  Patient's familt states the patient is having difficulty breathing.    FINDINGS:  Cardiomegaly may be due to portable technique.  Interstitial prominence.  Small bilateral pleural effusions, left greater than right, underlying atelectasis/consolidation cannot be excluded.  No pneumothorax.  Right axillary surgical clips.            CONCLUSION:  1. Findings suggest edema, correlate clinically with congestive heart failure.   LOCATION:  [facility]      Dictated by (CST): January Saeed MD on 6/05/2024 at 9:22 AM     Finalized by (CST): January Saeed MD on 6/05/2024 at 9:23 AM               MDM      89-year-old female presenting with increased shortness of breath overnight.  Daughter states she always has some baseline shortness of breath but was worse overnight and she did not sleep well.  On arrival here she is awake and alert, well-appearing, no distress.  Differential includes  pneumonia, ACS, volume overload.  Labs, chest x-ray ordered.      Update at 10:10 AM.  Mildly elevated BNP although troponin is negative.  Chest x-ray demonstrates some interstitial prominence, possible volume overload.  Small effusions.  Did a walking pulse ox here and he remained 97% patient appears comfortable.  Daughter agrees she looks about the same as usual but while discussing discharge home she is concerned about taking her home with the shortness of breath that she has been having and the fact that it was worse overnight.  She has not taken her morning medications yet so we will give her a dose of Lasix 40 mg of IV and evaluate her here after she is diuresed a bit.  I think if she is feeling improved after that she can be discharged.      Update at 12:15 PM.  Patient is diuresed after Lasix and is feeling better.  She is comfortable going home.  We did other ambulation trial.      Past Medical History-atrial fibrillation, hypertension, CHF    Differential diagnosis before testing included volume overload, pneumonia, ACS    Co-morbidities that add to the complexity of management include: None    Testing ordered during this visit included labs, chest x-ray    Radiographic images  I personally reviewed the radiographs and my individual interpretation shows mild vascular congestion, no pneumothorax  I also reviewed the official reports that showed mild vascular congestion, no pneumothorax      History obtained by an independent source included from daughter at bedside        Medications Provided: Lasix            Disposition:          Discharge  I have discussed with the patient the results of test, differential diagnosis, treatment plan, warning signs and symptoms which should prompt immediate return.  They expressed understanding of these instructions and agrees to the following plan provided.  They were given written discharge instructions and agrees to return for any concerns and voiced understanding and  all questions were answered.                           Medical Decision Making      Disposition and Plan     Clinical Impression:  1. Shortness of breath         Disposition:  Discharge  6/5/2024 12:16 pm    Follow-up:  Wicho Ward   [address]  [place] [address] [place] [id number] [phone]    Follow up            Medications Prescribed:  Current Discharge Medication List

## 2024-06-10 ENCOUNTER — HOSPITAL ENCOUNTER (OUTPATIENT)
Dept: LAB | Facility: HOSPITAL | Age: 89
Discharge: HOME | End: 2024-06-10
Attending: INTERNAL MEDICINE
Payer: MEDICARE

## 2024-06-10 DIAGNOSIS — I48.91 ATRIAL FIBRILLATION WITH RAPID VENTRICULAR RESPONSE (HCC): ICD-10-CM

## 2024-06-10 LAB
INR BLD: 1.74 (ref 0.8–1.2)
PROTHROMBIN TIME: 20.5 SECONDS (ref 11.6–14.8)

## 2024-06-10 PROCEDURE — 85610 PROTHROMBIN TIME: CPT | Performed by: INTERNAL MEDICINE

## 2024-06-10 PROCEDURE — 36415 COLL VENOUS BLD VENIPUNCTURE: CPT | Performed by: INTERNAL MEDICINE

## 2024-06-21 ENCOUNTER — HOSPITAL ENCOUNTER (EMERGENCY)
Facility: HOSPITAL | Age: 89
Discharge: HOME | End: 2024-06-21
Attending: EMERGENCY MEDICINE

## 2024-06-21 ENCOUNTER — APPOINTMENT (OUTPATIENT)
Dept: GENERAL RADIOLOGY | Facility: HOSPITAL | Age: 89
End: 2024-06-21

## 2024-06-21 VITALS
RESPIRATION RATE: 15 BRPM | TEMPERATURE: 98 F | DIASTOLIC BLOOD PRESSURE: 81 MMHG | BODY MASS INDEX: 26 KG/M2 | OXYGEN SATURATION: 96 % | WEIGHT: 140 LBS | SYSTOLIC BLOOD PRESSURE: 128 MMHG | HEART RATE: 65 BPM

## 2024-06-21 DIAGNOSIS — J18.9 COMMUNITY ACQUIRED PNEUMONIA OF RIGHT MIDDLE LOBE OF LUNG: Primary | ICD-10-CM

## 2024-06-21 DIAGNOSIS — F43.21 SITUATIONAL DEPRESSION: ICD-10-CM

## 2024-06-21 DIAGNOSIS — R79.1 ELEVATED INR: ICD-10-CM

## 2024-06-21 DIAGNOSIS — I50.9 ACUTE ON CHRONIC CONGESTIVE HEART FAILURE, UNSPECIFIED HEART FAILURE TYPE (HCC): ICD-10-CM

## 2024-06-21 LAB
ALBUMIN SERPL-MCNC: 3.4 G/DL (ref 3.4–5)
ALBUMIN/GLOB SERPL: 0.9 {RATIO} (ref 1–2)
ALP LIVER SERPL-CCNC: 80 U/L
ALT SERPL-CCNC: 19 U/L
ANION GAP SERPL CALC-SCNC: 10 MMOL/L (ref 0–18)
APTT PPP: 44.1 SECONDS (ref 23–36)
AST SERPL-CCNC: 25 U/L (ref 15–37)
BASOPHILS # BLD AUTO: 0.02 X10(3) UL (ref 0–0.2)
BASOPHILS NFR BLD AUTO: 0.4 %
BILIRUB SERPL-MCNC: 0.9 MG/DL (ref 0.1–2)
BILIRUB UR QL STRIP.AUTO: NEGATIVE
BUN BLD-MCNC: 13 MG/DL (ref 9–23)
CALCIUM BLD-MCNC: 8.8 MG/DL (ref 8.5–10.1)
CHLORIDE SERPL-SCNC: 98 MMOL/L (ref 98–112)
CLARITY UR REFRACT.AUTO: CLEAR
CO2 SERPL-SCNC: 24 MMOL/L (ref 21–32)
COLOR UR AUTO: COLORLESS
CREAT BLD-MCNC: 1.2 MG/DL
EGFRCR SERPLBLD CKD-EPI 2021: 42 ML/MIN/1.73M2 (ref 60–?)
EOSINOPHIL # BLD AUTO: 0.14 X10(3) UL (ref 0–0.7)
EOSINOPHIL NFR BLD AUTO: 2.9 %
ERYTHROCYTE [DISTWIDTH] IN BLOOD BY AUTOMATED COUNT: 14 %
GLOBULIN PLAS-MCNC: 4 G/DL (ref 2.8–4.4)
GLUCOSE BLD-MCNC: 114 MG/DL (ref 70–99)
GLUCOSE UR STRIP.AUTO-MCNC: NORMAL MG/DL
HCT VFR BLD AUTO: 33.1 %
HGB BLD-MCNC: 11 G/DL
IMM GRANULOCYTES # BLD AUTO: 0.03 X10(3) UL (ref 0–1)
IMM GRANULOCYTES NFR BLD: 0.6 %
INR BLD: 4.46 (ref 0.8–1.2)
KETONES UR STRIP.AUTO-MCNC: NEGATIVE MG/DL
LACTATE SERPL-SCNC: 1.4 MMOL/L (ref 0.4–2)
LEUKOCYTE ESTERASE UR QL STRIP.AUTO: NEGATIVE
LYMPHOCYTES # BLD AUTO: 1.78 X10(3) UL (ref 1–4)
LYMPHOCYTES NFR BLD AUTO: 36.6 %
MCH RBC QN AUTO: 30.3 PG (ref 26–34)
MCHC RBC AUTO-ENTMCNC: 33.2 G/DL (ref 31–37)
MCV RBC AUTO: 91.2 FL
MONOCYTES # BLD AUTO: 0.28 X10(3) UL (ref 0.1–1)
MONOCYTES NFR BLD AUTO: 5.8 %
NEUTROPHILS # BLD AUTO: 2.61 X10 (3) UL (ref 1.5–7.7)
NEUTROPHILS # BLD AUTO: 2.61 X10(3) UL (ref 1.5–7.7)
NEUTROPHILS NFR BLD AUTO: 53.7 %
NITRITE UR QL STRIP.AUTO: NEGATIVE
NT-PROBNP SERPL-MCNC: 4165 PG/ML (ref ?–450)
OSMOLALITY SERPL CALC.SUM OF ELEC: 275 MOSM/KG (ref 275–295)
PH UR STRIP.AUTO: 6 [PH] (ref 5–8)
PLATELET # BLD AUTO: 156 10(3)UL (ref 150–450)
POTASSIUM SERPL-SCNC: 3.4 MMOL/L (ref 3.5–5.1)
PROT SERPL-MCNC: 7.4 G/DL (ref 6.4–8.2)
PROT UR STRIP.AUTO-MCNC: NEGATIVE MG/DL
PROTHROMBIN TIME: 43.3 SECONDS (ref 11.6–14.8)
RBC # BLD AUTO: 3.63 X10(6)UL
RBC UR QL AUTO: NEGATIVE
SODIUM SERPL-SCNC: 132 MMOL/L (ref 136–145)
SP GR UR STRIP.AUTO: 1.01 (ref 1–1.03)
TROPONIN I SERPL HS-MCNC: 10 NG/L
UROBILINOGEN UR STRIP.AUTO-MCNC: NORMAL MG/DL
WBC # BLD AUTO: 4.9 X10(3) UL (ref 4–11)

## 2024-06-21 PROCEDURE — 85025 COMPLETE CBC W/AUTO DIFF WBC: CPT | Performed by: EMERGENCY MEDICINE

## 2024-06-21 PROCEDURE — 83880 ASSAY OF NATRIURETIC PEPTIDE: CPT | Performed by: EMERGENCY MEDICINE

## 2024-06-21 PROCEDURE — 71045 X-RAY EXAM CHEST 1 VIEW: CPT

## 2024-06-21 PROCEDURE — 85610 PROTHROMBIN TIME: CPT | Performed by: EMERGENCY MEDICINE

## 2024-06-21 PROCEDURE — 93005 ELECTROCARDIOGRAM TRACING: CPT

## 2024-06-21 PROCEDURE — 96375 TX/PRO/DX INJ NEW DRUG ADDON: CPT

## 2024-06-21 PROCEDURE — 96365 THER/PROPH/DIAG IV INF INIT: CPT

## 2024-06-21 PROCEDURE — 36415 COLL VENOUS BLD VENIPUNCTURE: CPT

## 2024-06-21 PROCEDURE — 87040 BLOOD CULTURE FOR BACTERIA: CPT | Performed by: EMERGENCY MEDICINE

## 2024-06-21 PROCEDURE — 99284 EMERGENCY DEPT VISIT MOD MDM: CPT

## 2024-06-21 PROCEDURE — 84484 ASSAY OF TROPONIN QUANT: CPT | Performed by: EMERGENCY MEDICINE

## 2024-06-21 PROCEDURE — 85730 THROMBOPLASTIN TIME PARTIAL: CPT | Performed by: EMERGENCY MEDICINE

## 2024-06-21 PROCEDURE — 83605 ASSAY OF LACTIC ACID: CPT | Performed by: EMERGENCY MEDICINE

## 2024-06-21 PROCEDURE — 99285 EMERGENCY DEPT VISIT HI MDM: CPT

## 2024-06-21 PROCEDURE — 81003 URINALYSIS AUTO W/O SCOPE: CPT | Performed by: EMERGENCY MEDICINE

## 2024-06-21 PROCEDURE — 93010 ELECTROCARDIOGRAM REPORT: CPT

## 2024-06-21 PROCEDURE — 80053 COMPREHEN METABOLIC PANEL: CPT | Performed by: EMERGENCY MEDICINE

## 2024-06-21 RX ORDER — FUROSEMIDE 10 MG/ML
40 INJECTION INTRAMUSCULAR; INTRAVENOUS ONCE
Status: COMPLETED | OUTPATIENT
Start: 2024-06-21 | End: 2024-06-21

## 2024-06-21 RX ORDER — ONDANSETRON 4 MG/1
4 TABLET, ORALLY DISINTEGRATING ORAL EVERY 4 HOURS PRN
Qty: 10 TABLET | Refills: 0 | Status: SHIPPED | OUTPATIENT
Start: 2024-06-21 | End: 2024-06-28

## 2024-06-21 RX ORDER — CARVEDILOL 12.5 MG/1
12.5 TABLET ORAL 2 TIMES DAILY
COMMUNITY

## 2024-06-21 RX ORDER — WARFARIN SODIUM 2.5 MG/1
TABLET ORAL
COMMUNITY
Start: 2024-05-07

## 2024-06-21 RX ORDER — DOXYCYCLINE HYCLATE 100 MG/1
100 CAPSULE ORAL ONCE
Status: COMPLETED | OUTPATIENT
Start: 2024-06-21 | End: 2024-06-21

## 2024-06-21 RX ORDER — DOXYCYCLINE HYCLATE 100 MG/1
100 CAPSULE ORAL 2 TIMES DAILY
Qty: 20 CAPSULE | Refills: 0 | Status: SHIPPED | OUTPATIENT
Start: 2024-06-21 | End: 2024-07-01

## 2024-06-21 RX ORDER — CEFPODOXIME PROXETIL 200 MG/1
200 TABLET, FILM COATED ORAL 2 TIMES DAILY
Qty: 20 TABLET | Refills: 0 | Status: SHIPPED | OUTPATIENT
Start: 2024-06-21 | End: 2024-07-01

## 2024-06-21 NOTE — ED QUICK NOTES
Orders for admission, patient is aware of plan and ready to go upstairs. Any questions, please call ED RN Martin RN at extension [phone].     Patient Covid vaccination status: Fully vaccinated     COVID Test Ordered in ED: None    COVID Suspicion at Admission: N/A    Running Infusions:  None    Mental Status/LOC at time of transport: a/o x 1 patient is non-english speaking daughter is at bedside    Other pertinent information:   CIWA score: N/A   NIH score:  N/A

## 2024-06-21 NOTE — ED INITIAL ASSESSMENT (HPI)
Pt was seen here on 6/5 for increased SOB.  Daughter at bed side.  Family states wheezing and cough yesterday.  Regular and shallow resp., which is not new .  INR elevated.  PCD instructed to come to ER.  Sats 95%.

## 2024-06-21 NOTE — ED PROVIDER NOTES
Patient Seen in: [facility] Emergency Department      History     Chief Complaint   Patient presents with    Difficulty Breathing     Stated Complaint: chase    Subjective:     HPI    89-year-old male with atrial fibrillation (apixaban), hypertension( carvedilol, diltiazem,), CHF (furosemide) who presented with a history of increased weakness and cough over the past few weeks.  he also complained of an increased level of lethargy and tiredness. He noted wheezing and a significant increase in shortness of breath.     I has been managing her Coumadin dosing.  His INR was elevated a few days ago and he was told to stop her Coumadin for 2 days.     Objective:   Past Medical History:    Arrhythmia    Atrial fibrillation (HCC)    Cancer (HCC)    Congestive heart disease (HCC)    Essential hypertension              Past Surgical History:   Procedure Laterality Date    Breast surgery      Total abdom hysterectomy                  Social History     Socioeconomic History    Marital status: Single   Tobacco Use    Smoking status: Never    Smokeless tobacco: Never   Substance and Sexual Activity    Alcohol use: Never     Social Determinants of Health     Food Insecurity: No Food Insecurity (4/17/2024)    Food Insecurity     Food Insecurity: Never true   Transportation Needs: No Transportation Needs (4/17/2024)    Transportation Needs     Lack of Transportation: No   Housing Stability: Low Risk  (4/17/2024)    Housing Stability     Housing Instability: No              Review of Systems    Positive for stated complaint: chase  Other systems are as noted in HPI.  Constitutional and vital signs reviewed.      All other systems reviewed and negative except as noted above.    Physical Exam     ED Triage Vitals [06/21/24 1448]   /83   Pulse 79   Resp 21   Temp 97.6 °F (36.4 °C)   Temp src Oral   SpO2 95 %   O2 Device None (Room air)       Current:/81   Pulse 65   Temp 97.6 °F (36.4 °C) (Oral)   Resp 15   Wt 63.5 kg    SpO2 96%   BMI 26.45 kg/m²     General:  Vitals as listed.  No acute distress   HEENT: Sclerae anicteric.  Conjunctivae show no pallor.  Oropharynx clear, mucous membranes moist   Lungs: Decreased air exchange and bibasilar crackles   Heart: regular rate rhythm and no murmur   Abdomen: Soft and nontender.  No abdominal masses.  No peritoneal signs   Extremities: Mild edema, normal peripheral pulses   Neuro: Alert oriented and nonfocal      ED Course     Labs Reviewed   COMP METABOLIC PANEL (14) - Abnormal; Notable for the following components:       Result Value    Glucose 114 (*)     Sodium 132 (*)     Potassium 3.4 (*)     Creatinine 1.20 (*)     eGFR-Cr 42 (*)     A/G Ratio 0.9 (*)     All other components within normal limits   PRO BETA NATRIURETIC PEPTIDE - Abnormal; Notable for the following components:    Pro-Beta Natriuretic Peptide 4,165 (*)     All other components within normal limits   PROTHROMBIN TIME (PT) - Abnormal; Notable for the following components:    PT 43.3 (*)     INR 4.46 (*)     All other components within normal limits   PTT, ACTIVATED - Abnormal; Notable for the following components:    PTT 44.1 (*)     All other components within normal limits   URINALYSIS WITH CULTURE REFLEX - Abnormal; Notable for the following components:    Urine Color Colorless (*)     All other components within normal limits   CBC W/ DIFFERENTIAL - Abnormal; Notable for the following components:    RBC 3.63 (*)     HGB 11.0 (*)     HCT 33.1 (*)     All other components within normal limits   TROPONIN I HIGH SENSITIVITY - Normal   LACTIC ACID, PLASMA - Normal   CBC WITH DIFFERENTIAL WITH PLATELET    Narrative:     The following orders were created for panel order CBC With Differential With Platelet.  Procedure                               Abnormality         Status                     ---------                               -----------         ------                     CBC W/ DIFFERENTIAL[[id number]]           Abnormal            Final result                 Please view results for these tests on the individual orders.   BLOOD CULTURE   BLOOD CULTURE     XR CHEST AP PORTABLE  (CPT=71045)    Result Date: 6/21/2024  CONCLUSION:  New opacity right mid lung zone consistent with pneumonia.  Borderline pulmonary vascular congestion.   LOCATION:  [id number]      Dictated by (CST): Jorge Lawson MD on 6/21/2024 at 4:33 PM     Finalized by (CST): Jorge Lawson MD on 6/21/2024 at 4:35 PM        I independently read the radiographs and raise shows some chronic right-sided fibrotic changes  EKG    Rate, intervals and axes as noted on EKG Report.  Rate: 72  Rhythm: Atrial Fibrillation  Reading: No acute ST-T changes           ED COURSE and MDM     Sources of the medical history included the patient and daughter    I reviewed prior external notes including cardiogram done 4/18/2024 that showed an ejection fraction of 60 to 65% and moderate tricuspid regurgitation and elevated pulmonary artery pressure    Patient given Lasix.  Since chest x-ray is concerning for pneumonia, she was given Rocephin and doxycycline.    Patient advised to continue to hold Coumadin until Monday and call [facility] for further guidance.    I have discussed with the patient the results of testing, differential diagnosis, and treatment plan. They expressed clear understanding of these instructions and agrees to the plan provided.    Disposition and Plan     Clinical Impression:  1. Community acquired pneumonia of right middle lobe of lung    2. Acute on chronic congestive heart failure, unspecified heart failure type (HCC)    3. Elevated INR    4. Situational depression         Disposition:  Discharge  6/21/2024  6:25 pm    Follow-up:  No follow-up provider specified.      Medications Prescribed:  Discharge Medication List as of 6/21/2024  6:43 PM        START taking these medications    Details   cefpodoxime 200 MG Oral Tab Take 1 tablet (200 mg total) by mouth 2 (two)  times daily for 10 days., Normal, Disp-20 tablet, R-0      doxycycline 100 MG Oral Cap Take 1 capsule (100 mg total) by mouth 2 (two) times daily for 10 days., Normal, Disp-20 capsule, R-0      ondansetron 4 MG Oral Tablet Dispersible Take 1 tablet (4 mg total) by mouth every 4 (four) hours as needed for Nausea., Normal, Disp-10 tablet, R-0

## 2024-06-21 NOTE — DISCHARGE INSTRUCTIONS
Drink less water and more gatorade.    Hold Coumadin until Monday and then follow dosing instructions set by your cardiologist

## 2024-06-23 LAB
Q-T INTERVAL: 202 MS
QRS DURATION: 84 MS
QTC CALCULATION (BEZET): 221 MS
R AXIS: 45 DEGREES
T AXIS: 212 DEGREES
VENTRICULAR RATE: 72 BPM

## 2024-07-03 ENCOUNTER — HOSPITAL ENCOUNTER (EMERGENCY)
Age: 89
Discharge: HOME | End: 2024-07-03
Attending: EMERGENCY MEDICINE
Payer: MEDICARE

## 2024-07-03 ENCOUNTER — APPOINTMENT (OUTPATIENT)
Dept: CT IMAGING | Age: 89
End: 2024-07-03
Attending: EMERGENCY MEDICINE
Payer: MEDICARE

## 2024-07-03 VITALS
OXYGEN SATURATION: 96 % | WEIGHT: 140 LBS | SYSTOLIC BLOOD PRESSURE: 107 MMHG | DIASTOLIC BLOOD PRESSURE: 51 MMHG | BODY MASS INDEX: 26 KG/M2 | HEART RATE: 92 BPM | RESPIRATION RATE: 17 BRPM | TEMPERATURE: 98 F

## 2024-07-03 DIAGNOSIS — K57.92 ACUTE DIVERTICULITIS: Primary | ICD-10-CM

## 2024-07-03 DIAGNOSIS — N30.00 ACUTE CYSTITIS WITHOUT HEMATURIA: ICD-10-CM

## 2024-07-03 LAB
ALBUMIN SERPL-MCNC: 2.9 G/DL (ref 3.4–5)
ALBUMIN/GLOB SERPL: 0.7 {RATIO} (ref 1–2)
ALP LIVER SERPL-CCNC: 92 U/L
ALT SERPL-CCNC: 17 U/L
ANION GAP SERPL CALC-SCNC: 7 MMOL/L (ref 0–18)
APTT PPP: 62.6 SECONDS (ref 23–36)
AST SERPL-CCNC: 24 U/L (ref 15–37)
BASOPHILS # BLD AUTO: 0.03 X10(3) UL (ref 0–0.2)
BASOPHILS NFR BLD AUTO: 0.6 %
BILIRUB SERPL-MCNC: 0.9 MG/DL (ref 0.1–2)
BILIRUB UR QL STRIP.AUTO: NEGATIVE
BUN BLD-MCNC: 37 MG/DL (ref 9–23)
CALCIUM BLD-MCNC: 9.1 MG/DL (ref 8.5–10.1)
CHLORIDE SERPL-SCNC: 99 MMOL/L (ref 98–112)
CO2 SERPL-SCNC: 27 MMOL/L (ref 21–32)
COLOR UR AUTO: YELLOW
CREAT BLD-MCNC: 1.62 MG/DL
EGFRCR SERPLBLD CKD-EPI 2021: 29 ML/MIN/1.73M2 (ref 60–?)
EOSINOPHIL # BLD AUTO: 0.05 X10(3) UL (ref 0–0.7)
EOSINOPHIL NFR BLD AUTO: 1 %
ERYTHROCYTE [DISTWIDTH] IN BLOOD BY AUTOMATED COUNT: 14.4 %
GLOBULIN PLAS-MCNC: 4.1 G/DL (ref 2.8–4.4)
GLUCOSE BLD-MCNC: 126 MG/DL (ref 70–99)
GLUCOSE UR STRIP.AUTO-MCNC: NEGATIVE MG/DL
HCT VFR BLD AUTO: 31.6 %
HGB BLD-MCNC: 10.5 G/DL
IMM GRANULOCYTES # BLD AUTO: 0.05 X10(3) UL (ref 0–1)
IMM GRANULOCYTES NFR BLD: 1 %
INR BLD: 4.04 (ref 0.8–1.2)
KETONES UR STRIP.AUTO-MCNC: NEGATIVE MG/DL
LIPASE SERPL-CCNC: 22 U/L (ref 13–75)
LYMPHOCYTES # BLD AUTO: 1.28 X10(3) UL (ref 1–4)
LYMPHOCYTES NFR BLD AUTO: 26.5 %
MCH RBC QN AUTO: 30.2 PG (ref 26–34)
MCHC RBC AUTO-ENTMCNC: 33.2 G/DL (ref 31–37)
MCV RBC AUTO: 90.8 FL
MONOCYTES # BLD AUTO: 0.38 X10(3) UL (ref 0.1–1)
MONOCYTES NFR BLD AUTO: 7.9 %
NEUTROPHILS # BLD AUTO: 3.04 X10 (3) UL (ref 1.5–7.7)
NEUTROPHILS # BLD AUTO: 3.04 X10(3) UL (ref 1.5–7.7)
NEUTROPHILS NFR BLD AUTO: 63 %
NITRITE UR QL STRIP.AUTO: NEGATIVE
OSMOLALITY SERPL CALC.SUM OF ELEC: 286 MOSM/KG (ref 275–295)
PH UR STRIP.AUTO: 6.5 [PH] (ref 5–8)
PLATELET # BLD AUTO: 163 10(3)UL (ref 150–450)
POTASSIUM SERPL-SCNC: 3.2 MMOL/L (ref 3.5–5.1)
PROT SERPL-MCNC: 7 G/DL (ref 6.4–8.2)
PROTHROMBIN TIME: 40 SECONDS (ref 11.6–14.8)
RBC # BLD AUTO: 3.48 X10(6)UL
SODIUM SERPL-SCNC: 133 MMOL/L (ref 136–145)
SP GR UR STRIP.AUTO: 1.01 (ref 1–1.03)
UROBILINOGEN UR STRIP.AUTO-MCNC: 0.2 MG/DL
WBC # BLD AUTO: 4.8 X10(3) UL (ref 4–11)

## 2024-07-03 PROCEDURE — 87186 SC STD MICRODIL/AGAR DIL: CPT | Performed by: EMERGENCY MEDICINE

## 2024-07-03 PROCEDURE — 81015 MICROSCOPIC EXAM OF URINE: CPT | Performed by: EMERGENCY MEDICINE

## 2024-07-03 PROCEDURE — 87077 CULTURE AEROBIC IDENTIFY: CPT | Performed by: EMERGENCY MEDICINE

## 2024-07-03 PROCEDURE — 81001 URINALYSIS AUTO W/SCOPE: CPT | Performed by: EMERGENCY MEDICINE

## 2024-07-03 PROCEDURE — 85730 THROMBOPLASTIN TIME PARTIAL: CPT | Performed by: EMERGENCY MEDICINE

## 2024-07-03 PROCEDURE — 96361 HYDRATE IV INFUSION ADD-ON: CPT

## 2024-07-03 PROCEDURE — 99285 EMERGENCY DEPT VISIT HI MDM: CPT

## 2024-07-03 PROCEDURE — 87086 URINE CULTURE/COLONY COUNT: CPT | Performed by: EMERGENCY MEDICINE

## 2024-07-03 PROCEDURE — 85610 PROTHROMBIN TIME: CPT | Performed by: EMERGENCY MEDICINE

## 2024-07-03 PROCEDURE — 83690 ASSAY OF LIPASE: CPT | Performed by: EMERGENCY MEDICINE

## 2024-07-03 PROCEDURE — 99284 EMERGENCY DEPT VISIT MOD MDM: CPT

## 2024-07-03 PROCEDURE — 74176 CT ABD & PELVIS W/O CONTRAST: CPT | Performed by: EMERGENCY MEDICINE

## 2024-07-03 PROCEDURE — 96360 HYDRATION IV INFUSION INIT: CPT

## 2024-07-03 PROCEDURE — 80053 COMPREHEN METABOLIC PANEL: CPT | Performed by: EMERGENCY MEDICINE

## 2024-07-03 PROCEDURE — 85025 COMPLETE CBC W/AUTO DIFF WBC: CPT | Performed by: EMERGENCY MEDICINE

## 2024-07-03 RX ORDER — CEFDINIR 300 MG/1
300 CAPSULE ORAL 2 TIMES DAILY
Qty: 20 CAPSULE | Refills: 0 | Status: SHIPPED | OUTPATIENT
Start: 2024-07-03 | End: 2024-07-03 | Stop reason: RX

## 2024-07-03 RX ORDER — CEFDINIR 300 MG/1
300 CAPSULE ORAL 2 TIMES DAILY
Qty: 20 CAPSULE | Refills: 0 | Status: SHIPPED | OUTPATIENT
Start: 2024-07-03 | End: 2024-07-13

## 2024-07-03 RX ORDER — SODIUM CHLORIDE 9 MG/ML
125 INJECTION, SOLUTION INTRAVENOUS CONTINUOUS
Status: DISCONTINUED | OUTPATIENT
Start: 2024-07-03 | End: 2024-07-03

## 2024-07-03 RX ORDER — ARIPIPRAZOLE 15 MG/1
40 TABLET ORAL ONCE
Status: DISCONTINUED | OUTPATIENT
Start: 2024-07-03 | End: 2024-07-03

## 2024-07-03 RX ORDER — POTASSIUM CHLORIDE 1.5 G/1.58G
40 POWDER, FOR SOLUTION ORAL ONCE
Status: COMPLETED | OUTPATIENT
Start: 2024-07-03 | End: 2024-07-03

## 2024-07-03 NOTE — ED INITIAL ASSESSMENT (HPI)
Patient presents with daughter in-law who states patient reports constipation for 3 days, states \"She says she is going but in little pieces. She says it feels like something is in there but is not coming out and she says she's not urinating much, but she also has not been eating or drinking well so I don't know.\" Patient appears alert, oriented and talking language appropriately with daughter in-law. No distress noted.

## 2024-07-03 NOTE — ED PROVIDER NOTES
Patient Seen in: ward Emergency Department In [place]      History     Chief Complaint   Patient presents with    Constipation    Urinary Symptoms     Stated Complaint: constipated x3-4 days and difficulty urinating    Subjective:   HPI    Patient 89-year-old female history obtained primarily from daughter.  Patient has been complaining of some nausea for the past 3 to 4 days.  Patient is a decreased p.o. intake.  Patient complained of some abdominal pain increasing over last day or so.  Patient has had decreased bowel movements small firm hard stool was able to pass it.  Patient also has had decreased urine output.  Patient denies fevers, no chest pain, shortness of breath, remainder of review of systems negative.    Objective:   Past Medical History:    Arrhythmia    Atrial fibrillation (HCC)    Cancer (HCC)    Congestive heart disease (HCC)    Essential hypertension              Past Surgical History:   Procedure Laterality Date    Breast surgery      Total abdom hysterectomy                  Social History     Socioeconomic History    Marital status: Single   Tobacco Use    Smoking status: Never    Smokeless tobacco: Never   Substance and Sexual Activity    Alcohol use: Never    Drug use: Never     Social Determinants of Health     Food Insecurity: No Food Insecurity (4/17/2024)    Food Insecurity     Food Insecurity: Never true   Transportation Needs: No Transportation Needs (4/17/2024)    Transportation Needs     Lack of Transportation: No   Housing Stability: Low Risk  (4/17/2024)    Housing Stability     Housing Instability: No              Review of Systems    Positive for stated Chief Complaint: Constipation and Urinary Symptoms    Other systems are as noted in HPI.  Constitutional and vital signs reviewed.      All other systems reviewed and negative except as noted above.    Physical Exam     ED Triage Vitals [07/03/24 1632]   /66   Pulse 85   Resp 17   Temp 98.6 °F (37 °C)   Temp src  Temporal   SpO2 98 %   O2 Device None (Room air)       Current Vitals:   Vital Signs  BP: 107/51  Pulse: 92  Resp: 17  Temp: 98 °F (36.7 °C)  Temp src: Temporal    Oxygen Therapy  SpO2: 96 %  O2 Device: None (Room air)            Physical Exam  GENERAL: Patient resting on the cart in no acute distress.  HEENT: Extraocular muscles intact, pupils equal round reactive to light.  Mouth normal, neck supple, no meningismus.  LUNGS: Lungs clear to auscultation bilaterally.  CARDIOVASCULAR: + S1-S2, regular rate and rhythm, no murmurs.  BACK: No CVA tenderness, no midline bony tenderness.  ABDOMEN: + Bowel sounds, soft, mild diffuse tenderness, nondistended.  No rebound, no guarding, no hepatosplenomegaly.  EXTREMITIES: Full range of motion, no tenderness, good capillary refill.  SKIN: No rash, good turgor.  NEURO: Patient answers questions appropriately to daughter.  No focal deficits appreciated.           ED Course     Labs Reviewed   COMP METABOLIC PANEL (14) - Abnormal; Notable for the following components:       Result Value    Glucose 126 (*)     Sodium 133 (*)     Potassium 3.2 (*)     BUN 37 (*)     Creatinine 1.62 (*)     eGFR-Cr 29 (*)     Albumin 2.9 (*)     A/G Ratio 0.7 (*)     All other components within normal limits   PROTHROMBIN TIME (PT) - Abnormal; Notable for the following components:    PT 40.0 (*)     INR 4.04 (*)     All other components within normal limits   PTT, ACTIVATED - Abnormal; Notable for the following components:    PTT 62.6 (*)     All other components within normal limits   URINALYSIS WITH CULTURE REFLEX - Abnormal; Notable for the following components:    Clarity Urine Cloudy (*)     Blood Urine Moderate (*)     Protein Urine Trace (*)     Leukocyte Esterase Urine Moderate (*)     All other components within normal limits   UA MICROSCOPIC ONLY, URINE - Abnormal; Notable for the following components:    Bacteria Urine 1+ (*)     Squamous Epi. Cells Few (*)     All other components within  normal limits   CBC W/ DIFFERENTIAL - Abnormal; Notable for the following components:    RBC 3.48 (*)     HGB 10.5 (*)     HCT 31.6 (*)     All other components within normal limits   LIPASE - Normal   CBC WITH DIFFERENTIAL WITH PLATELET    Narrative:     The following orders were created for panel order CBC With Differential With Platelet.  Procedure                               Abnormality         Status                     ---------                               -----------         ------                     CBC W/ DIFFERENTIAL[[id number]]          Abnormal            Final result                 Please view results for these tests on the individual orders.   URINE CULTURE, ROUTINE             CT abdomen pelvis1. Consolidations at the lung bases along with interlobular septal thickening at the lung bases may be due to mild fluid overload.      2. There is suggestion of a mild acute diverticulitis involving the distal transverse colon.      Independently reviewed by myself, no free air          MDM      patient was given some IV fluids.  Patient stable throughout her stay in the emergency department.  Patient did have a large bowel movement     While in the emergency department.  Patient did feel improved from her constipation.  Patient CT concerning for diverticulitis.  Patient will be treated with Omnicef.  Will hold Flagyl as significant reaction with her Coumadin which is already elevated.  Recommend recheck of Coumadin within 2 days with primary physician.  As well as reevaluation of her diverticulitis.  Patient has no shortness of breath.  Patient felt comfortable going home.  Recommend clear liquid diet for the next 2 days.  Tylenol as needed.  Return if new or worse symptoms.  I did consider diverticulitis, bowel obstruction, UTI.  Patient is urine concerning for UTI but is on Omnicef.                                   Medical Decision Making      Disposition and Plan     Clinical Impression:  1. Acute  diverticulitis    2. Acute cystitis without hematuria         Disposition:  Discharge  7/3/2024  7:59 pm    Follow-up:  Wicho Ward  [address]  [place] [address] [place] [id number] [phone]    Follow up in 2 day(s)            Medications Prescribed:  Current Discharge Medication List        START taking these medications    Details   cefdinir 300 MG Oral Cap Take 1 capsule (300 mg total) by mouth 2 (two) times daily for 10 days.  Qty: 20 capsule, Refills: 0

## 2024-07-04 NOTE — DISCHARGE INSTRUCTIONS
Follow-up for further evaluation with primary physician.  Call for an appointment.  Clear liquid diet for the next 2 days.  May use Tylenol as needed.  Antibiotics as prescribed.  Return if new or worse symptoms.

## 2024-07-06 NOTE — ED NOTES
Per Dr. Mathis, call and see how patient is feeling has patient has UTI and diverticulitis being treated with cefdinir. Also find out what allergy patient has to penicillins

## 2024-07-06 NOTE — ED NOTES
Spoke to patient family member re: patient status - patient reported to family she feels increasing gas which she thinks is from the medication. Family does not know what the allergy to pcn is. Informed family that with patient symptoms and diagnosis the recommendation from the doctor is to bring the patient to the hospital for re-eval. Patient family states they will wait and see if patient gets better at home. Informed of risks, verbalized understanding.

## 2024-09-10 ENCOUNTER — LAB ENCOUNTER (OUTPATIENT)
Dept: LAB | Age: 89
End: 2024-09-10
Attending: INTERNAL MEDICINE
Payer: MEDICARE

## 2024-09-10 DIAGNOSIS — I48.91 ATRIAL FIBRILLATION WITH RAPID VENTRICULAR RESPONSE (HCC): ICD-10-CM

## 2024-09-10 DIAGNOSIS — I50.9 HEART FAILURE, UNSPECIFIED (HCC): Primary | ICD-10-CM

## 2024-09-10 DIAGNOSIS — I50.9 ACUTE ON CHRONIC CONGESTIVE HEART FAILURE, UNSPECIFIED HEART FAILURE TYPE (HCC): ICD-10-CM

## 2024-09-10 DIAGNOSIS — I48.21 PERMANENT ATRIAL FIBRILLATION (HCC): ICD-10-CM

## 2024-09-10 LAB
ALBUMIN SERPL-MCNC: 3.5 G/DL (ref 3.2–4.8)
ALBUMIN/GLOB SERPL: 1 {RATIO} (ref 1–2)
ALP LIVER SERPL-CCNC: 64 U/L
ALT SERPL-CCNC: 11 U/L
ANION GAP SERPL CALC-SCNC: 7 MMOL/L (ref 0–18)
AST SERPL-CCNC: 24 U/L (ref ?–34)
BILIRUB SERPL-MCNC: 0.4 MG/DL (ref 0.2–0.9)
BUN BLD-MCNC: 17 MG/DL (ref 9–23)
CALCIUM BLD-MCNC: 9.3 MG/DL (ref 8.7–10.4)
CHLORIDE SERPL-SCNC: 104 MMOL/L (ref 98–112)
CO2 SERPL-SCNC: 27 MMOL/L (ref 21–32)
CREAT BLD-MCNC: 1.29 MG/DL
EGFRCR SERPLBLD CKD-EPI 2021: 38 ML/MIN/1.73M2 (ref 60–?)
FASTING STATUS PATIENT QL REPORTED: YES
GLOBULIN PLAS-MCNC: 3.6 G/DL (ref 2–3.5)
GLUCOSE BLD-MCNC: 120 MG/DL (ref 70–99)
OSMOLALITY SERPL CALC.SUM OF ELEC: 289 MOSM/KG (ref 275–295)
POTASSIUM SERPL-SCNC: 3.6 MMOL/L (ref 3.5–5.1)
PROT SERPL-MCNC: 7.1 G/DL (ref 5.7–8.2)
SODIUM SERPL-SCNC: 138 MMOL/L (ref 136–145)

## 2024-09-10 PROCEDURE — 80053 COMPREHEN METABOLIC PANEL: CPT

## 2024-09-10 PROCEDURE — 36415 COLL VENOUS BLD VENIPUNCTURE: CPT

## 2025-04-24 ENCOUNTER — APPOINTMENT (OUTPATIENT)
Dept: GENERAL RADIOLOGY | Age: OVER 89
End: 2025-04-24
Attending: EMERGENCY MEDICINE
Payer: MEDICARE

## 2025-04-24 ENCOUNTER — HOSPITAL ENCOUNTER (INPATIENT)
Facility: HOSPITAL | Age: OVER 89
LOS: 6 days | Discharge: HOME | End: 2025-04-30
Attending: EMERGENCY MEDICINE | Admitting: INTERNAL MEDICINE
Payer: MEDICARE

## 2025-04-24 DIAGNOSIS — I50.9 ACUTE ON CHRONIC CONGESTIVE HEART FAILURE, UNSPECIFIED HEART FAILURE TYPE (HCC): ICD-10-CM

## 2025-04-24 DIAGNOSIS — I48.91 ATRIAL FIBRILLATION WITH RVR (HCC): Primary | ICD-10-CM

## 2025-04-24 LAB
ADENOVIRUS PCR:: NOT DETECTED
ALBUMIN SERPL-MCNC: 4 G/DL (ref 3.2–4.8)
ALBUMIN/GLOB SERPL: 1.3 {RATIO} (ref 1–2)
ALP LIVER SERPL-CCNC: 76 U/L (ref 55–142)
ALT SERPL-CCNC: 13 U/L (ref 10–49)
ANION GAP SERPL CALC-SCNC: 8 MMOL/L (ref 0–18)
APTT PPP: 39.2 SECONDS (ref 23–36)
AST SERPL-CCNC: 34 U/L (ref ?–34)
B PARAPERT DNA SPEC QL NAA+PROBE: NOT DETECTED
B PERT DNA SPEC QL NAA+PROBE: NOT DETECTED
BASOPHILS # BLD AUTO: 0.05 X10(3) UL (ref 0–0.2)
BASOPHILS NFR BLD AUTO: 0.8 %
BILIRUB SERPL-MCNC: 0.6 MG/DL (ref 0.2–0.9)
BUN BLD-MCNC: 13 MG/DL (ref 9–23)
C PNEUM DNA SPEC QL NAA+PROBE: NOT DETECTED
CALCIUM BLD-MCNC: 9.5 MG/DL (ref 8.7–10.6)
CHLORIDE SERPL-SCNC: 104 MMOL/L (ref 98–112)
CO2 SERPL-SCNC: 23 MMOL/L (ref 21–32)
CORONAVIRUS 229E PCR:: NOT DETECTED
CORONAVIRUS HKU1 PCR:: NOT DETECTED
CORONAVIRUS NL63 PCR:: NOT DETECTED
CORONAVIRUS OC43 PCR:: NOT DETECTED
CREAT BLD-MCNC: 1.23 MG/DL (ref 0.55–1.02)
EGFRCR SERPLBLD CKD-EPI 2021: 40 ML/MIN/1.73M2 (ref 60–?)
EOSINOPHIL # BLD AUTO: 0.26 X10(3) UL (ref 0–0.7)
EOSINOPHIL NFR BLD AUTO: 4.2 %
ERYTHROCYTE [DISTWIDTH] IN BLOOD BY AUTOMATED COUNT: 14.7 %
FLUAV RNA SPEC QL NAA+PROBE: NOT DETECTED
FLUBV RNA SPEC QL NAA+PROBE: NOT DETECTED
GLOBULIN PLAS-MCNC: 3.2 G/DL (ref 2–3.5)
GLUCOSE BLD-MCNC: 133 MG/DL (ref 70–99)
HCT VFR BLD AUTO: 33 % (ref 35–48)
HGB BLD-MCNC: 11 G/DL (ref 12–16)
IMM GRANULOCYTES # BLD AUTO: 0.07 X10(3) UL (ref 0–1)
IMM GRANULOCYTES NFR BLD: 1.1 %
INR BLD: 1.36 (ref 0.8–1.2)
LYMPHOCYTES # BLD AUTO: 2.6 X10(3) UL (ref 1–4)
LYMPHOCYTES NFR BLD AUTO: 41.7 %
MAGNESIUM SERPL-MCNC: 1.7 MG/DL (ref 1.6–2.6)
MCH RBC QN AUTO: 32.2 PG (ref 26–34)
MCHC RBC AUTO-ENTMCNC: 33.3 G/DL (ref 31–37)
MCV RBC AUTO: 96.5 FL (ref 80–100)
METAPNEUMOVIRUS PCR:: NOT DETECTED
MONOCYTES # BLD AUTO: 0.42 X10(3) UL (ref 0.1–1)
MONOCYTES NFR BLD AUTO: 6.7 %
MRSA DNA SPEC QL NAA+PROBE: NEGATIVE
MYCOPLASMA PNEUMONIA PCR:: NOT DETECTED
NEUTROPHILS # BLD AUTO: 2.84 X10 (3) UL (ref 1.5–7.7)
NEUTROPHILS # BLD AUTO: 2.84 X10(3) UL (ref 1.5–7.7)
NEUTROPHILS NFR BLD AUTO: 45.5 %
NT-PROBNP SERPL-MCNC: 2973 PG/ML (ref ?–450)
OSMOLALITY SERPL CALC.SUM OF ELEC: 282 MOSM/KG (ref 275–295)
PARAINFLUENZA 1 PCR:: NOT DETECTED
PARAINFLUENZA 2 PCR:: NOT DETECTED
PARAINFLUENZA 3 PCR:: NOT DETECTED
PARAINFLUENZA 4 PCR:: NOT DETECTED
PLATELET # BLD AUTO: 190 10(3)UL (ref 150–450)
POCT INFLUENZA A: NEGATIVE
POCT INFLUENZA B: NEGATIVE
POTASSIUM SERPL-SCNC: 3.8 MMOL/L (ref 3.5–5.1)
PROCALCITONIN SERPL-MCNC: 0.09 NG/ML (ref ?–0.05)
PROT SERPL-MCNC: 7.2 G/DL (ref 5.7–8.2)
PROTHROMBIN TIME: 16.6 SECONDS (ref 11.6–14.8)
RBC # BLD AUTO: 3.42 X10(6)UL (ref 3.8–5.3)
RHINOVIRUS/ENTERO PCR:: NOT DETECTED
RSV RNA SPEC QL NAA+PROBE: NOT DETECTED
SARS-COV-2 RNA NPH QL NAA+NON-PROBE: NOT DETECTED
SARS-COV-2 RNA RESP QL NAA+PROBE: NOT DETECTED
SODIUM SERPL-SCNC: 135 MMOL/L (ref 136–145)
TROPONIN I SERPL HS-MCNC: 9 NG/L (ref ?–34)
TSI SER-ACNC: 4.53 UIU/ML (ref 0.55–4.78)
WBC # BLD AUTO: 6.2 X10(3) UL (ref 4–11)

## 2025-04-24 PROCEDURE — 99223 1ST HOSP IP/OBS HIGH 75: CPT | Performed by: INTERNAL MEDICINE

## 2025-04-24 PROCEDURE — 71045 X-RAY EXAM CHEST 1 VIEW: CPT | Performed by: EMERGENCY MEDICINE

## 2025-04-24 RX ORDER — ALBUTEROL SULFATE 0.83 MG/ML
2.5 SOLUTION RESPIRATORY (INHALATION) EVERY 6 HOURS PRN
Status: DISCONTINUED | OUTPATIENT
Start: 2025-04-24 | End: 2025-04-24

## 2025-04-24 RX ORDER — POLYETHYLENE GLYCOL 3350 17 G/17G
17 POWDER, FOR SOLUTION ORAL DAILY PRN
Status: DISCONTINUED | OUTPATIENT
Start: 2025-04-24 | End: 2025-04-30

## 2025-04-24 RX ORDER — ALBUTEROL SULFATE 0.83 MG/ML
2.5 SOLUTION RESPIRATORY (INHALATION)
Status: DISCONTINUED | OUTPATIENT
Start: 2025-04-24 | End: 2025-04-26

## 2025-04-24 RX ORDER — BENZONATATE 200 MG/1
200 CAPSULE ORAL 3 TIMES DAILY PRN
Status: DISCONTINUED | OUTPATIENT
Start: 2025-04-24 | End: 2025-04-30

## 2025-04-24 RX ORDER — DOXYCYCLINE 100 MG/1
100 CAPSULE ORAL 2 TIMES DAILY
Status: DISCONTINUED | OUTPATIENT
Start: 2025-04-24 | End: 2025-04-25

## 2025-04-24 RX ORDER — ACETAMINOPHEN 500 MG
500 TABLET ORAL EVERY 4 HOURS PRN
Status: DISCONTINUED | OUTPATIENT
Start: 2025-04-24 | End: 2025-04-30

## 2025-04-24 RX ORDER — ONDANSETRON 2 MG/ML
INJECTION INTRAMUSCULAR; INTRAVENOUS
Status: COMPLETED
Start: 2025-04-24 | End: 2025-04-24

## 2025-04-24 RX ORDER — FUROSEMIDE 10 MG/ML
20 INJECTION INTRAMUSCULAR; INTRAVENOUS
Status: DISCONTINUED | OUTPATIENT
Start: 2025-04-24 | End: 2025-04-25

## 2025-04-24 RX ORDER — AZITHROMYCIN 250 MG/1
500 TABLET, FILM COATED ORAL DAILY
Status: DISCONTINUED | OUTPATIENT
Start: 2025-04-24 | End: 2025-04-24

## 2025-04-24 RX ORDER — DILTIAZEM HCL 60 MG
120 TABLET ORAL DAILY
Status: DISCONTINUED | OUTPATIENT
Start: 2025-04-24 | End: 2025-04-30

## 2025-04-24 RX ORDER — ECHINACEA PURPUREA EXTRACT 125 MG
1 TABLET ORAL
Status: DISCONTINUED | OUTPATIENT
Start: 2025-04-24 | End: 2025-04-30

## 2025-04-24 RX ORDER — ONDANSETRON 2 MG/ML
4 INJECTION INTRAMUSCULAR; INTRAVENOUS ONCE
Status: COMPLETED | OUTPATIENT
Start: 2025-04-24 | End: 2025-04-24

## 2025-04-24 RX ORDER — BISACODYL 10 MG
10 SUPPOSITORY, RECTAL RECTAL
Status: DISCONTINUED | OUTPATIENT
Start: 2025-04-24 | End: 2025-04-30

## 2025-04-24 RX ORDER — ONDANSETRON 2 MG/ML
4 INJECTION INTRAMUSCULAR; INTRAVENOUS EVERY 6 HOURS PRN
Status: DISCONTINUED | OUTPATIENT
Start: 2025-04-24 | End: 2025-04-30

## 2025-04-24 RX ORDER — CARVEDILOL 3.12 MG/1
6.25 TABLET ORAL 2 TIMES DAILY WITH MEALS
Status: DISCONTINUED | OUTPATIENT
Start: 2025-04-24 | End: 2025-04-25

## 2025-04-24 RX ORDER — CARVEDILOL 3.12 MG/1
3.12 TABLET ORAL 2 TIMES DAILY WITH MEALS
Status: DISCONTINUED | OUTPATIENT
Start: 2025-04-24 | End: 2025-04-24

## 2025-04-24 RX ORDER — ASPIRIN 81 MG/1
81 TABLET ORAL DAILY
Status: DISCONTINUED | OUTPATIENT
Start: 2025-04-24 | End: 2025-04-30

## 2025-04-24 RX ORDER — FUROSEMIDE 10 MG/ML
40 INJECTION INTRAMUSCULAR; INTRAVENOUS ONCE
Status: COMPLETED | OUTPATIENT
Start: 2025-04-24 | End: 2025-04-24

## 2025-04-24 RX ORDER — ALBUTEROL SULFATE 5 MG/ML
2.5 SOLUTION RESPIRATORY (INHALATION) EVERY 4 HOURS PRN
Status: DISCONTINUED | OUTPATIENT
Start: 2025-04-24 | End: 2025-04-30

## 2025-04-24 RX ORDER — DILTIAZEM HYDROCHLORIDE 5 MG/ML
10 INJECTION INTRAVENOUS ONCE
Status: COMPLETED | OUTPATIENT
Start: 2025-04-24 | End: 2025-04-24

## 2025-04-24 RX ORDER — TRAMADOL HYDROCHLORIDE 50 MG/1
50 TABLET ORAL EVERY 12 HOURS PRN
Status: DISCONTINUED | OUTPATIENT
Start: 2025-04-24 | End: 2025-04-30

## 2025-04-24 RX ORDER — METOCLOPRAMIDE HYDROCHLORIDE 5 MG/ML
5 INJECTION INTRAMUSCULAR; INTRAVENOUS EVERY 8 HOURS PRN
Status: DISCONTINUED | OUTPATIENT
Start: 2025-04-24 | End: 2025-04-30

## 2025-04-24 RX ORDER — METOPROLOL TARTRATE 1 MG/ML
5 INJECTION, SOLUTION INTRAVENOUS EVERY 4 HOURS PRN
Status: DISCONTINUED | OUTPATIENT
Start: 2025-04-24 | End: 2025-04-30

## 2025-04-24 RX ORDER — SENNOSIDES 8.6 MG
17.2 TABLET ORAL NIGHTLY PRN
Status: DISCONTINUED | OUTPATIENT
Start: 2025-04-24 | End: 2025-04-30

## 2025-04-24 NOTE — ED INITIAL ASSESSMENT (HPI)
C./o sob and diff breathing yesterday with some abd pain- diff breathing was increased this am- pt was asking for O2 on arrival

## 2025-04-24 NOTE — ED QUICK NOTES
Orders for admission, patient is aware of plan and ready to go upstairs. Any questions, please call ED RN Barbara at extension [phone].     Patient Covid vaccination status: Fully vaccinated     COVID Test Ordered in ED: Rapid SARS-CoV-2 by PCR    COVID Suspicion at Admission: N/A    Running Infusions: Medication Infusions[1] None    Mental Status/LOC at time of transport: A&o x3    Other pertinent information:   CIWA score: N/A   NIH score:  N/A             [1]

## 2025-04-24 NOTE — PROGRESS NOTES
Admitted patient @ ~1430  AO X3, Verbally but Talat speaking. Daughter at bedside to translate per pt preference. Pt on 2L of O2 for comfort. Denies SOB or pain.   Afib on Tele with HR in low 100s  Incontinent. Per daughter, she uses a walker at home. Pt was able to turn side to side.   IV lasix. PT/OT to eval and treat  Fair appetite meals.Fall and safety precautions in place

## 2025-04-24 NOTE — ED PROVIDER NOTES
Patient Seen in: [facility] In [place]      History     Chief Complaint   Patient presents with    Abdomen/Flank Pain    Difficulty Breathing     Stated Complaint: sob, abd pain    Subjective:   HPI    89-year-old female past medical history as below presents with shortness of breath.  Started yesterday.  No associated chest pain.  No nausea or vomiting or diaphoresis.  No reported fevers.  No leg swelling or pain.  History primarily provided by daughter at bedside.  History of Present Illness               Objective:     Past Medical History:    Arrhythmia    Atrial fibrillation (HCC)    Cancer (HCC)    Congestive heart disease (HCC)    Essential hypertension              Past Surgical History:   Procedure Laterality Date    Breast surgery      Total abdom hysterectomy                  Social History     Socioeconomic History    Marital status: Single   Tobacco Use    Smoking status: Never    Smokeless tobacco: Never   Substance and Sexual Activity    Alcohol use: Never    Drug use: Never     Social Drivers of Health     Food Insecurity: No Food Insecurity (4/17/2024)    Food Insecurity     Food Insecurity: Never true   Transportation Needs: No Transportation Needs (4/17/2024)    Transportation Needs     Lack of Transportation: No   Housing Stability: Low Risk  (4/17/2024)    Housing Stability     Housing Instability: No                                Physical Exam     ED Triage Vitals [04/24/25 0740]   /87   Pulse (!) 123   Resp 26   Temp 97.9 °F (36.6 °C)   Temp src Oral   SpO2 96 %   O2 Device None (Room air)       Current Vitals:   Vital Signs  BP: 160/87  Pulse: 104  Resp: 20  Temp: 97.9 °F (36.6 °C)  Temp src: Oral    Oxygen Therapy  SpO2: 96 %  O2 Device: None (Room air)  O2 Flow Rate (L/min): 1 L/min        Physical Exam  Constitutional:       General: Is not in acute distress.     Appearance: Is not ill-appearing.   HENT:      Head: Normocephalic and atraumatic.       Mouth/Throat:      Mouth: Mucous membranes are moist.   Eyes:      Conjunctiva/sclera: Conjunctivae normal.      Pupils: Pupils are equal, round, and reactive to light.   Cardiovascular:      Rate and Rhythm: Tachycardia, irregularly irregular  Pulmonary: Tachypnea, diminished right lower lobe lung sounds  Abdominal:      General: Abdomen is flat. There is no distension.      Tenderness: There is no abdominal tenderness.   Musculoskeletal:      Right lower leg: No edema.      Left lower leg: No edema.   Skin:     General: Skin is warm and dry.   Neurological:      General: No focal deficit present.      Mental Status: Is alert.     Physical Exam                ED Course     Labs Reviewed   CBC WITH DIFFERENTIAL WITH PLATELET - Abnormal; Notable for the following components:       Result Value    RBC 3.42 (*)     HGB 11.0 (*)     HCT 33.0 (*)     All other components within normal limits   COMP METABOLIC PANEL (14) - Abnormal; Notable for the following components:    Glucose 133 (*)     Sodium 135 (*)     Creatinine 1.23 (*)     eGFR-Cr 40 (*)     AST 34 (*)     All other components within normal limits   PRO BETA NATRIURETIC PEPTIDE - Abnormal; Notable for the following components:    Pro-Beta Natriuretic Peptide 2,973 (*)     All other components within normal limits   PROTHROMBIN TIME (PT) - Abnormal; Notable for the following components:    PT 16.6 (*)     INR 1.36 (*)     All other components within normal limits   PTT, ACTIVATED - Abnormal; Notable for the following components:    PTT 39.2 (*)     All other components within normal limits   TROPONIN I HIGH SENSITIVITY - Normal   RAPID SARS-COV-2 BY PCR - Normal   POCT FLU TEST - Normal    Narrative:     This assay is a rapid molecular in vitro test utilizing nucleic acid amplification of influenza A and B viral RNA.   RAINBOW DRAW LAVENDER   RAINBOW DRAW LIGHT GREEN   RAINBOW DRAW BLUE     EKG    Rate, intervals and axes as noted on EKG Report.  Rate:  123  Rhythm: Atrial fibrillation  Reading: No ST elevation or depression              Results                                 MDM      Considered all emergent etiologies, differential includes but is not limited to: CHF exacerbation, A-fib with RVR, PE, pneumonia     I have independently visualized the radiology images, my focus/limited interpretation: Chest x-ray suspicious for pulmonary     Defer to radiologist for other/incidental findings    EKG with atrial fibrillation with RVR, no signs of acute ischemia.  Labs notable for elevated BNP, troponin within normal limits.  Chest x-ray suspicious for pulmonary edema versus pneumonia.  Clinically patient has no symptoms of pneumonia, no fever, no cough, no leukocytosis so seemingly more consistent with CHF exacerbation.  Started on IV Lasix.  Given diltiazem 10 mg x 2 with improvement in her heart rate.  Largely rate controlled with occasional spikes into 110s.  Discussed with Dr. Azul for admission, [facility] for cardiology.     Admission disposition: 4/24/2025 10:49 AM           Medical Decision Making      Disposition and Plan     Clinical Impression:  1. Atrial fibrillation with RVR (HCC)    2. Acute on chronic congestive heart failure, unspecified heart failure type (HCC)         Disposition:  Admit  4/24/2025 10:49 am    Follow-up:  No follow-up provider specified.        Medications Prescribed:  Current Discharge Medication List          Supplementary Documentation:         Hospital Problems       Present on Admission           ICD-10-CM Noted POA    * (Principal) Atrial fibrillation with RVR (HCC) I48.91 4/24/2025 Unknown

## 2025-04-24 NOTE — H&P
[facility]     Giselle Weeks Patient Status:  Inpatient    1929 MRN [id number]   Location [facility] 0SW-A Attending Judith Azul,    Hosp Day # 0 PCP W[facility] Ward     Chief Complaint: SOB    Subjective:    History of Present Illness:     Giselle Weeks is a 89 year old female with history of chronic HFpEF (EF60-65%), afib on eliquis, CKD stage III, HTN, HLD presented with SOB and wheezing.     Patient is Talat speaking and has hearing deficit. History was obtained from chart review. Family not preset at the time of my evaluation. Patient with 1 day history of SOB, abdominal pain. Today she had progression of her symptoms prompting family to bring her to the ED.    In ED, patient with afib with RVR to 130s. Shew as hypoxic requiring 1L NC. CXR with focal ground glass airspace disease within R mid lung, diffuse interstitial opacities infection vs. Edema. She was given IV lasix. Cardiology was consulted.     History/Other:    Past Medical History:  Past Medical History[1]  Past Surgical History:   Past Surgical History[2]   Family History:   Family History[3]  Social History:    reports that she has never smoked. She has never used smokeless tobacco. She reports that she does not drink alcohol and does not use drugs.     Allergies: Allergies[4]    Medications:  Medications Ordered Prior to Encounter[5]    Review of Systems:   A comprehensive review of systems was completed.    Pertinent positives and negatives noted in the HPI.    Objective:   Physical Exam:    BP 98/49 (BP Location: Left arm)   Pulse 73   Temp 97.3 °F (36.3 °C) (Oral)   Resp 20   Ht 5' 2\" (1.575 m)   Wt 145 lb 15.1 oz (66.2 kg)   SpO2 100%   BMI 26.69 kg/m²   General: No acute distress, Alert  Respiratory: Expiratory wheezing  Cardiovascular: S1, S2. Regular rate and rhythm  Abdomen: Soft, Non-tender, non-distended, positive bowel sounds  Neuro: Hearing deficit limited exam  Extremities:  No edema    Results:    Labs:      Labs Last 24 Hours:    Recent Labs   Lab 04/24/25  0753   RBC 3.42*   HGB 11.0*   HCT 33.0*   MCV 96.5   MCH 32.2   MCHC 33.3   RDW 14.7   NEPRELIM 2.84   WBC 6.2   .0       Recent Labs   Lab 04/24/25  0753   *   BUN 13   CREATSERUM 1.23*   EGFRCR 40*   CA 9.5   ALB 4.0   *   K 3.8      CO2 23.0   ALKPHO 76   AST 34*   ALT 13   BILT 0.6   TP 7.2       Estimated Glomerular Filtration Rate: 40 mL/min/1.73m2 (A) (result from lab).    Lab Results   Component Value Date    INR 1.36 (H) 04/24/2025    INR 4.04 (H) 07/03/2024    INR 4.46 (H) 06/21/2024       Recent Labs   Lab 04/24/25  0753   TROPHS 9       Recent Labs   Lab 04/24/25  0753   PBNP 2,973*       Recent Labs   Lab 04/24/25  1639   PCT 0.09*       Imaging: Imaging data reviewed in Epic.    Assessment & Plan:      #SOB, wheezing   #Focal ground-glass disease in mid right lung, diffuse interstitial opacities on xray  Combination of HFpEF and ?infection?   -Check viral panel, pro calcitonin, urine strep, legionella  -IV lasix   -Empiric antibiotics for now     #Acute on chronic HFpEF  -Diuretics per cardiology     #Afib with RVR   -Cardiology following  -Eliquis, diltiazem 120 mg daily, increased coreg, IV lopressor as needed     #CKD stage III - Scr at baseline     #HTN  #HLD    Plan of care discussed with patient     Funmilayo Wiley MD    Supplementary Documentation:     The 21st Century Cures Act makes medical notes like these available to patients in the interest of transparency. Please be advised this is a medical document. Medical documents are intended to carry relevant information, facts as evident, and the clinical opinion of the practitioner. The medical note is intended as peer to peer communication and may appear blunt or direct. It is written in medical language and may contain abbreviations or verbiage that are unfamiliar.               **Certification      PHYSICIAN Certification of Need for  Inpatient Hospitalization - Initial Certification    Patient will require inpatient services that will reasonably be expected to span two midnight's based on the clinical documentation in H+P.   Based on patients current state of illness, I anticipate that, after discharge, patient will require TBD.                        [1]   Past Medical History:   Arrhythmia    Atrial fibrillation (HCC)    Cancer (HCC)    Congestive heart disease (HCC)    Essential hypertension   [2]   Past Surgical History:  Procedure Laterality Date    Breast surgery      Total abdom hysterectomy     [3] No family history on file.  [4]   Allergies  Allergen Reactions    Allopurinol OTHER (SEE COMMENTS)     Christian mederos    Penicillins OTHER (SEE COMMENTS)     unkown   [5]   No current facility-administered medications on file prior to encounter.     Current Outpatient Medications on File Prior to Encounter   Medication Sig Dispense Refill    carvedilol 3.125 MG Oral Tab Take 1 tablet (3.125 mg total) by mouth in the morning and 1 tablet (3.125 mg total) before bedtime.      dilTIAZem 120 MG Oral Tab Take 1 tablet (120 mg total) by mouth daily. 30 tablet 3    furosemide 20 MG Oral Tab Take 1 tablet (20 mg total) by mouth daily. 30 tablet 0    apixaban 5 MG Oral Tab Take 0.5 tablets (2.5 mg total) by mouth 2 (two) times daily. 60 tablet 0    aspirin EC 81 MG Oral Tab EC Take 1 tablet (81 mg total) by mouth.      Potassium Chloride ER 10 MEQ Oral Tab CR Take 1 tablet (10 mEq total) by mouth.      albuterol sulfate (2.5 MG/3ML) 0.083% Inhalation Nebu Soln Take by nebulization every 6 (six) hours as needed for Wheezing.      Ipratropium Bromide 0.06 % Nasal Solution 2 sprays by Nasal route 4 (four) times daily. 1 Bottle 11    TraMADol HCl 50 MG Oral Tab Take 1 tablet (50 mg total) by mouth. Takes as needed

## 2025-04-24 NOTE — CONSULTS
Cardiology Consultation      Giselle Weeks Patient Status:  Inpatient    1929 MRN [id number]   Location [facility] 0SW-A Attending Judith Azul, DO   Hosp Day # 0 PCP Wicho Ward     Reason for Consultation:  A-fib RVR, volume overload    History of Present Illness:  Giselle Weeks is a(n) 89 year old female with chronic medical conditions including chronic HFpEF, mitral valve regurgitation, tricuspid valve regurgitation, hypertension, hyperlipidemia, who presents with worsening shortness of breath and wheezing recently.  Patient is resting comfortably.  Family is at bedside who provides further history.  They state that she had previously received prednisone which seemed to help with her wheezing but this time they do not note any change in her current habitual status.  She has been compliant with her medications and they do not note that she has had increased fluid intake or sodium intake.  In the ER, she was noted to have A-fib with RVR as well as an x-ray noting concern for volume overload.  Cardiology asked to evaluate.    History:  Past Medical History[1]  Past Surgical History[2]  Family History[3]   reports that she has never smoked. She has never used smokeless tobacco. She reports that she does not drink alcohol and does not use drugs.    Allergies:  Allergies[4]    Medications:  Current Hospital Medications[5]    Review of Systems:  A comprehensive review of systems was negative if not otherwise mention in above HPI.    /62   Pulse 94   Temp 98 °F (36.7 °C)   Resp 22   Ht 5' 2\" (1.575 m)   Wt 145 lb 15.1 oz (66.2 kg)   SpO2 100%   BMI 26.69 kg/m²   Temp (24hrs), Av °F (36.7 °C), Min:97.9 °F (36.6 °C), Max:98 °F (36.7 °C)     No intake or output data in the 24 hours ending 25 1532  Wt Readings from Last 3 Encounters:   25 145 lb 15.1 oz (66.2 kg)   24 140 lb (63.5 kg)   24 140 lb (63.5 kg)       Physical Exam:   General: Alert, pleasant mood.   Nasal cannula in place.  No respiratory or constitutional distress.  HEENT: Normocephalic, anicteric sclera, neck supple.  Cardiac: Irregularly irregular, tachycardic.  Grade 2 systolic murmur.  Lungs: Mild wheezing.  Mildly coarse bilaterally.  Abdomen: Soft, non-tender. BS-present.  Extremities: Without clubbing, cyanosis.  No lower extremity edema.  Neurologic: Alert and oriented, normal affect.  Skin: Warm and dry.     Laboratory Data:  Lab Results   Component Value Date    WBC 6.2 04/24/2025    HGB 11.0 04/24/2025    HCT 33.0 04/24/2025    .0 04/24/2025    CREATSERUM 1.23 04/24/2025    BUN 13 04/24/2025     04/24/2025    K 3.8 04/24/2025     04/24/2025    CO2 23.0 04/24/2025     04/24/2025    CA 9.5 04/24/2025    ALB 4.0 04/24/2025    ALKPHO 76 04/24/2025    BILT 0.6 04/24/2025    TP 7.2 04/24/2025    AST 34 04/24/2025    ALT 13 04/24/2025    PTT 39.2 04/24/2025    INR 1.36 04/24/2025    PTP 16.6 04/24/2025       Imaging/results:  pBNP 2973  Troponin 9  CXR - Ground glass opacities noted in the mid right lung  EKG - afib rvr w/ aberrancy       Assessment:  Afib, rvr   Acute on chronic HFpEF exacerbation-possibly exacerbated by above  ECHO 4/2024 - LVEF 60-65%. Moderate MR. Moderate TR.   HTN  HLD      Plan:  Cont eliquis   Cont diltiazem 120 mg every day  Increase carvedilol to 6.25 mg twice daily.  IV Lopressor 5 mg every 4 hours as needed.  If sustained elevated rates, Cardizem infusion.  IV Lasix 20 mg twice daily  Recheck metabolic panel in a.m.  Further recs to follow        Thank you for allowing me to participate in the care of your patient.      Marco Dee DO  Cardiologist  [facility]  4/24/2025 3:32 PM      Note to the patient: The 21st Century Cures Act makes medical notes like these available to patients in the interest of transparency. However, be advised that this is a medical document. It is intended as peer to peer communication. It is written in  medical language and may contain abbreviations or verbiage that are unfamiliar. It may appear blunt or direct. Medical documents are intended to carry relevant information, facts as evident, and clinical opinion of the practitioner.     Disclaimer: Components of this note were documented using voice recognition system and are subject to errors not corrected at proofreading. Contact the author of this note for any clarifications.          [1]   Past Medical History:   Arrhythmia    Atrial fibrillation (HCC)    Cancer (HCC)    Congestive heart disease (HCC)    Essential hypertension   [2]   Past Surgical History:  Procedure Laterality Date    Breast surgery      Total abdom hysterectomy     [3] No family history on file.  [4]   Allergies  Allergen Reactions    Allopurinol OTHER (SEE COMMENTS)     Christian mederos    Penicillins OTHER (SEE COMMENTS)     unkown   [5]   Current Facility-Administered Medications:     albuterol (Ventolin) (2.5 MG/3ML) 0.083% nebulizer solution 2.5 mg, 2.5 mg, Nebulization, Q6H PRN    apixaban (Eliquis) tab 2.5 mg, 2.5 mg, Oral, BID    aspirin DR tab 81 mg, 81 mg, Oral, Daily    carvedilol (Coreg) tab 3.125 mg, 3.125 mg, Oral, BID with meals    dilTIAZem (cardIZEM) tab 120 mg, 120 mg, Oral, Daily    traMADol (Ultram) tab 50 mg, 50 mg, Oral, Q12H PRN    acetaminophen (Tylenol Extra Strength) tab 500 mg, 500 mg, Oral, Q4H PRN    melatonin tab 3 mg, 3 mg, Oral, Nightly PRN    polyethylene glycol (PEG 3350) (Miralax) 17 g oral packet 17 g, 17 g, Oral, Daily PRN    sennosides (Senokot) tab 17.2 mg, 17.2 mg, Oral, Nightly PRN    bisacodyl (Dulcolax) 10 MG rectal suppository 10 mg, 10 mg, Rectal, Daily PRN    ondansetron (Zofran) 4 MG/2ML injection 4 mg, 4 mg, Intravenous, Q6H PRN    metoclopramide (Reglan) 5 mg/mL injection 5 mg, 5 mg, Intravenous, Q8H PRN    benzonatate (Tessalon) cap 200 mg, 200 mg, Oral, TID PRN    guaiFENesin (Robitussin) 100 MG/5 ML oral liquid 200 mg, 200 mg, Oral, Q4H PRN     sodium chloride (Saline Mist) 0.65 % nasal solution 1 spray, 1 spray, Each Nare, Q3H PRN    glycerin-hypromellose- (Artificial Tears) 0.2-0.2-1 % ophthalmic solution 1 drop, 1 drop, Both Eyes, QID PRN

## 2025-04-25 LAB
ALBUMIN SERPL-MCNC: 3.6 G/DL (ref 3.2–4.8)
ALBUMIN/GLOB SERPL: 1.3 {RATIO} (ref 1–2)
ALP LIVER SERPL-CCNC: 72 U/L (ref 55–142)
ALT SERPL-CCNC: 11 U/L (ref 10–49)
ANION GAP SERPL CALC-SCNC: 9 MMOL/L (ref 0–18)
AST SERPL-CCNC: 21 U/L (ref ?–34)
BILIRUB SERPL-MCNC: 0.3 MG/DL (ref 0.2–0.9)
BUN BLD-MCNC: 17 MG/DL (ref 9–23)
CALCIUM BLD-MCNC: 9.2 MG/DL (ref 8.7–10.6)
CHLORIDE SERPL-SCNC: 103 MMOL/L (ref 98–112)
CO2 SERPL-SCNC: 29 MMOL/L (ref 21–32)
CREAT BLD-MCNC: 1.4 MG/DL (ref 0.55–1.02)
EGFRCR SERPLBLD CKD-EPI 2021: 35 ML/MIN/1.73M2 (ref 60–?)
ERYTHROCYTE [DISTWIDTH] IN BLOOD BY AUTOMATED COUNT: 14.4 %
GLOBULIN PLAS-MCNC: 2.8 G/DL (ref 2–3.5)
GLUCOSE BLD-MCNC: 113 MG/DL (ref 70–99)
HCT VFR BLD AUTO: 29.3 % (ref 35–48)
HGB BLD-MCNC: 9.6 G/DL (ref 12–16)
L PNEUMO AG UR QL: NEGATIVE
MAGNESIUM SERPL-MCNC: 1.7 MG/DL (ref 1.6–2.6)
MCH RBC QN AUTO: 32.2 PG (ref 26–34)
MCHC RBC AUTO-ENTMCNC: 32.8 G/DL (ref 31–37)
MCV RBC AUTO: 98.3 FL (ref 80–100)
OSMOLALITY SERPL CALC.SUM OF ELEC: 294 MOSM/KG (ref 275–295)
PHOSPHATE SERPL-MCNC: 4.5 MG/DL (ref 2.4–5.1)
PLATELET # BLD AUTO: 164 10(3)UL (ref 150–450)
POTASSIUM SERPL-SCNC: 3.6 MMOL/L (ref 3.5–5.1)
PROT SERPL-MCNC: 6.4 G/DL (ref 5.7–8.2)
Q-T INTERVAL: 350 MS
QRS DURATION: 120 MS
QTC CALCULATION (BEZET): 501 MS
R AXIS: -22 DEGREES
RBC # BLD AUTO: 2.98 X10(6)UL (ref 3.8–5.3)
SODIUM SERPL-SCNC: 141 MMOL/L (ref 136–145)
STREP PNEUMO ANTIGEN, URINE: NEGATIVE
T AXIS: 154 DEGREES
VENTRICULAR RATE: 123 BPM
WBC # BLD AUTO: 5.1 X10(3) UL (ref 4–11)

## 2025-04-25 PROCEDURE — 99233 SBSQ HOSP IP/OBS HIGH 50: CPT | Performed by: INTERNAL MEDICINE

## 2025-04-25 RX ORDER — CARVEDILOL 3.12 MG/1
3.12 TABLET ORAL 2 TIMES DAILY WITH MEALS
Status: DISCONTINUED | OUTPATIENT
Start: 2025-04-25 | End: 2025-04-30

## 2025-04-25 RX ORDER — MAGNESIUM OXIDE 400 MG/1
400 TABLET ORAL ONCE
Status: COMPLETED | OUTPATIENT
Start: 2025-04-25 | End: 2025-04-25

## 2025-04-25 NOTE — PHYSICAL THERAPY NOTE
PHYSICAL THERAPY EVALUATION - INPATIENT     Room Number: 0012/0012-A  Evaluation Date: 4/25/2025  Type of Evaluation: Initial  Physician Order: PT Eval and Treat    Presenting Problem: Atrial fibrillation with RVR  Co-Morbidities : HFpEF, mitral valve regurgitation, HTN, HLD  Reason for Therapy: Mobility Dysfunction and Discharge Planning    PHYSICAL THERAPY ASSESSMENT   Patient is a 89 year old female admitted 4/24/2025 for Atrial fibrillation with RVR.  Prior to admission, patient's baseline is Mod I.  Patient is currently functioning near baseline with bed mobility, transfers, and gait.  Patient is requiring contact guard assist and minimal assist as a result of the following impairments: decreased endurance/aerobic capacity, impaired Gait and standing balance, and decreased muscular endurance.  Physical Therapy will continue to follow for duration of hospitalization.    Patient will benefit from continued skilled PT Services at discharge to promote prior level of function.  Anticipate patient will return home with home health PT.    PLAN DURING HOSPITALIZATION  Nursing Mobility Recommendation : 1 Assist  PT Device Recommendation: Rolling walker  PT Treatment Plan: Bed mobility, Body mechanics, Gait training, Strengthening, Stair training, Balance training, Transfer training  Rehab Potential : Good  Frequency (Obs): 3-5x/week     CURRENT GOALS    Goal #1 Patient is able to demonstrate supine - sit EOB @ level: supervision     Goal #2 Patient is able to demonstrate transfers EOB to/from BSC at assistance level: supervision     Goal #3 Patient is able to ambulate 150 feet with assist device: walker - rolling at assistance level: supervision     Goal #4    Goal #5    Goal #6    Goal Comments: Goals established on 4/25/2025      PHYSICAL THERAPY MEDICAL/SOCIAL HISTORY  History related to current admission: Patient is a 89 year old female admitted on 4/24/2025 from Home for  Atrial fibrillation with RVR.      HOME  SITUATION  Type of Home: House  Home Layout: Two level, Able to live on main level                     Lives With: Daughter    Drives: No   Patient Regularly Uses: Hearing aides      Prior Level of [place]: Pt's dtr reported that pt stays with her, dtr, in a house. Pt is able to ambulate with no need of assistance . Pt is Mod I with her ADLs.     SUBJECTIVE  \" I am dizzy\"  Dtr assisted with translation    OBJECTIVE  Precautions:  needed  Fall Risk: High fall risk    WEIGHT BEARING RESTRICTION     PAIN ASSESSMENT  Ratin  Location: Pt reports no pain  Management Techniques: Activity promotion, Repositioning    COGNITION  Overall Cognitive Status:  WFL - within functional limits    RANGE OF MOTION AND STRENGTH ASSESSMENT  Upper extremity ROM and strength are within functional limits     Lower extremity ROM is within functional limits     Lower extremity strength is within functional limits     BALANCE  Static Sitting: Fair  Dynamic Sitting: Fair  Static Standing: Fair -  Dynamic Standing: Fair -    ADDITIONAL TESTS                                    ACTIVITY TOLERANCE                         O2 WALK       NEUROLOGICAL FINDINGS                        AM-PAC '6-Clicks' INPATIENT SHORT FORM - BASIC MOBILITY  How much difficulty does the patient currently have...  Patient Difficulty: Turning over in bed (including adjusting bedclothes, sheets and blankets)?: A Little   Patient Difficulty: Sitting down on and standing up from a chair with arms (e.g., wheelchair, bedside commode, etc.): A Little   Patient Difficulty: Moving from lying on back to sitting on the side of the bed?: A Little   How much help from another person does the patient currently need...   Help from Another: Moving to and from a bed to a chair (including a wheelchair)?: A Little   Help from Another: Need to walk in hospital room?: A Little   Help from Another: Climbing 3-5 steps with a railing?: A Little     AM-PAC Score:  Raw Score: 18    Approx Degree of Impairment: 46.58%   Standardized Score (AM-PAC Scale): 43.63   CMS Modifier (G-Code): CK    FUNCTIONAL ABILITY STATUS  Gait Assessment   Functional Mobility/Gait Assessment  Gait Assistance: Not tested    Skilled Therapy Provided     Bed Mobility:  Rolling: NT  Supine to sit: NT   Sit to supine: Min A     Transfer Mobility:  Sit to stand: Min A   Stand to sit: CGA  Gait = NT    Therapist's Comments: When standing pt reported feeling dizzy when standing. Pt was able to perform using the RW to take step from chair to bed, with CGA. RN was notified of the session.      Sitting (prior to session) 96/50(64)  Sittin/51(60)  Sittin/50(59)  Standin/39 (47)  Supine: 91/43 (60)     Due to pt being symptomatic pt was not appropriate to attempt to ambulate at this time.     Exercise/Education Provided:  Bed mobility  Body mechanics  Gait training  Transfer training    Patient End of Session: Up in chair, Needs met, Call light within reach, RN aware of session/findings, All patient questions and concerns addressed, Alarm set      Patient Evaluation Complexity Level:  History Moderate - 1 or 2 personal factors and/or co-morbidities   Examination of body systems Low -  addressing 1-2 elements   Clinical Presentation Low- Stable   Clinical Decision Making Low Complexity       PT Session Time: 23 minutes  Therapeutic Activity: 15 minutes

## 2025-04-25 NOTE — CM/SW NOTE
04/25/25 1600   CM/SW Referral Data   Referral Source Nurse   Reason for Referral Protocol order set   Specify order set Pneumonia   Informant EMR     SW received protocol order for HH eval. Pt is a 89 y/o female admitted with Atrial Fibrillation with RVR. Chart reviewed and Anticipated therapy need: Home with Home Healthcare  SW received VM from [facility] stated the agency provides caregiving services to pt but dtr is requesting HHC as well.     SW sent HH referrals in [facility]IN, choice list will be provided once available. SW will continue to follow.     SUBHASH Longoria  Discharge Planner

## 2025-04-25 NOTE — PROGRESS NOTES
[facility]   part of [facility]     Hospitalist Progress Note     Giselle Weeks Patient Status:  Inpatient    1929 MRN [id number]   Location [facility] 0-A Attending Judith Azul,    Hosp Day # 1 PCP Wicho Ward     Chief Complaint: SOB    Subjective:     Patient dizzy while working with therapy today morning. Rates better controlled. No acute complaints. Tolerating oral intake.     Objective:    Review of Systems:   A comprehensive review of systems was completed; pertinent positive and negatives stated in subjective.    Vital signs:  Temp:  [97.3 °F (36.3 °C)-98.3 °F (36.8 °C)] 98 °F (36.7 °C)  Pulse:  [] 93  Resp:  [20-22] 20  BP: ()/(49-75) 113/54  SpO2:  [98 %-100 %] 99 %    Physical Exam:    General: No acute distress  Respiratory: No wheezes, no rhonchi  Cardiovascular: S1, S2, regular rate and rhythm  Abdomen: Soft, Non-tender, non-distended, positive bowel sounds  Neuro: No new focal deficits.   Extremities: No edema    Diagnostic Data:    Labs:  Recent Labs   Lab 25  0753 25  0730   WBC 6.2 5.1   HGB 11.0* 9.6*   MCV 96.5 98.3   .0 164.0   INR 1.36*  --        Recent Labs   Lab 25  0753 25  0730   * 113*   BUN 13 17   CREATSERUM 1.23* 1.40*   CA 9.5 9.2   ALB 4.0 3.6   * 141   K 3.8 3.6    103   CO2 23.0 29.0   ALKPHO 76 72   AST 34* 21   ALT 13 11   BILT 0.6 0.3   TP 7.2 6.4       Estimated Glomerular Filtration Rate: 35 mL/min/1.73m2 (A) (result from lab).    Recent Labs   Lab 25  0753   TROPHS 9       Recent Labs   Lab 25  0753   PTP 16.6*   INR 1.36*                  Microbiology    No results found for this visit on 25.      Imaging: Reviewed in Epic.    Medications: Scheduled Medications[1]    Assessment & Plan:      #SOB, wheezing   #Focal ground-glass disease in mid right lung, diffuse interstitial opacities on xray  Suspect aucte CHF, and bronchospasm. Patient has a chronic dry cough  without recent changes. No other infectious symptoms per daughter. Viral panel negative. Low procal.   -Monitor off antibiotics tonight    #Acute on chronic HFpEF  Holding lasix for KHRIS     #KHRIS on CKD stage III- hold lasix. Repeat BMP in Am      #Afib with RVR   -Cardiology following  -Eliquis, diltiazem 120 mg daily, increased coreg, IV lopressor as needed      #HTN  #HLD    Funmilayo Wiley MD    Supplementary Documentation:     Quality:  DVT Mechanical Prophylaxis:     Early ambuation  DVT Pharmacologic Prophylaxis   Medication    apixaban (Eliquis) tab 2.5 mg         DVT Pharmacologic prophylaxis: Aspirin 81 mg      Code Status: Not on file  Quig[place]: External urinary catheter in place  Quigley Duration (in days):   Central line:    SHELLY:     Discharge is dependent on: course  At this point Ms. Weeks is expected to be discharge to: pending PTOT    The 21st Century Cures Act makes medical notes like these available to patients in the interest of transparency. Please be advised this is a medical document. Medical documents are intended to carry relevant information, facts as evident, and the clinical opinion of the practitioner. The medical note is intended as peer to peer communication and may appear blunt or direct. It is written in medical language and may contain abbreviations or verbiage that are unfamiliar.                         [1]    magnesium oxide  400 mg Oral Once    apixaban  2.5 mg Oral BID    aspirin  81 mg Oral Daily    dilTIAZem HCl  120 mg Oral Daily    carvedilol  6.25 mg Oral BID with meals    furosemide  20 mg Intravenous BID (Diuretic)    albuterol  2.5 mg Nebulization Q4H WA (5 times daily)    cefTRIAXone  2 g Intravenous Q24H    doxycycline  100 mg Oral BID

## 2025-04-25 NOTE — PROGRESS NOTES
Progress Note  Adalkirstyacacia Weeks Patient Status:  Inpatient    1929 MRN [id number]   Location [facility] 0SW-A Attending Judith Azul, DO   Hosp Day # 1 PCP Wicho Ward     Subjective:  No acute events overnight.  Sitting up in a chair, on O2 at 2L, still with some exertional dyspnea and intermittent wheezing. Denies any chest pain, palpitations or dizziness at this time.       Objective:  /54 (BP Location: Left arm)   Pulse 93   Temp 98 °F (36.7 °C) (Oral)   Resp 20   Ht 5' 2\" (1.575 m)   Wt 145 lb 15.1 oz (66.2 kg)   SpO2 99%   BMI 26.69 kg/m²     Telemetry: A-fib, HR 70s      Intake/Output:    Intake/Output Summary (Last 24 hours) at 2025 1056  Last data filed at 2025 1048  Gross per 24 hour   Intake 100 ml   Output 950 ml   Net -850 ml       Last 3 Weights   25 1405 145 lb 15.1 oz (66.2 kg)   25 0740 146 lb (66.2 kg)   24 1632 140 lb (63.5 kg)   24 1451 140 lb (63.5 kg)       Labs:  Recent Labs   Lab 25  0753 25  0730   * 113*   BUN 13 17   CREATSERUM 1.23* 1.40*   EGFRCR 40* 35*   CA 9.5 9.2   * 141   K 3.8 3.6    103   CO2 23.0 29.0     Recent Labs   Lab 25  0753 25  0730   RBC 3.42* 2.98*   HGB 11.0* 9.6*   HCT 33.0* 29.3*   MCV 96.5 98.3   MCH 32.2 32.2   MCHC 33.3 32.8   RDW 14.7 14.4   NEPRELIM 2.84  --    WBC 6.2 5.1   .0 164.0         Recent Labs   Lab 25  0753   TROPHS 9       Review of Systems   Constitutional: Negative.   Cardiovascular:  Positive for dyspnea on exertion and irregular heartbeat.   Respiratory: Negative.         Physical Exam:    Gen: alert, oriented x 3, NAD, [place]  Heent: pupils equal, reactive. Mucous membranes moist.   Neck: no jvd  Cardiac: irregular rate and rhythm, normal S1,S2, no murmur, gallop or rub   Lungs: fine bibasilar crackles, on O2 at 2L   Abd: soft, NT/ND +bs  Ext: no edema  Skin: Warm, dry  Neuro: No focal deficits        Medications:    Scheduled  Medications[1]  Medication Infusions[2]      Assessment:  A-fib with RVR-presented with incresed shortness of breath and wheezing   CXR with mild pulmonary edema, suspect pna of mid right lung  TSH wnl  On oral diltiazem, bb  On eliquis for stroke prevention   Acute on chronic HFpEF with exacerbation   proBNP 2,973  Echo 4/2024 LVEF 60-65%, mod MR and TR  Diuresing with IV lasix net fluid off 800 ml  On bb, lasix per GDMT  SOB, wheezing   Respiratory panel neg  Procal elevated   On abx, inhalers- per primary   HTN-controlled   HLP-on statin   CKD3, crt trending up     Plan:  Appears euvolemic, will stop IV lasix, not a significant response to diuresis, renal function trending up.  A-fib, rates controlled 70 to 90s-continue carvedilol, diltiazem.  Continue eliquis for stroke prevention.  Continue abx, nebs for suspect pna-per primary.    Plan of care discussed with patient, RN.    GALDINO Hamm  4/25/2025  10:56 AM  [phone]         Patient seen and examined independently.  Note reviewed and labs reviewed. Agree with above assessment and plan.  89-year-old female who presented with concerns for worsening shortness of breath.  Initially thought to be due to A-fib/RVR with volume overload.  However, there may be component of pneumonia.  Heart rates are improved after coreg dose was increased to 6.25 mg bid. Change to oral diuretics. Will follow.        Marco Dee DO  Cardiologist  [facility]  4/25/2025 2:18 PM      Note to the patient: The 21st Century Cures Act makes medical notes like these available to patients in the interest of transparency. However, be advised that this is a medical document. It is intended as peer to peer communication. It is written in medical language and may contain abbreviations or verbiage that are unfamiliar. It may appear blunt or direct. Medical documents are intended to carry relevant information, facts as evident, and clinical opinion of the  practitioner.     Disclaimer: Components of this note were documented using voice recognition system and are subject to errors not corrected at proofreading. Contact the author of this note for any clarifications.          [1]    magnesium oxide  400 mg Oral Once    apixaban  2.5 mg Oral BID    aspirin  81 mg Oral Daily    dilTIAZem HCl  120 mg Oral Daily    carvedilol  6.25 mg Oral BID with meals    furosemide  20 mg Intravenous BID (Diuretic)    albuterol  2.5 mg Nebulization Q4H WA (5 times daily)    cefTRIAXone  2 g Intravenous Q24H    doxycycline  100 mg Oral BID   [2]

## 2025-04-25 NOTE — PROGRESS NOTES
Heart Failure Nurse  Progress Note    Patient was evaluated by the Heart Failure Nurse  for understanding, verbalization, demonstration, and recall of education related to heart failure, overall adherence to the behaviors necessary to maintain a compensated status, and risk for readmission.     Patient assessment: met with patient and daughter in law, who was able to translate for patient    Patient is able to verbalize signs/symptoms fluid overload/impending HF exacerbation and who to contact with problems                                          _x__ yes  ___ no      Patient is following a 2000 mg sodium diet                                             ___ yes  __x_ no    If no, barriers to 2000 mg sodium diet:___ Giselle likes salt. Daughter in law limits sodium when cooking, however Giselle will add salt at the table. Family will try to encourage her ti use less, however want her to still enjoy her food.     Patient informed of 2-Part dietician classes that is free if sign up within 30 days of discharge or $40  ___ yes  __x_ no      Patient is adherent to medication regimen                                              ___ yes  __x_ no    If no, barriers to medication regimen: daughter in law says she fills her pill boxes, however, Giselle kept the lasix bottle in her room. Family not sure if she was taking daily- states she will start adding lasix to box herself to ensure she is taking daily.     Patient has sufficient funds to purchase medication                      __x_ yes  ___ no      Patient has a scale in the home              __x_ yes  ___ no      Patient is adherent to daily weight monitoring                                        ___ yes   ___x no    If no, barriers to daily weight monitoring:  will start monitoring daily and recording.     Symptom Tracker Worksheet reviewed with patient  __x_ yes   ___ no      Patient verbalizes understanding of stoplight/heart failure zones          __x_  yes   ___ no      Patient understands the importance of 7-day follow-up appointment      _x__ yes  ___ no    Appointment Date:  5-6-2625 11:40 AM DR [facility] IN [place] OFFICE          Patient has adequate transportation to attend follow-up appointments    __X_ yes  ___ no    If no, was referral to Social Work made  ___yes  _X__ no      Family/Friend present during education: Daughter in law [place]    Additional consultations required: none    Destiny Kemp RN    [id number]

## 2025-04-25 NOTE — PLAN OF CARE
Assumed care of patient @2330.  Patient currently sleeping.  Speaks very little english and can't hear  cher.  Family went home, communication consists of pointing.

## 2025-04-25 NOTE — DISCHARGE INSTRUCTIONS
Going Home Instructions  In this section you will find the tools which will guide you through the first few days after you leave the hospital. Continued use of these tools will help you develop the skills necessary to keep your heart failure under control.     Home Care Instructions Following Heart Failure - the most important things to do every day include:   Weigh yourself and review the “Self-Check Plan” sheet every morning.   Call your cardiologist office if you are in the “Pay Attention-Use Caution” (yellow zone) or “Medical Alert-Warning!” (red zone) as outlined in the Self-Check Plan sheet.  Take your medicines as prescribed.  Limit your sodium (salt) intake.  Know when to call your cardiologist, primary doctor, or nurse.  Know when to seek emergency care.      Things for You to Remember:   1. See your doctor or healthcare provider as written on your discharge instructions.  It is important that you attend this appointment to make sure your symptoms are under control.     2. Your recommended sodium intake is 2703-9179 mg daily.    3.  Weigh yourself every day.    4. Some exercise and activity is important to help keep your heart functioning and strong. Unless instructed not to exercise, you may walk at a slow to moderate pace for 10-15 minutes 2-3 days per week to start. Pace your activity to prevent shortness of breath or fatigue. Stop exercising if you develop chest pain, lightheadedness, or significant shortness of breath.       Call Your Cardiologist If:   You gain 2-3 pounds in one day or 5 pounds in one week.  You have more difficulty breathing.  You are getting more tired with normal activity.  You are more short of breath lying down, or awaken at night short of breath.  You have swelling of your feet or legs.  You urinate less often during the day and more often at night.  You have cramps in your legs.  You have blurred vision or see yellowish-green halos around objects of lights.    Go to the  Emergency Room If:   You have pain or tightness in your chest  You are extremely short of breath  You are coughing up pink-frothy mucus  You are traveling and develop symptoms of worsening heart failure      ** Please follow up with your cardiologist or Advanced Practice Provider as written on your discharge instructions. If you are not provided with an appointment, let your nurse know so you can get an appointment**  [facility] at discharge.   [phone]

## 2025-04-25 NOTE — OCCUPATIONAL THERAPY NOTE
OCCUPATIONAL THERAPY EVALUATION - INPATIENT    Room Number: 0012/0012-A  Evaluation Date: 2025     Type of Evaluation: Initial  Presenting Problem: Atrial Fibrillation  w/ RVR    Physician Order: IP Consult to Occupational Therapy  Reason for Therapy:  ADL/IADL Dysfunction and Discharge Planning    OCCUPATIONAL THERAPY ASSESSMENT   Patient is a 89 year old female admitted on 2025 with Presenting Problem: Atrial Fibrillation  w/ RVR. Co-Morbidities : HFpEF, mitral valve regurgitation, HTN, HLD  Patient is currently functioning near baseline with self-care and functional mobility.  Prior to admission, patient's baseline is supervision to mod A.  Patient met all OT goals at min to mod A level.  Patient reports no further questions/concerns at this time.     Patient will benefit from continued skilled OT Services at discharge to promote prior level of function and safety with additional support and return home with home health OT    FUNCTIONAL TRANSFER ASSESSMENT  Sit to Stand: Chair; Edge of Bed  Edge of Bed: Minimal Assist  Chair: Minimal Assist    BED MOBILITY  Rolling: Minimal Assist  Supine to Sit : Minimal Assist  Sit to Supine (OT): Minimal Assist  Scooting: min A    BALANCE ASSESSMENT  Static Sitting: Modified Independent  Static Standing: Minimal Assist    FUNCTIONAL ADL ASSESSMENT  Eating: Modified Independent  Grooming Seated: Supervision  LB Dressing Seated: Moderate Assist  Toileting Seated: Minimal Assist    ACTIVITY TOLERANCE:   Pulse: 66  Heart Rate Source: Monitor     Patient w/ c/o dizziness while sitting in chair;   BP:  Sitting (prior to session) 96/50(64)  Sittin/51(60)  Sittin/50(59)  Standin/39 (47)  Supine: 91/43 (60)     O2 SATURATIONS  Oxygen Therapy  SPO2% on Room Air at Rest: 92  SPO2% on Oxygen at Rest: 98  At rest oxygen flow (liters per minute): 2  SPO2% Ambulation on Room Air: 91  SPO2% Ambulation on Oxygen: 98  Ambulation oxygen flow (liters per minute):  2    COGNITION  Overall Cognitive Status:  Daughter assisted in translating.  Per daughter, patient is at baseline orientation and is aware of place, date, self and situation.  Patient is able to make needs known in her language.    Patient followed simple motor commands w/ visual cues.  Needs repetition d/t [facility]  COGNITION ASSESSMENTS     Upper Extremity:   ROM: within functional limits for age  Strength: is within functional limits for age  Coordination:  Gross motor: impaired  Fine motor: WFL  Sensation: light touch intact BUE; no c/o    EDUCATION PROVIDED  Patient Education : Role of Occupational Therapy; Plan of Care; Discharge Recommendations; Functional Transfer Techniques; Fall Prevention; Posture/Positioning; Edema Reduction; Energy Conservation; Proper Body Mechanics  Patient's Response to Education: Verbalized Understanding    Equipment used: RW  Demonstrates functional use    Therapist comments:    Patient End of Session: Up in chair, With  staff, Needs met, Call light within reach, RN aware of session/findings, All patient questions and concerns addressed, Alarm set, Discussed recommendations with /    OCCUPATIONAL PROFILE    HOME SITUATION  Type of Home: House  Home Layout: Two level, Able to live on main level  Lives With: Daughter    Toilet and Equipment: Standard height toilet, Grab bar  Shower/Tub and Equipment: Walk-in shower, Shower chair, Grab bar, Hand-held showerhead  Other Equipment:  (cane, walker)       Hand Dominance: Right  Drives: No  Patient Regularly Uses: Hearing aides    Prior Level of Function:  Per daughter, patient is able to get out of bed without assist most of the time, walks with a walker and is able to toilet, feed and groom without assist.  Patient has assistance on stairs to get into the home but does not have to go up/down any other stairs.  Patient requires assist from daughter for dressing and bathing. Daughter performs all other IADLs.       SUBJECTIVE  PAIN ASSESSMENT  Ratin  Location: denies       OBJECTIVE  Precautions:  needed  Fall Risk: High fall risk    WEIGHT BEARING RESTRICTION       AM-PAC ‘6-Clicks’ Inpatient Daily Activity Short Form  -   Putting on and taking off regular lower body clothing?: A Lot  -   Bathing (including washing, rinsing, drying)?: A Lot  -   Toileting, which includes using toilet, bedpan or urinal? : A Lot  -   Putting on and taking off regular upper body clothing?: A Little  -   Taking care of personal grooming such as brushing teeth?: A Little  -   Eating meals?: None    AM-PAC Score:  Score: 16  Approx Degree of Impairment: 53.32%  Standardized Score (AM-PAC Scale): 35.96    ADDITIONAL TESTS     NEUROLOGICAL FINDINGS      PLAN   Patient has been evaluated and presents with no skilled Occupational Therapy needs at this time.  Patient discharged from Occupational Therapy services.  Please re-order if a new functional limitation presents during this admission.    OT Device Recommendations: None    Patient Evaluation Complexity Level:   Occupational Profile/Medical History MODERATE - Expanded review of history including review of medical or therapy record   Specific performance deficits impacting engagement in ADL/IADL MODERATE  3 - 5 performance deficits   Client Assessment/Performance Deficits MODERATE - Comorbidities and min to mod modifications of tasks    Clinical Decision Making LOW - Analysis of occupational profile, problem-focused assessments, limited treatment options    Overall Complexity LOW     OT Session Time: 30 minutes  Self-Care Home Management: 15 minutes

## 2025-04-26 ENCOUNTER — APPOINTMENT (OUTPATIENT)
Dept: GENERAL RADIOLOGY | Facility: HOSPITAL | Age: OVER 89
End: 2025-04-26
Attending: INTERNAL MEDICINE
Payer: MEDICARE

## 2025-04-26 LAB
ANION GAP SERPL CALC-SCNC: 10 MMOL/L (ref 0–18)
BUN BLD-MCNC: 20 MG/DL (ref 9–23)
CALCIUM BLD-MCNC: 9.4 MG/DL (ref 8.7–10.6)
CHLORIDE SERPL-SCNC: 102 MMOL/L (ref 98–112)
CO2 SERPL-SCNC: 29 MMOL/L (ref 21–32)
CREAT BLD-MCNC: 1.44 MG/DL (ref 0.55–1.02)
EGFRCR SERPLBLD CKD-EPI 2021: 33 ML/MIN/1.73M2 (ref 60–?)
ERYTHROCYTE [DISTWIDTH] IN BLOOD BY AUTOMATED COUNT: 14.4 %
GLUCOSE BLD-MCNC: 116 MG/DL (ref 70–99)
HCT VFR BLD AUTO: 30 % (ref 35–48)
HGB BLD-MCNC: 9.5 G/DL (ref 12–16)
MAGNESIUM SERPL-MCNC: 1.7 MG/DL (ref 1.6–2.6)
MCH RBC QN AUTO: 31.3 PG (ref 26–34)
MCHC RBC AUTO-ENTMCNC: 31.7 G/DL (ref 31–37)
MCV RBC AUTO: 98.7 FL (ref 80–100)
OSMOLALITY SERPL CALC.SUM OF ELEC: 296 MOSM/KG (ref 275–295)
PLATELET # BLD AUTO: 157 10(3)UL (ref 150–450)
POTASSIUM SERPL-SCNC: 3.6 MMOL/L (ref 3.5–5.1)
RBC # BLD AUTO: 3.04 X10(6)UL (ref 3.8–5.3)
SODIUM SERPL-SCNC: 141 MMOL/L (ref 136–145)
WBC # BLD AUTO: 4.8 X10(3) UL (ref 4–11)

## 2025-04-26 PROCEDURE — 71045 X-RAY EXAM CHEST 1 VIEW: CPT | Performed by: INTERNAL MEDICINE

## 2025-04-26 PROCEDURE — 99232 SBSQ HOSP IP/OBS MODERATE 35: CPT | Performed by: INTERNAL MEDICINE

## 2025-04-26 RX ORDER — AZITHROMYCIN 250 MG/1
500 TABLET, FILM COATED ORAL
Status: COMPLETED | OUTPATIENT
Start: 2025-04-26 | End: 2025-04-28

## 2025-04-26 RX ORDER — ALBUTEROL SULFATE 0.83 MG/ML
2.5 SOLUTION RESPIRATORY (INHALATION)
Status: DISCONTINUED | OUTPATIENT
Start: 2025-04-26 | End: 2025-04-30

## 2025-04-26 RX ORDER — MAGNESIUM OXIDE 400 MG/1
400 TABLET ORAL ONCE
Status: COMPLETED | OUTPATIENT
Start: 2025-04-26 | End: 2025-04-26

## 2025-04-26 NOTE — PROGRESS NOTES
[facility]   part of [facility]     Hospitalist Progress Note     Giselle Wekes Patient Status:  Inpatient    1929 MRN [id number]   Location [facility] 0SW-A Attending Judith Azul,    Hosp Day # 2 PCP Wicho Ward     Chief Complaint: SOB    Subjective:     Wheezing noted today morning. Placed on 4 L NC. Per daughter, patient cough is now productive of yellow sputum.     Objective:    Review of Systems:   A comprehensive review of systems was completed; pertinent positive and negatives stated in subjective.    Vital signs:  Temp:  [97.6 °F (36.4 °C)-98.7 °F (37.1 °C)] 98.7 °F (37.1 °C)  Pulse:  [] 52  Resp:  [18-20] 19  BP: (102-116)/(44-63) 109/48  SpO2:  [91 %-100 %] 94 %    Physical Exam:    General: No acute distress  Respiratory: No wheezes, no rhonchi  Cardiovascular: S1, S2, regular rate and rhythm  Abdomen: Soft, Non-tender, non-distended, positive bowel sounds  Neuro: No new focal deficits.   Extremities: No edema    Diagnostic Data:    Labs:  Recent Labs   Lab 25  0753 25  0730 25  0738   WBC 6.2 5.1 4.8   HGB 11.0* 9.6* 9.5*   MCV 96.5 98.3 98.7   .0 164.0 157.0   INR 1.36*  --   --        Recent Labs   Lab 25  0753 25  0730 25  0738   * 113* 116*   BUN 13 17 20   CREATSERUM 1.23* 1.40* 1.44*   CA 9.5 9.2 9.4   ALB 4.0 3.6  --    * 141 141   K 3.8 3.6 3.6    103 102   CO2 23.0 29.0 29.0   ALKPHO 76 72  --    AST 34* 21  --    ALT 13 11  --    BILT 0.6 0.3  --    TP 7.2 6.4  --        Estimated Glomerular Filtration Rate: 33 mL/min/1.73m2 (A) (result from lab).    Recent Labs   Lab 25  0753   TROPHS 9       Recent Labs   Lab 25  0753   PTP 16.6*   INR 1.36*                  Microbiology    Hospital Encounter on 25   1. Blood Culture     Status: None (Preliminary result)    Collection Time: 25  7:44 PM    Specimen: Blood,peripheral   Result Value Ref Range    Blood Culture Result No  Growth 1 Day N/A         Imaging: Reviewed in Epic.    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 Patient has a chronic dry cough without recent changes. No other infectious symptoms per daughter. Viral panel negative. Low procal.   -Restart antibiotics   -CXR    #Acute on chronic HFpEF  Holding lasix for KHRIS     #KHRIS on CKD stage III- hold lasix. Repeat BMP in Am      #Afib with RVR  - improved  -Cardiology following  -Eliquis, diltiazem 120 mg daily, coreg, IV lopressor as needed      #HTN  #HLD    Funmilayo Wiley MD    Supplementary Documentation:     Quality:  DVT Mechanical Prophylaxis:     Early ambuation  DVT Pharmacologic Prophylaxis   Medication    apixaban (Eliquis) tab 2.5 mg         DVT Pharmacologic prophylaxis: Aspirin 81 mg      Code Status: DNAR/Selective Treatment  Quigley: External urinary catheter in place  Quigley Duration (in days):   Central line:    SHELLY:     Discharge is dependent on: course  At this point Ms. Weeks is expected to be discharge to: pending PTOT    The 21st Century Cures Act makes medical notes like these available to patients in the interest of transparency. Please be advised this is a medical document. Medical documents are intended to carry relevant information, facts as evident, and the clinical opinion of the practitioner. The medical note is intended as peer to peer communication and may appear blunt or direct. It is written in medical language and may contain abbreviations or verbiage that are unfamiliar.                         [1]    albuterol  2.5 mg Nebulization QID    carvedilol  3.125 mg Oral BID with meals    apixaban  2.5 mg Oral BID    aspirin  81 mg Oral Daily    dilTIAZem HCl  120 mg Oral Daily

## 2025-04-26 NOTE — PROGRESS NOTES
Assumed care at 0730  Patient Alert, awake, on 4L NC, received report patient desaturated on room air, re-started on oxygen-nasal cannula at 4LNC  No fevers, vomiting, or pain present  Assessment performed with help of daughter in law at bedside  Patient in no distress, able to hear upper respiratory wheezing  Weaned off oxygen supplementation, currently at 1L NC  Continuing nebulizer every four hours  Noted clear sputum on tissues, productive cough present  Rocephin and Azythromycin started  Chest X-ray done at bedside  Encouraged IS   Ambulated patient to bathroom, voided, then to chair  Transferred back to bed  Zofran given, Robitussin given prn  Daughter in law at bedside  Will continue plan of care

## 2025-04-26 NOTE — PLAN OF CARE
Received patient dozing, awakened easily. Hard of hearing even with hearing aids, unable to use translation line r/t hearing difficulties. Placed on O2 at 2L, neb treatment given per RT. Breath sounds with expiratory wheezing after tx. On tele, A fib. No facial grimacing or moaning noted.

## 2025-04-27 ENCOUNTER — APPOINTMENT (OUTPATIENT)
Dept: CT IMAGING | Facility: HOSPITAL | Age: OVER 89
End: 2025-04-27
Attending: INTERNAL MEDICINE
Payer: MEDICARE

## 2025-04-27 LAB
ANION GAP SERPL CALC-SCNC: 8 MMOL/L (ref 0–18)
BUN BLD-MCNC: 18 MG/DL (ref 9–23)
CALCIUM BLD-MCNC: 9.4 MG/DL (ref 8.7–10.6)
CHLORIDE SERPL-SCNC: 103 MMOL/L (ref 98–112)
CO2 SERPL-SCNC: 24 MMOL/L (ref 21–32)
CREAT BLD-MCNC: 1.22 MG/DL (ref 0.55–1.02)
EGFRCR SERPLBLD CKD-EPI 2021: 41 ML/MIN/1.73M2 (ref 60–?)
GLUCOSE BLD-MCNC: 116 MG/DL (ref 70–99)
MAGNESIUM SERPL-MCNC: 1.8 MG/DL (ref 1.6–2.6)
OSMOLALITY SERPL CALC.SUM OF ELEC: 283 MOSM/KG (ref 275–295)
POTASSIUM SERPL-SCNC: 3.8 MMOL/L (ref 3.5–5.1)
SODIUM SERPL-SCNC: 135 MMOL/L (ref 136–145)

## 2025-04-27 PROCEDURE — 99232 SBSQ HOSP IP/OBS MODERATE 35: CPT | Performed by: INTERNAL MEDICINE

## 2025-04-27 PROCEDURE — 71250 CT THORAX DX C-: CPT | Performed by: INTERNAL MEDICINE

## 2025-04-27 RX ORDER — MAGNESIUM OXIDE 400 MG/1
400 TABLET ORAL ONCE
Status: COMPLETED | OUTPATIENT
Start: 2025-04-27 | End: 2025-04-27

## 2025-04-27 RX ORDER — POTASSIUM CHLORIDE 1500 MG/1
40 TABLET, EXTENDED RELEASE ORAL ONCE
Status: COMPLETED | OUTPATIENT
Start: 2025-04-27 | End: 2025-04-27

## 2025-04-27 RX ORDER — FUROSEMIDE 20 MG/1
20 TABLET ORAL DAILY
Status: DISCONTINUED | OUTPATIENT
Start: 2025-04-27 | End: 2025-04-30

## 2025-04-27 NOTE — CM/SW NOTE
CM provided patient's RN with home health agency list for patient review.  SHILPA/SW to follow up for HH agency choice prior to discharge.      Trini Romeo RN Case Manager [id number]

## 2025-04-27 NOTE — PLAN OF CARE
Received pt alert x 4.   On room air.   On tele, VSS  Antibiodics per order  Pt appears weak  Purewick in place, appetite poor.  Bed in low position,  Call lightl within reach  Family at bedside.  Transfer placed , going to CTU 8

## 2025-04-27 NOTE — PROGRESS NOTES
[facility]   part of [facility]     Hospitalist Progress Note     Giselle Weeks Patient Status:  Inpatient    1929 MRN [id number]   Location [facility] 0SW-A Attending Judith Azul,    Hosp Day # 3 PCP Wicho Ward     Chief Complaint: SOB    Subjective:     Patient with SOB and cough in the afternoon. Poor appetite.     Objective:    Review of Systems:   A comprehensive review of systems was completed; pertinent positive and negatives stated in subjective.    Vital signs:  Temp:  [97.8 °F (36.6 °C)-98.9 °F (37.2 °C)] 98.1 °F (36.7 °C)  Pulse:  [] 109  Resp:  [16-22] 18  BP: (100-116)/(48-75) 112/75  SpO2:  [93 %-99 %] 99 %    Physical Exam:    General: No acute distress  Respiratory: expiratory wheezing in the posterior lung fields  Cardiovascular: S1, S2, regular rate and rhythm  Abdomen: Soft, Non-tender, non-distended, positive bowel sounds  Neuro: No new focal deficits.   Extremities: No edema    Diagnostic Data:    Labs:  Recent Labs   Lab 25  0753 25  0730 25  0738   WBC 6.2 5.1 4.8   HGB 11.0* 9.6* 9.5*   MCV 96.5 98.3 98.7   .0 164.0 157.0   INR 1.36*  --   --        Recent Labs   Lab 25  0753 25  0730 25  0738   * 113* 116*   BUN 13 17 20   CREATSERUM 1.23* 1.40* 1.44*   CA 9.5 9.2 9.4   ALB 4.0 3.6  --    * 141 141   K 3.8 3.6 3.6    103 102   CO2 23.0 29.0 29.0   ALKPHO 76 72  --    AST 34* 21  --    ALT 13 11  --    BILT 0.6 0.3  --    TP 7.2 6.4  --        Estimated Glomerular Filtration Rate: 33 mL/min/1.73m2 (A) (result from lab).    Recent Labs   Lab 25  0753   TROPHS 9       Recent Labs   Lab 25  0753   PTP 16.6*   INR 1.36*                  Microbiology    Hospital Encounter on 25   1. Blood Culture     Status: None (Preliminary result)    Collection Time: 25  7:44 PM    Specimen: Blood,peripheral   Result Value Ref Range    Blood Culture Result No Growth 2 Days N/A          Imaging: Reviewed in Epic.    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 Patient has a chronic dry cough. No other infectious symptoms per daughter. Viral panel negative. Low procal. Repeat CXR 4/26 with worsening vascular congestion. Patient also with cough newly productive of yellow sputum.  -Added antibiotics. Patient is not improving as hoped which could be due to age. Discussed CT chest discussed with daughter to evaluate R mid lung ground glass disease which has been present on prior imaging. CT chest pending    #Acute on chronic HFpEF  Restart home lasix 20 mg daily     #KHRIS on CKD stage III- hold lasix. Repeat BMP in Am      #Afib with RVR  - improved  -Cardiology following  -Eliquis, diltiazem 120 mg daily, coreg, IV lopressor as needed      #HTN  #HLD    Funmilayo Wiley MD    Supplementary Documentation:     Quality:  DVT Mechanical Prophylaxis:     Early ambuation  DVT Pharmacologic Prophylaxis   Medication    apixaban (Eliquis) tab 2.5 mg         DVT Pharmacologic prophylaxis: Aspirin 81 mg      Code Status: DNAR/Selective Treatment  Quigley: External urinary catheter in place  Quigley Duration (in days):   Central line:    SHELLY:     Discharge is dependent on: course  At this point Ms. Weeks is expected to be discharge to: pending PTOT    The 21st Century Cures Act makes medical notes like these available to patients in the interest of transparency. Please be advised this is a medical document. Medical documents are intended to carry relevant information, facts as evident, and the clinical opinion of the practitioner. The medical note is intended as peer to peer communication and may appear blunt or direct. It is written in medical language  and may contain abbreviations or verbiage that are unfamiliar.                         [1]    albuterol  2.5 mg Nebulization QID    cefTRIAXone  2 g Intravenous Q24H    azithromycin  500 mg Oral Daily    carvedilol  3.125 mg Oral BID with meals    apixaban  2.5 mg Oral BID    aspirin  81 mg Oral Daily    dilTIAZem HCl  120 mg Oral Daily

## 2025-04-27 NOTE — PLAN OF CARE
Denies c/o pain, malaise, or cardiac symptoms.  Remains in AFIB, HRs well controlled in the 70s-80s with a widened QRS.  Lungs with some wheezing and crackles noted.  1 L of O2 simply for comfort.  O2 saturations are fine without it.  Has a productive cough.  Abdomen soft and non tender to touch with active bowel sounds in all four quadrants.  Tolerating all medications well with no adverse effects.  Some slight pitting edema noted in the left ankle.  Plans for antibiotics.  All needs met by staff.        Problem: SAFETY ADULT - FALL  Goal: Free from fall injury  Description: INTERVENTIONS:- Assess pt frequently for physical needs- Identify cognitive and physical deficits and behaviors that affect risk of falls.- [place] fall precautions as indicated by assessment.- Educate pt/family on patient safety including physical limitations- Instruct pt to call for assistance with activity based on assessment- Modify environment to reduce risk of injury- Provide assistive devices as appropriate- Consider OT/PT consult to assist with strengthening/mobility- Encourage toileting schedule  Outcome: Progressing     Problem: Patient/Family Goals  Goal: Patient/Family Long Term Goal  Description: Patient's Long Term Goal: Go home with adequate resources  Interventions:-SW for home needs  -PT/OT  -follow up care  -medicines   See additional Care Plan goals for specific interventions  Outcome: Progressing  Goal: Patient/Family Short Term Goal  Description: Patient's Short Term Goal:Interventions: be comfortable  -medicines as needed  -O2 for comfort  -needs attended  - See additional Care Plan goals for specific interventions  Outcome: Progressing     Problem: CARDIOVASCULAR - ADULT  Goal: Maintains optimal cardiac output and hemodynamic stability  Description: INTERVENTIONS:- Monitor vital signs, rhythm, and trends- Monitor for bleeding, hypotension and signs of decreased cardiac output- Evaluate effectiveness of vasoactive  medications to optimize hemodynamic stability- Monitor arterial and/or venous puncture sites for bleeding and/or hematoma- Assess quality of pulses, skin color and temperature- Assess for signs of decreased coronary artery perfusion - ex. Angina- Evaluate fluid balance, assess for edema, trend weights  Outcome: Progressing  Goal: Absence of cardiac arrhythmias or at baseline  Description: INTERVENTIONS:- Continuous cardiac monitoring, monitor vital signs, obtain 12 lead EKG if indicated- Evaluate effectiveness of antiarrhythmic and heart rate control medications as ordered- Initiate emergency measures for life threatening arrhythmias- Monitor electrolytes and administer replacement therapy as ordered  Outcome: Progressing     Problem: RESPIRATORY - ADULT  Goal: Achieves optimal ventilation and oxygenation  Description: INTERVENTIONS:- Assess for changes in respiratory status- Assess for changes in mentation and behavior- Position to facilitate oxygenation and minimize respiratory effort- Oxygen supplementation based on oxygen saturation or ABGs- Provide Smoking Cessation handout, if applicable- Encourage broncho-pulmonary hygiene including cough, deep breathe, Incentive Spirometry- Assess the need for suctioning and perform as needed- Assess and instruct to report SOB or any respiratory difficulty- Respiratory Therapy support as indicated- Manage/alleviate anxiety- Monitor for signs/symptoms of CO2 retention  Outcome: Not Progressing

## 2025-04-27 NOTE — PLAN OF CARE
Assumed care at 1930  Received pt on O2 1L/NC sats >95%  Occasional nonproductive cough.  Denies pain.  Afib on tele HR controlled.  On Antibiotics.  Discharge planning.  Problem: SAFETY ADULT - FALL  Goal: Free from fall injury  Description: INTERVENTIONS:- Assess pt frequently for physical needs- Identify cognitive and physical deficits and behaviors that affect risk of falls.- [place] fall precautions as indicated by assessment.- Educate pt/family on patient safety including physical limitations- Instruct pt to call for assistance with activity based on assessment- Modify environment to reduce risk of injury- Provide assistive devices as appropriate- Consider OT/PT consult to assist with strengthening/mobility- Encourage toileting schedule  Outcome: Progressing     Problem: Patient/Family Goals  Goal: Patient/Family Long Term Goal  Description: Patient's Long Term Goal: Go home with adequate resources  Interventions:-SW for home needs  -PT/OT  -follow up care  -medicines   See additional Care Plan goals for specific interventions  Outcome: Progressing  Goal: Patient/Family Short Term Goal  Description: Patient's Short Term Goal:Interventions: be comfortable  -medicines as needed  -O2 for comfort  -needs attended  - See additional Care Plan goals for specific interventions  Outcome: Progressing     Problem: CARDIOVASCULAR - ADULT  Goal: Maintains optimal cardiac output and hemodynamic stability  Description: INTERVENTIONS:- Monitor vital signs, rhythm, and trends- Monitor for bleeding, hypotension and signs of decreased cardiac output- Evaluate effectiveness of vasoactive medications to optimize hemodynamic stability- Monitor arterial and/or venous puncture sites for bleeding and/or hematoma- Assess quality of pulses, skin color and temperature- Assess for signs of decreased coronary artery perfusion - ex. Angina- Evaluate fluid balance, assess for edema, trend weights  Outcome: Progressing  Goal: Absence of  cardiac arrhythmias or at baseline  Description: INTERVENTIONS:- Continuous cardiac monitoring, monitor vital signs, obtain 12 lead EKG if indicated- Evaluate effectiveness of antiarrhythmic and heart rate control medications as ordered- Initiate emergency measures for life threatening arrhythmias- Monitor electrolytes and administer replacement therapy as ordered  Outcome: Progressing     Problem: RESPIRATORY - ADULT  Goal: Achieves optimal ventilation and oxygenation  Description: INTERVENTIONS:- Assess for changes in respiratory status- Assess for changes in mentation and behavior- Position to facilitate oxygenation and minimize respiratory effort- Oxygen supplementation based on oxygen saturation or ABGs- Provide Smoking Cessation handout, if applicable- Encourage broncho-pulmonary hygiene including cough, deep breathe, Incentive Spirometry- Assess the need for suctioning and perform as needed- Assess and instruct to report SOB or any respiratory difficulty- Respiratory Therapy support as indicated- Manage/alleviate anxiety- Monitor for signs/symptoms of CO2 retention  Outcome: Progressing

## 2025-04-27 NOTE — PROGRESS NOTES
04/27/25 1418 04/27/25 1420 04/27/25 1422   Vital Signs   /55 112/58 115/58   BP Location Left arm Left arm Left arm   BP Method Automatic Automatic Automatic   Patient Position Lying Sitting Standing     Received her as a transfer from [facility].  Orthostatic bps are negative.  Skin check with Idris was negative.  No breakdown or open wounds noted.

## 2025-04-28 LAB
ANION GAP SERPL CALC-SCNC: 6 MMOL/L (ref 0–18)
BUN BLD-MCNC: 21 MG/DL (ref 9–23)
CALCIUM BLD-MCNC: 9.3 MG/DL (ref 8.7–10.6)
CHLORIDE SERPL-SCNC: 102 MMOL/L (ref 98–112)
CO2 SERPL-SCNC: 29 MMOL/L (ref 21–32)
CREAT BLD-MCNC: 1.29 MG/DL (ref 0.55–1.02)
EGFRCR SERPLBLD CKD-EPI 2021: 38 ML/MIN/1.73M2 (ref 60–?)
GLUCOSE BLD-MCNC: 122 MG/DL (ref 70–99)
MAGNESIUM SERPL-MCNC: 1.8 MG/DL (ref 1.6–2.6)
OSMOLALITY SERPL CALC.SUM OF ELEC: 288 MOSM/KG (ref 275–295)
POTASSIUM SERPL-SCNC: 4 MMOL/L (ref 3.5–5.1)
POTASSIUM SERPL-SCNC: 4 MMOL/L (ref 3.5–5.1)
SODIUM SERPL-SCNC: 137 MMOL/L (ref 136–145)

## 2025-04-28 PROCEDURE — 99232 SBSQ HOSP IP/OBS MODERATE 35: CPT | Performed by: INTERNAL MEDICINE

## 2025-04-28 PROCEDURE — 99223 1ST HOSP IP/OBS HIGH 75: CPT | Performed by: INTERNAL MEDICINE

## 2025-04-28 RX ORDER — MAGNESIUM OXIDE 400 MG/1
400 TABLET ORAL ONCE
Status: COMPLETED | OUTPATIENT
Start: 2025-04-28 | End: 2025-04-28

## 2025-04-28 RX ORDER — METHYLPREDNISOLONE SODIUM SUCCINATE 125 MG/2ML
60 INJECTION INTRAMUSCULAR; INTRAVENOUS EVERY 8 HOURS
Status: DISCONTINUED | OUTPATIENT
Start: 2025-04-28 | End: 2025-04-29

## 2025-04-28 NOTE — HOME CARE LIAISON
Spoke with Luna at Sylvia [facility] office. 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 Informed SHILPA Rees.

## 2025-04-28 NOTE — PROGRESS NOTES
[facility]   part of [facility]     Hospitalist Progress Note     Giselle Weeks Patient Status:  Inpatient    1929 MRN [id number]   Location [facility] 0-A Attending Judith Azul,    Hosp Day # 4 PCP Wicho Ward     Chief Complaint: SOB    Subjective:     On room air. Patient without acute complaints.     Objective:    Review of Systems:   A comprehensive review of systems was completed; pertinent positive and negatives stated in subjective.    Vital signs:  Temp:  [97.4 °F (36.3 °C)-98.4 °F (36.9 °C)] 97.4 °F (36.3 °C)  Pulse:  [] 79  Resp:  [16-20] 16  BP: ()/(55-78) 114/70  SpO2:  [92 %-100 %] 94 %    Physical Exam:    General: No acute distress  Respiratory: expiratory wheezing in the posterior lung fields  Cardiovascular: S1, S2, regular rate and rhythm  Abdomen: Soft, Non-tender, non-distended, positive bowel sounds  Neuro: No new focal deficits.   Extremities: No edema    Diagnostic Data:    Labs:  Recent Labs   Lab 25  0753 25  0730 25  0738   WBC 6.2 5.1 4.8   HGB 11.0* 9.6* 9.5*   MCV 96.5 98.3 98.7   .0 164.0 157.0   INR 1.36*  --   --        Recent Labs   Lab 25  0753 25  0730 25  0738 25  0311 25  0448   * 113* 116* 116* 122*   BUN 13 17 20 18 21   CREATSERUM 1.23* 1.40* 1.44* 1.22* 1.29*   CA 9.5 9.2 9.4 9.4 9.3   ALB 4.0 3.6  --   --   --    * 141 141 135* 137   K 3.8 3.6 3.6 3.8 4.0  4.0    103 102 103 102   CO2 23.0 29.0 29.0 24.0 29.0   ALKPHO 76 72  --   --   --    AST 34* 21  --   --   --    ALT 13 11  --   --   --    BILT 0.6 0.3  --   --   --    TP 7.2 6.4  --   --   --        Estimated Glomerular Filtration Rate: 38 mL/min/1.73m2 (A) (result from lab).    Recent Labs   Lab 25  0753   TROPHS 9       Recent Labs   Lab 25  0753   PTP 16.6*   INR 1.36*                  Microbiology    Hospital Encounter on 25   1. Blood Culture     Status: None (Preliminary  result)    Collection Time: 04/24/25  7:44 PM    Specimen: Blood,peripheral   Result Value Ref Range    Blood Culture Result No Growth 3 Days N/A         Imaging: Reviewed in Epic.    Medications: Scheduled Medications[1]    Assessment & Plan:      #Acute hypoxic respiratory failure   Suspect PNA/fluid. Resolved.     #Bronchospasm  #B/l pleural effusions   #Inflammatory ground glass opacities and consolidations worse in RUL  #Narrowed appearance of trachea and mainstem bronchi   Patient has a chronic dry cough. No other infectious symptoms per daughter. Viral panel negative. Low procal. Repeat CXR 4/26 with worsening vascular congestion. Patient also with cough newly productive of yellow sputum.  -Continue antibiotics  -Consult pulm to discuss how to optimize patient's symptoms for discharge    #Acute on chronic HFpEF  -lasix 20 mg daily     #KHRIS on CKD stage III-stable      #Afib with RVR  - improved  -Cardiology following  -Eliquis, diltiazem 120 mg daily, coreg, IV lopressor as needed     #Normocytic anemia - trend      #HTN  #HLD    Funmilayo Wiley MD    Supplementary Documentation:     Quality:  DVT Mechanical Prophylaxis:     Early ambuation  DVT Pharmacologic Prophylaxis   Medication    apixaban (Eliquis) tab 2.5 mg         DVT Pharmacologic prophylaxis: Aspirin 81 mg      Code Status: DNAR/Selective Treatment  Quigley: External urinary catheter in place  Quigley Duration (in days):   Central line:    SHELLY: 4/28/2025    Discharge is dependent on: course  At this point Ms. Weeks is expected to be discharge to: pending PTOT    The 21st Century Cures Act makes medical notes like these available to patients in the interest of transparency. Please be advised this is a medical document. Medical documents are intended to carry relevant information, facts as evident, and the clinical opinion of the practitioner. The medical note is intended as peer to peer communication and may appear blunt or direct. It is written in  medical language and may contain abbreviations or verbiage that are unfamiliar.                         [1]    furosemide  20 mg Oral Daily    albuterol  2.5 mg Nebulization QID    cefTRIAXone  2 g Intravenous Q24H    carvedilol  3.125 mg Oral BID with meals    apixaban  2.5 mg Oral BID    aspirin  81 mg Oral Daily    dilTIAZem HCl  120 mg Oral Daily

## 2025-04-28 NOTE — CM/SW NOTE
Attempted to meet with pt and family member to get HH choice, but family member was on the phone. Will follow back.     Negrita EVANGELISTA, LCSW  Discharge Planner

## 2025-04-28 NOTE — PLAN OF CARE
Assumed patient care around 0730 this AM. Patient alert and oriented x4. Spo2 maintained on RA. Afib on tele, HR maintained between 90's-100's. On Eliquis. Patient continent of bowel, incontinent of bladder at times, purewick in place. Denies pain at this time. Patient is up with moderate assist and walker in the room. Updated on POC. Needs met.         Problem: SAFETY ADULT - FALL  Goal: Free from fall injury  Description: INTERVENTIONS:- Assess pt frequently for physical needs- Identify cognitive and physical deficits and behaviors that affect risk of falls.- [place] fall precautions as indicated by assessment.- Educate pt/family on patient safety including physical limitations- Instruct pt to call for assistance with activity based on assessment- Modify environment to reduce risk of injury- Provide assistive devices as appropriate- Consider OT/PT consult to assist with strengthening/mobility- Encourage toileting schedule  Outcome: Progressing     Problem: Patient/Family Goals  Goal: Patient/Family Long Term Goal  Description: Patient's Long Term Goal: Go home with adequate resources  4/28: Stay out of the hospital when discharged     Interventions:-SW for home needs  -PT/OT  -follow up care  -medicines  4/28:  -Attend follow up appointments as needed  -Follow medication regimen at home    See additional Care Plan goals for specific interventions  Outcome: Progressing  Goal: Patient/Family Short Term Goal  Description: Patient's Short Term Goal:Interventions: be comfortable  4/28: To go home    -medicines as needed  -O2 for comfort  -needs attended  4/28:  -Tele monitoring  -Monitor labs  -IV antibiotics   -PO azithromycin   -Pulmonology to see   -Pain management PRN  - See additional Care Plan goals for specific interventions  Outcome: Progressing     Problem: CARDIOVASCULAR - ADULT  Goal: Maintains optimal cardiac output and hemodynamic stability  Description: INTERVENTIONS:- Monitor vital signs, rhythm, and  trends- Monitor for bleeding, hypotension and signs of decreased cardiac output- Evaluate effectiveness of vasoactive medications to optimize hemodynamic stability- Monitor arterial and/or venous puncture sites for bleeding and/or hematoma- Assess quality of pulses, skin color and temperature- Assess for signs of decreased coronary artery perfusion - ex. Angina- Evaluate fluid balance, assess for edema, trend weights  Outcome: Progressing  Goal: Absence of cardiac arrhythmias or at baseline  Description: INTERVENTIONS:- Continuous cardiac monitoring, monitor vital signs, obtain 12 lead EKG if indicated- Evaluate effectiveness of antiarrhythmic and heart rate control medications as ordered- Initiate emergency measures for life threatening arrhythmias- Monitor electrolytes and administer replacement therapy as ordered  Outcome: Progressing     Problem: RESPIRATORY - ADULT  Goal: Achieves optimal ventilation and oxygenation  Description: INTERVENTIONS:- Assess for changes in respiratory status- Assess for changes in mentation and behavior- Position to facilitate oxygenation and minimize respiratory effort- Oxygen supplementation based on oxygen saturation or ABGs- Provide Smoking Cessation handout, if applicable- Encourage broncho-pulmonary hygiene including cough, deep breathe, Incentive Spirometry- Assess the need for suctioning and perform as needed- Assess and instruct to report SOB or any respiratory difficulty- Respiratory Therapy support as indicated- Manage/alleviate anxiety- Monitor for signs/symptoms of CO2 retention  Outcome: Progressing

## 2025-04-28 NOTE — CM/SW NOTE
THOMAS received MDO for POLST. THOMAS placed POLST form on chart. MD to discuss w/ pt/family, fill out middle section of POLST form, and sign.    Negrita EVANGELISTA, LCSW  Discharge Planner

## 2025-04-28 NOTE — CM/SW NOTE
THOMAS met with pt's family member at bedside to discuss discharge plan. HH list given. Agreeable to [facility]. Reserved in A[facility]. Confirmed PCP is Wicho Ward [phone]      Addendum: [facility] not able to accept as NP Ward requesting their home health agency.  THOMAS attempted to call above phone # - agency has to call this SW back w/ the fax # to send HH orders. Await call back.     Addendum: THOMAS faxed referral for orders for to [phone]. Noted that a referral for HH was also sent to [facility] via [facility].     Negrita EVANGELISTA, LCSW  Discharge Planner

## 2025-04-28 NOTE — PLAN OF CARE
Patient is alert & oriented x 4, on room air. Up x1 pivot assist w/ walker. Afib on tele monitor. Continent of bowel and bladder. Purewick in place. Patient denies any pain, shortness of breath, or chest pain/discomfort. Plan is for ABX and CT of chest, pending results. Updated on plan of care,  utilized. Fall precautions in place. Call light in reach.    Problem: SAFETY ADULT - FALL  Goal: Free from fall injury  Description: INTERVENTIONS:- Assess pt frequently for physical needs- Identify cognitive and physical deficits and behaviors that affect risk of falls.- [place] fall precautions as indicated by assessment.- Educate pt/family on patient safety including physical limitations- Instruct pt to call for assistance with activity based on assessment- Modify environment to reduce risk of injury- Provide assistive devices as appropriate- Consider OT/PT consult to assist with strengthening/mobility- Encourage toileting schedule  Outcome: Progressing     Problem: CARDIOVASCULAR - ADULT  Goal: Maintains optimal cardiac output and hemodynamic stability  Description: INTERVENTIONS:- Monitor vital signs, rhythm, and trends- Monitor for bleeding, hypotension and signs of decreased cardiac output- Evaluate effectiveness of vasoactive medications to optimize hemodynamic stability- Monitor arterial and/or venous puncture sites for bleeding and/or hematoma- Assess quality of pulses, skin color and temperature- Assess for signs of decreased coronary artery perfusion - ex. Angina- Evaluate fluid balance, assess for edema, trend weights  Outcome: Progressing  Goal: Absence of cardiac arrhythmias or at baseline  Description: INTERVENTIONS:- Continuous cardiac monitoring, monitor vital signs, obtain 12 lead EKG if indicated- Evaluate effectiveness of antiarrhythmic and heart rate control medications as ordered- Initiate emergency measures for life threatening arrhythmias- Monitor electrolytes and administer replacement  therapy as ordered  Outcome: Progressing     Problem: RESPIRATORY - ADULT  Goal: Achieves optimal ventilation and oxygenation  Description: INTERVENTIONS:- Assess for changes in respiratory status- Assess for changes in mentation and behavior- Position to facilitate oxygenation and minimize respiratory effort- Oxygen supplementation based on oxygen saturation or ABGs- Provide Smoking Cessation handout, if applicable- Encourage broncho-pulmonary hygiene including cough, deep breathe, Incentive Spirometry- Assess the need for suctioning and perform as needed- Assess and instruct to report SOB or any respiratory difficulty- Respiratory Therapy support as indicated- Manage/alleviate anxiety- Monitor for signs/symptoms of CO2 retention  Outcome: Progressing

## 2025-04-29 ENCOUNTER — APPOINTMENT (OUTPATIENT)
Dept: GENERAL RADIOLOGY | Facility: HOSPITAL | Age: OVER 89
End: 2025-04-29
Attending: INTERNAL MEDICINE
Payer: MEDICARE

## 2025-04-29 PROBLEM — R06.00 DYSPNEA AND RESPIRATORY ABNORMALITIES: Status: ACTIVE | Noted: 2025-04-29

## 2025-04-29 PROBLEM — R06.89 DYSPNEA AND RESPIRATORY ABNORMALITIES: Status: ACTIVE | Noted: 2025-04-29

## 2025-04-29 LAB
ALBUMIN SERPL-MCNC: 3.8 G/DL (ref 3.2–4.8)
ALBUMIN/GLOB SERPL: 1.2 {RATIO} (ref 1–2)
ALP LIVER SERPL-CCNC: 76 U/L (ref 55–142)
ALT SERPL-CCNC: 13 U/L (ref 10–49)
ANION GAP SERPL CALC-SCNC: 9 MMOL/L (ref 0–18)
AST SERPL-CCNC: 22 U/L (ref ?–34)
ATRIAL RATE: 416 BPM
BASOPHILS # BLD AUTO: 0.01 X10(3) UL (ref 0–0.2)
BASOPHILS NFR BLD AUTO: 0.4 %
BILIRUB SERPL-MCNC: 0.3 MG/DL (ref 0.2–0.9)
BUN BLD-MCNC: 22 MG/DL (ref 9–23)
CALCIUM BLD-MCNC: 9.4 MG/DL (ref 8.7–10.6)
CHLORIDE SERPL-SCNC: 99 MMOL/L (ref 98–112)
CO2 SERPL-SCNC: 26 MMOL/L (ref 21–32)
CREAT BLD-MCNC: 1.32 MG/DL (ref 0.55–1.02)
EGFRCR SERPLBLD CKD-EPI 2021: 37 ML/MIN/1.73M2 (ref 60–?)
EOSINOPHIL # BLD AUTO: 0 X10(3) UL (ref 0–0.7)
EOSINOPHIL NFR BLD AUTO: 0 %
ERYTHROCYTE [DISTWIDTH] IN BLOOD BY AUTOMATED COUNT: 13.9 %
GLOBULIN PLAS-MCNC: 3.1 G/DL (ref 2–3.5)
GLUCOSE BLD-MCNC: 198 MG/DL (ref 70–99)
HCT VFR BLD AUTO: 30.9 % (ref 35–48)
HGB BLD-MCNC: 10 G/DL (ref 12–16)
IMM GRANULOCYTES # BLD AUTO: 0.03 X10(3) UL (ref 0–1)
IMM GRANULOCYTES NFR BLD: 1.2 %
LYMPHOCYTES # BLD AUTO: 0.59 X10(3) UL (ref 1–4)
LYMPHOCYTES NFR BLD AUTO: 23.8 %
MAGNESIUM SERPL-MCNC: 1.9 MG/DL (ref 1.6–2.6)
MCH RBC QN AUTO: 30.9 PG (ref 26–34)
MCHC RBC AUTO-ENTMCNC: 32.4 G/DL (ref 31–37)
MCV RBC AUTO: 95.4 FL (ref 80–100)
MONOCYTES # BLD AUTO: 0.04 X10(3) UL (ref 0.1–1)
MONOCYTES NFR BLD AUTO: 1.6 %
NEUTROPHILS # BLD AUTO: 1.81 X10 (3) UL (ref 1.5–7.7)
NEUTROPHILS # BLD AUTO: 1.81 X10(3) UL (ref 1.5–7.7)
NEUTROPHILS NFR BLD AUTO: 73 %
OSMOLALITY SERPL CALC.SUM OF ELEC: 287 MOSM/KG (ref 275–295)
PLATELET # BLD AUTO: 179 10(3)UL (ref 150–450)
POTASSIUM SERPL-SCNC: 4.2 MMOL/L (ref 3.5–5.1)
PROT SERPL-MCNC: 6.9 G/DL (ref 5.7–8.2)
Q-T INTERVAL: 412 MS
QRS DURATION: 126 MS
QTC CALCULATION (BEZET): 475 MS
R AXIS: -17 DEGREES
RBC # BLD AUTO: 3.24 X10(6)UL (ref 3.8–5.3)
SODIUM SERPL-SCNC: 134 MMOL/L (ref 136–145)
T AXIS: 104 DEGREES
VENTRICULAR RATE: 80 BPM
WBC # BLD AUTO: 2.5 X10(3) UL (ref 4–11)

## 2025-04-29 PROCEDURE — 99232 SBSQ HOSP IP/OBS MODERATE 35: CPT | Performed by: INTERNAL MEDICINE

## 2025-04-29 PROCEDURE — 71045 X-RAY EXAM CHEST 1 VIEW: CPT | Performed by: INTERNAL MEDICINE

## 2025-04-29 RX ORDER — METHYLPREDNISOLONE SODIUM SUCCINATE 40 MG/ML
40 INJECTION INTRAMUSCULAR; INTRAVENOUS EVERY 12 HOURS
Status: DISCONTINUED | OUTPATIENT
Start: 2025-04-30 | End: 2025-04-30

## 2025-04-29 RX ORDER — ALBUTEROL SULFATE 0.83 MG/ML
SOLUTION RESPIRATORY (INHALATION)
Status: DISPENSED
Start: 2025-04-29 | End: 2025-04-29

## 2025-04-29 NOTE — PLAN OF CARE
Patient is alert & oriented x 4, on room air. Up x1 assist w/ walker. Afib on tele monitor. Continent of bowel. Purewick in place. Patient denies any pain, shortness of breath, or chest pain/discomfort. Plan is for IV Rocephin, CXR in AM, scheduled nebs, IV solumedrol. Updated on plan of care. Fall precautions in place. Call light in reach.    Problem: SAFETY ADULT - FALL  Goal: Free from fall injury  Description: INTERVENTIONS:- Assess pt frequently for physical needs- Identify cognitive and physical deficits and behaviors that affect risk of falls.- [place] fall precautions as indicated by assessment.- Educate pt/family on patient safety including physical limitations- Instruct pt to call for assistance with activity based on assessment- Modify environment to reduce risk of injury- Provide assistive devices as appropriate- Consider OT/PT consult to assist with strengthening/mobility- Encourage toileting schedule  Outcome: Progressing     Problem: CARDIOVASCULAR - ADULT  Goal: Maintains optimal cardiac output and hemodynamic stability  Description: INTERVENTIONS:- Monitor vital signs, rhythm, and trends- Monitor for bleeding, hypotension and signs of decreased cardiac output- Evaluate effectiveness of vasoactive medications to optimize hemodynamic stability- Monitor arterial and/or venous puncture sites for bleeding and/or hematoma- Assess quality of pulses, skin color and temperature- Assess for signs of decreased coronary artery perfusion - ex. Angina- Evaluate fluid balance, assess for edema, trend weights  Outcome: Progressing  Goal: Absence of cardiac arrhythmias or at baseline  Description: INTERVENTIONS:- Continuous cardiac monitoring, monitor vital signs, obtain 12 lead EKG if indicated- Evaluate effectiveness of antiarrhythmic and heart rate control medications as ordered- Initiate emergency measures for life threatening arrhythmias- Monitor electrolytes and administer replacement therapy as  ordered  Outcome: Progressing     Problem: RESPIRATORY - ADULT  Goal: Achieves optimal ventilation and oxygenation  Description: INTERVENTIONS:- Assess for changes in respiratory status- Assess for changes in mentation and behavior- Position to facilitate oxygenation and minimize respiratory effort- Oxygen supplementation based on oxygen saturation or ABGs- Provide Smoking Cessation handout, if applicable- Encourage broncho-pulmonary hygiene including cough, deep breathe, Incentive Spirometry- Assess the need for suctioning and perform as needed- Assess and instruct to report SOB or any respiratory difficulty- Respiratory Therapy support as indicated- Manage/alleviate anxiety- Monitor for signs/symptoms of CO2 retention  Outcome: Progressing

## 2025-04-29 NOTE — SLP NOTE
ADULT SWALLOWING EVALUATION    ASSESSMENT    ASSESSMENT/OVERALL IMPRESSION:  Patient is a 89 year old female admitted to hospital on 4/24/25 with hypoxic respiratory failure. Orders were received for a bedside swallowing evaluation to rule out aspiration component. Patient on room air when contacted at the bedside.   Oral motor mechanism exam revealed functional oral range, rate, and strength of oral musculature, edentulous, clear vocal quality and strong cough on command.  Assessed patient with thin liquids via spoon, cup, and straw, puree, and solids.   Oral phase revealed functional oral acceptance, retrieval and mastication of solids with timely and complete clearing of the oral cavity.   Pharyngeal phase revealed an apparent timely initiation of the pharyngeal phase with functional hyolaryngeal elevation on palpation. No coughing or throat clearing. Patient presents with functional oral phase and apparent intact pharyngeal phase of swallow free of overt clinical s/s of aspiration.  Choose softer solids.   Recommend regular solids(precut) and thin liquids. No further skilled swallowing intervention warranted.          RECOMMENDATIONS   Diet Recommendations - Solids: Regular (Precut solids)  Diet Recommendations - Liquids: Thin Liquids       Aspiration Precautions: Upright position  Medication Administration Recommendations: No restrictions  Treatment Plan/Recommendations: Aspiration precautions    HISTORY   MEDICAL HISTORY  Reason for Referral: R/O aspiration    Problem List  Principal Problem:    Atrial fibrillation with RVR ([facility])  Active Problems:    Acute on chronic congestive heart failure, unspecified heart failure type ([facility])      Past Medical History  Past Medical History[1]    Prior Living Situation: Home alone  Diet Prior to Admission: Regular, Thin liquids       Patient/Family Goals: To eat and drink    SWALLOWING HISTORY  Current Diet Consistency: Regular, Thin liquids  Dysphagia History: No past  history reported  Imaging Results:   CXR on 4/29 CONCLUSION:    1. Interstitial opacities bilaterally suggestive of edema.   2. Small bilateral pleural effusions with bilateral basilar atelectasis/infiltrates.   3. Opacity within right upper lobe representing atelectasis/infiltrates.    SUBJECTIVE       OBJECTIVE   ORAL MOTOR EXAMINATION  Dentition: Edentulous  Symmetry: Within Functional Limits  Strength: Within Functional Limits  Tone: Within Functional Limits  Range of Motion: Within Functional Limits  Rate of Motion: Within Functional Limits    Voice Quality: Clear  Respiratory Status: Unlabored  Consistencies Trialed: Thin liquids, Puree, Soft solid (bite sized hard solid)  Method of Presentation: Self presentation, Spoon, Cup, Straw, Consecutive swallows  Patient Positioned: Upright, Midline (in bed)    Oral Phase of Swallow: Within Functional Limits                      Pharyngeal Phase of Swallow: Within Functional Limits           (Please note: Silent aspiration cannot be evaluated clinically. Videofluoroscopic Swallow Study is required to rule-out silent aspiration.)    Esophageal Phase of Swallow: No complaints consistent with possible esophageal involvement  Comments:       FOLLOW UP  Treatment Plan/Recommendations: Aspiration precautions     Follow Up Needed (Documentation Required): No       Thank you for your referral.   If you have any questions, please contact CINDY Bae         [1]   Past Medical History:   Arrhythmia    Atrial fibrillation (HCC)    Cancer (HCC)    Congestive heart disease (HCC)    Essential hypertension

## 2025-04-29 NOTE — PROGRESS NOTES
[facility]  Progress Note    Giselle Weeks Patient Status:  Inpatient    1929 MRN [id number]   Location [facility] 8NE-A Attending Judith Azul,    Hosp Day # 5 PCP Wicho Ward     Subjective:  Giselle Weeks is a(n) 89 year old female remains afebrile  Overall her daughter-in-law states she is better today less coughing  No evidence of dyspnea  Has been on room air all day    Objective:  /73 (BP Location: Left arm)   Pulse 69   Temp 97.7 °F (36.5 °C) (Oral)   Resp 18   Ht 5' 2\" (1.575 m)   Wt 124 lb 12.5 oz (56.6 kg)   SpO2 96%   BMI 22.82 kg/m² room air      Temp (24hrs), Av.6 °F (36.4 °C), Min:97.2 °F (36.2 °C), Max:97.9 °F (36.6 °C)      Intake/Output:    Intake/Output Summary (Last 24 hours) at 2025 1744  Last data filed at 2025 1625  Gross per 24 hour   Intake 240 ml   Output 250 ml   Net -10 ml       Physical Exam:   General: alert, cooperative, oriented.  Per her daughter-in-law no respiratory distress.   Head: Normocephalic, without obvious abnormality, atraumatic.   Throat: Lips, mucosa, and tongue normal.  No thrush noted.  Small area   Neck: trachea midline, no adenopathy, no thyromegaly.    Lungs: Less rhonchi bilaterally remains with rales   Chest wall: No tenderness or deformity.   Heart: Murmurs noted   Abdomen: soft, non-distended, no masses, no guarding, no     Rebound.  No obvious hepatosplenomegaly or ascites   Extremity: Trace edema   Skin: No rashes or lesions.   Neurological: Alert, interactive, no focal deficits    Lab Data Review:  Recent Labs     25  0539   WBC 2.5*   HGB 10.0*   .0     Recent Labs     25  0311 25  0448 25  0539   * 137 134*   K 3.8 4.0  4.0 4.2    102 99   CO2 24.0 29.0 26.0   BUN 18 21 22   CREATSERUM 1.22* 1.29* 1.32*     Recent Labs   Lab 25  0753   PTP 16.6*   INR 1.36*   PTT 39.2*       Cultures: Blood cultures remain negative    Radiology:  XR CHEST AP PORTABLE   (CPT=71045)  Result Date: 4/29/2025  CONCLUSION:  1. Interstitial opacities bilaterally suggestive of edema. 2. Small bilateral pleural effusions with bilateral basilar atelectasis/infiltrates. 3. Opacity within right upper lobe representing atelectasis/infiltrates.   LOCATION:  [id number]      Dictated by (CST): Denis Rubio MD on 4/29/2025 at 7:55 AM     Finalized by (CST): Denis Rubio MD on 4/29/2025 at 7:58 AM       Chest x-ray seems somewhat improved from prior 4/26      Medications reviewed     Assessment and Plan:   Problem List[1]    Assessment:  Dyspnea and hypoxia-suspect multifactorial with fluid overload HFpEF, bilateral effusions pulmonary infiltrates and evidence of airways obstruction on exam  HFpEF acute on chronic  Bilateral effusions suspect related to the above-new from 7/3/2024  Pulmonary infiltrates with areas of parabronchial consolidation as can be seen with cryptogenic organizing pneumonia/questionable component of bacterial-to complete treatment for pneumonia-questionable aspiration risk  Airways obstruction on exam-questionable primary asthma versus related to --recent 6-week course of prednisone with improvement  A-fib with RVR rate now controlled     Plan:  Agree with ongoing gentle diuresis  Continue nebulized bronchodilators as able  To complete course of antibiotics  Clinically seems improved with steroids  Check spirometry if able  Consider home with short prednisone wean if continues to      CC     Chetna Malone MD  4/29/2025  5:44 PM         [1]   Patient Active Problem List  Diagnosis    Atrial fibrillation with rapid ventricular response (HCC)    Anemia, unspecified type    Acute on chronic congestive heart failure, unspecified heart failure type (HCC)    Atrial fibrillation with RVR (HCC)

## 2025-04-29 NOTE — PHYSICAL THERAPY NOTE
PHYSICAL THERAPY TREATMENT NOTE - INPATIENT    Room Number: 8604/8604-A     Session: 1     Number of Visits to Meet Established Goals: 3    Presenting Problem: Atrial fibrillation with RVR  Co-Morbidities : HFpEF, mitral valve regurgitation, HTN, HLD    PHYSICAL THERAPY ASSESSMENT   Patient demonstrates good  progress this session, goals  remain in progress.      Patient is requiring contact guard assist and minimal assist as a result of the following impairments: decreased functional strength, decreased endurance/aerobic capacity, impaired dynamic standing balance, decreased muscular endurance, and medical status.     Patient continues to function near baseline with bed mobility, transfers, and gait.  Next session anticipate patient to progress bed mobility, transfers, and gait.  Physical Therapy will continue to follow patient for duration of hospitalization.    Patient continues to benefit from continued skilled PT services: at discharge to promote prior level of function.  Anticipate patient will return home with home health PT.    PLAN DURING HOSPITALIZATION  Nursing Mobility Recommendation : 1 Assist  PT Device Recommendation: Rolling walker  PT Treatment Plan: Bed mobility, Body mechanics, Gait training, Strengthening, Stair training, Balance training, Transfer training  Frequency (Obs): 3-5x/week     CURRENT GOALS     Goal #1 Patient is able to demonstrate supine - sit EOB @ level: supervision      Goal #2 Patient is able to demonstrate transfers EOB to/from C at assistance level: supervision      Goal #3 Patient is able to ambulate 150 feet with assist device: walker - rolling at assistance level: supervision      Goal #4     Goal #5     Goal #6     Goal Comments: Goals established on 4/25/2025 4/29/2025 all goals ongoing    SUBJECTIVE  \"Ok, Ok\"    OBJECTIVE  Precautions:  needed    WEIGHT BEARING RESTRICTION     PAIN ASSESSMENT   Rating: Unable to rate  Location: Pt reports no  pain  Management Techniques: Activity promotion, Repositioning    BALANCE                                                                                                                       Static Sitting: Fair  Dynamic Sitting: Fair           Static Standing: Fair -  Dynamic Standing: Fair -    ACTIVITY TOLERANCE                         O2 WALK       AM-PAC '6-Clicks' INPATIENT SHORT FORM - BASIC MOBILITY  How much difficulty does the patient currently have...  Patient Difficulty: Turning over in bed (including adjusting bedclothes, sheets and blankets)?: A Little   Patient Difficulty: Sitting down on and standing up from a chair with arms (e.g., wheelchair, bedside commode, etc.): A Little   Patient Difficulty: Moving from lying on back to sitting on the side of the bed?: A Little   How much help from another person does the patient currently need...   Help from Another: Moving to and from a bed to a chair (including a wheelchair)?: A Little   Help from Another: Need to walk in hospital room?: A Little   Help from Another: Climbing 3-5 steps with a railing?: A Little     AM-PAC Score:  Raw Score: 18   Approx Degree of Impairment: 46.58%   Standardized Score (AM-PAC Scale): 43.63   CMS Modifier (G-Code): CK    FUNCTIONAL ABILITY STATUS  Gait Assessment   Functional Mobility/Gait Assessment  Gait Assistance: Minimum assistance  Distance (ft): 20    Skilled Therapy Provided    Pt received supine in bed. Ipad  utilized throughout session. Performed standing transfer from EOB to BSC with RW.  Gait trained pt 20 ft with chair follow. VC's given for pt to maintain COG within the RW and maintain a neutral gaze. Difficult to understand pt throughout session, Ipad  stated much of what pt was saying was unintelligible. Pt left sitting up in chair with call light within reach.     Bed Mobility:  Rolling: nt   Supine<>Sit: mod A   Sit<>Supine: nt     Transfer Mobility:  Sit<>Stand: min A   Stand<>Sit: CGA     Gait: CGA    Therapist's Comments: Difficult to understand pt throughout session, Ipad  stated much of what pt was saying was unintelligible. Pt's BP and vitals monitored throughout session    97-99% on RA   119/63 supine    130/69 seated  106/84 standing   126/74 s/p t/f to chair           Patient End of Session: Up in chair, Needs met, Call light within reach, RN aware of session/findings, Alarm set    PT Session Time: 30 minutes  Gait Training: 15 minutes  Therapeutic Activity: 15 minutes  Therapeutic Exercise: 0 minutes   Neuromuscular Re-education: 0 minutes  BRADLEY Adrian  Cosigned :Sandrita Ellis PTA

## 2025-04-30 LAB
ALBUMIN SERPL-MCNC: 3.5 G/DL (ref 3.2–4.8)
ALBUMIN/GLOB SERPL: 1.3 {RATIO} (ref 1–2)
ALP LIVER SERPL-CCNC: 71 U/L (ref 55–142)
ALT SERPL-CCNC: 12 U/L (ref 10–49)
ANION GAP SERPL CALC-SCNC: 8 MMOL/L (ref 0–18)
AST SERPL-CCNC: 19 U/L (ref ?–34)
BASOPHILS # BLD AUTO: 0.01 X10(3) UL (ref 0–0.2)
BASOPHILS NFR BLD AUTO: 0.2 %
BILIRUB SERPL-MCNC: 0.2 MG/DL (ref 0.2–0.9)
BUN BLD-MCNC: 28 MG/DL (ref 9–23)
CALCIUM BLD-MCNC: 9.4 MG/DL (ref 8.7–10.6)
CHLORIDE SERPL-SCNC: 100 MMOL/L (ref 98–112)
CO2 SERPL-SCNC: 26 MMOL/L (ref 21–32)
CREAT BLD-MCNC: 1.49 MG/DL (ref 0.55–1.02)
EGFRCR SERPLBLD CKD-EPI 2021: 32 ML/MIN/1.73M2 (ref 60–?)
EOSINOPHIL # BLD AUTO: 0 X10(3) UL (ref 0–0.7)
EOSINOPHIL NFR BLD AUTO: 0 %
ERYTHROCYTE [DISTWIDTH] IN BLOOD BY AUTOMATED COUNT: 14 %
GLOBULIN PLAS-MCNC: 2.8 G/DL (ref 2–3.5)
GLUCOSE BLD-MCNC: 169 MG/DL (ref 70–99)
HCT VFR BLD AUTO: 29 % (ref 35–48)
HGB BLD-MCNC: 9.8 G/DL (ref 12–16)
IMM GRANULOCYTES # BLD AUTO: 0.08 X10(3) UL (ref 0–1)
IMM GRANULOCYTES NFR BLD: 1.8 %
LYMPHOCYTES # BLD AUTO: 0.82 X10(3) UL (ref 1–4)
LYMPHOCYTES NFR BLD AUTO: 18.3 %
MCH RBC QN AUTO: 31.4 PG (ref 26–34)
MCHC RBC AUTO-ENTMCNC: 33.8 G/DL (ref 31–37)
MCV RBC AUTO: 92.9 FL (ref 80–100)
MONOCYTES # BLD AUTO: 0.14 X10(3) UL (ref 0.1–1)
MONOCYTES NFR BLD AUTO: 3.1 %
NEUTROPHILS # BLD AUTO: 3.42 X10 (3) UL (ref 1.5–7.7)
NEUTROPHILS # BLD AUTO: 3.42 X10(3) UL (ref 1.5–7.7)
NEUTROPHILS NFR BLD AUTO: 76.6 %
OSMOLALITY SERPL CALC.SUM OF ELEC: 287 MOSM/KG (ref 275–295)
PLATELET # BLD AUTO: 200 10(3)UL (ref 150–450)
POTASSIUM SERPL-SCNC: 4.2 MMOL/L (ref 3.5–5.1)
PROT SERPL-MCNC: 6.3 G/DL (ref 5.7–8.2)
RBC # BLD AUTO: 3.12 X10(6)UL (ref 3.8–5.3)
SODIUM SERPL-SCNC: 134 MMOL/L (ref 136–145)
WBC # BLD AUTO: 4.5 X10(3) UL (ref 4–11)

## 2025-04-30 PROCEDURE — 99232 SBSQ HOSP IP/OBS MODERATE 35: CPT | Performed by: INTERNAL MEDICINE

## 2025-04-30 PROCEDURE — 99239 HOSP IP/OBS DSCHRG MGMT >30: CPT | Performed by: HOSPITALIST

## 2025-04-30 RX ORDER — PREDNISONE 10 MG/1
TABLET ORAL
Qty: 48 TABLET | Refills: 0 | Status: SHIPPED | OUTPATIENT
Start: 2025-04-30

## 2025-04-30 RX ORDER — BUDESONIDE 0.5 MG/2ML
0.5 INHALANT ORAL DAILY
Qty: 60 EACH | Refills: 0 | Status: SHIPPED | OUTPATIENT
Start: 2025-04-30

## 2025-04-30 RX ORDER — PREDNISONE 20 MG/1
40 TABLET ORAL
Status: DISCONTINUED | OUTPATIENT
Start: 2025-04-30 | End: 2025-04-30

## 2025-04-30 NOTE — PLAN OF CARE
Accquired patient around 0730. Alert and oriented x4. Talat speaking. Daughter in law at bedside as . On Ra. SpO2 within normal limits. Pt breathing presents comfortable. Scheduled nebs. Afib w PVC and BBB on tele. Continent of bowel. Purewick. IV steroids. IV abx. Plan for poss. Dc. Today. Call light within reach. Continue plan of care.     1040: Spoke with Dr. Dee from [facility] cardiology. Ok to dc from his perspective. Follow up next week OP.    Problem: SAFETY ADULT - FALL  Goal: Free from fall injury  Description: INTERVENTIONS:- Assess pt frequently for physical needs- Identify cognitive and physical deficits and behaviors that affect risk of falls.- [place] fall precautions as indicated by assessment.- Educate pt/family on patient safety including physical limitations- Instruct pt to call for assistance with activity based on assessment- Modify environment to reduce risk of injury- Provide assistive devices as appropriate- Consider OT/PT consult to assist with strengthening/mobility- Encourage toileting schedule  Outcome: Progressing     Problem: Patient/Family Goals  Goal: Patient/Family Long Term Goal  Description: Patient's Long Term Goal: Go home with adequate resources  4/28: Stay out of the hospital when discharged     Interventions:-SW for home needs  -PT/OT  -follow up care  -medicines  4/28:  -Attend follow up appointments as needed  -Follow medication regimen at home    See additional Care Plan goals for specific interventions  Outcome: Progressing  Goal: Patient/Family Short Term Goal  Description: Patient's Short Term Goal:Interventions: be comfortable  4/28: To go home    -medicines as needed  -O2 for comfort  -needs attended  4/28:  -Tele monitoring  -Monitor labs  -IV antibiotics   -PO azithromycin   -Pulmonology to see   -Pain management PRN  - See additional Care Plan goals for specific interventions  Outcome: Progressing     Problem: CARDIOVASCULAR - ADULT  Goal: Maintains optimal  cardiac output and hemodynamic stability  Description: INTERVENTIONS:- Monitor vital signs, rhythm, and trends- Monitor for bleeding, hypotension and signs of decreased cardiac output- Evaluate effectiveness of vasoactive medications to optimize hemodynamic stability- Monitor arterial and/or venous puncture sites for bleeding and/or hematoma- Assess quality of pulses, skin color and temperature- Assess for signs of decreased coronary artery perfusion - ex. Angina- Evaluate fluid balance, assess for edema, trend weights  Outcome: Progressing  Goal: Absence of cardiac arrhythmias or at baseline  Description: INTERVENTIONS:- Continuous cardiac monitoring, monitor vital signs, obtain 12 lead EKG if indicated- Evaluate effectiveness of antiarrhythmic and heart rate control medications as ordered- Initiate emergency measures for life threatening arrhythmias- Monitor electrolytes and administer replacement therapy as ordered  Outcome: Progressing     Problem: RESPIRATORY - ADULT  Goal: Achieves optimal ventilation and oxygenation  Description: INTERVENTIONS:- Assess for changes in respiratory status- Assess for changes in mentation and behavior- Position to facilitate oxygenation and minimize respiratory effort- Oxygen supplementation based on oxygen saturation or ABGs- Provide Smoking Cessation handout, if applicable- Encourage broncho-pulmonary hygiene including cough, deep breathe, Incentive Spirometry- Assess the need for suctioning and perform as needed- Assess and instruct to report SOB or any respiratory difficulty- Respiratory Therapy support as indicated- Manage/alleviate anxiety- Monitor for signs/symptoms of CO2 retention  Outcome: Progressing

## 2025-04-30 NOTE — PROGRESS NOTES
04/30/25 1630   Mobility   O2 walk? Yes   SPO2% on Room Air at Rest 100   SPO2% on Oxygen at Rest 98   At rest oxygen flow (liters per minute) 0   SPO2% Ambulation on Room Air 96   Ambulation oxygen flow (liters per minute) 0

## 2025-04-30 NOTE — PLAN OF CARE
Received patient at 1930.  Alert and oriented x 4-daughter translating, pt able to understand basic directions in English. Tele Rhythm A.fib, oxygen maintained on room air-pt insist on wearing nasal cannula for psychological comfort-oxygen saturation 99% on room air. Breath sounds -wheezing at times. Skin is intact. Last bowel movement 4/28. Patient voiding with no issues. Patient reports shortness of breath with ambulation. Bed is locked and in low position. Call light and personal items within reach. Pt up with walker and assistance.  All needs met.         Problem: SAFETY ADULT - FALL  Goal: Free from fall injury  Description: INTERVENTIONS:- Assess pt frequently for physical needs- Identify cognitive and physical deficits and behaviors that affect risk of falls.- [place] fall precautions as indicated by assessment.- Educate pt/family on patient safety including physical limitations- Instruct pt to call for assistance with activity based on assessment- Modify environment to reduce risk of injury- Provide assistive devices as appropriate- Consider OT/PT consult to assist with strengthening/mobility- Encourage toileting schedule  Outcome: Progressing     Problem: Patient/Family Goals  Goal: Patient/Family Long Term Goal  Description: Patient's Long Term Goal: Go home with adequate resources  4/28: Stay out of the hospital when discharged     Interventions:-SW for home needs  -PT/OT  -follow up care  -medicines  4/28:  -Attend follow up appointments as needed  -Follow medication regimen at home    See additional Care Plan goals for specific interventions  Outcome: Progressing  Goal: Patient/Family Short Term Goal  Description: Patient's Short Term Goal:Interventions: be comfortable  4/28: To go home    -medicines as needed  -O2 for comfort  -needs attended  4/28:  -Tele monitoring  -Monitor labs  -IV antibiotics   -PO azithromycin   -Pulmonology to see   -Pain management PRN  - See additional Care Plan goals for  specific interventions  Outcome: Progressing     Problem: CARDIOVASCULAR - ADULT  Goal: Maintains optimal cardiac output and hemodynamic stability  Description: INTERVENTIONS:- Monitor vital signs, rhythm, and trends- Monitor for bleeding, hypotension and signs of decreased cardiac output- Evaluate effectiveness of vasoactive medications to optimize hemodynamic stability- Monitor arterial and/or venous puncture sites for bleeding and/or hematoma- Assess quality of pulses, skin color and temperature- Assess for signs of decreased coronary artery perfusion - ex. Angina- Evaluate fluid balance, assess for edema, trend weights  Outcome: Progressing  Goal: Absence of cardiac arrhythmias or at baseline  Description: INTERVENTIONS:- Continuous cardiac monitoring, monitor vital signs, obtain 12 lead EKG if indicated- Evaluate effectiveness of antiarrhythmic and heart rate control medications as ordered- Initiate emergency measures for life threatening arrhythmias- Monitor electrolytes and administer replacement therapy as ordered  Outcome: Progressing     Problem: RESPIRATORY - ADULT  Goal: Achieves optimal ventilation and oxygenation  Description: INTERVENTIONS:- Assess for changes in respiratory status- Assess for changes in mentation and behavior- Position to facilitate oxygenation and minimize respiratory effort- Oxygen supplementation based on oxygen saturation or ABGs- Provide Smoking Cessation handout, if applicable- Encourage broncho-pulmonary hygiene including cough, deep breathe, Incentive Spirometry- Assess the need for suctioning and perform as needed- Assess and instruct to report SOB or any respiratory difficulty- Respiratory Therapy support as indicated- Manage/alleviate anxiety- Monitor for signs/symptoms of CO2 retention  Outcome: Progressing

## 2025-04-30 NOTE — PROGRESS NOTES
[facility]  Progress Note    Giselle Weeks Patient Status:  Inpatient    1929 MRN [id number]   Location [facility] 8NE-A Attending Sherly Cortes MD   Hosp Day # 6 PCP Wicho Ward     Subjective:  Giselle Weeks is a(n) 89 year old female remains afebrile  Overall continues to improve less coughing less shortness of breath  Patient walked in the halls with use of a walker with no desaturation at 98% on room    Objective:  /49 (BP Location: Left arm)   Pulse 66   Temp 97.6 °F (36.4 °C) (Oral)   Resp 16   Ht 5' 2\" (1.575 m)   Wt 125 lb 14.1 oz (57.1 kg)   SpO2 97%   BMI 23.02 kg/m² ra      Temp (24hrs), Av.8 °F (36.6 °C), Min:97.6 °F (36.4 °C), Max:98.3 °F (36.8 °C)      Intake/Output:    Intake/Output Summary (Last 24 hours) at 2025 1535  Last data filed at 2025 1000  Gross per 24 hour   Intake 720 ml   Output 0 ml   Net 720 ml       Physical Exam:   General: alert, cooperative, oriented per her daughter in law.  No respiratory distress.   Head: Normocephalic, without obvious abnormality, atraumatic.   Throat: Lips, mucosa, and tongue normal.  No thrush noted.  Edentulous   Neck: trachea midline, no adenopathy, no thyromegaly. No JVD.  At 90 degrees   Lungs: Bilateral rales mild at the bases clear some with deep inspiration wheeze seems resolved   Chest wall: No tenderness or deformity.   Heart: Murmurs noted no diarrhea   Abdomen: soft, non-distended, no masses, no guarding, no     Rebound.   Extremity: Trace edema most Cristina   Skin: No rashes or lesions.   Neurological: Alert, interactive, no focal deficits    Lab Data Review:  Recent Labs     25  0539 256   WBC 2.5* 4.5   HGB 10.0* 9.8*   .0 200.0     Recent Labs     25  0448 25  0539 25  0446    134* 134*   K 4.0  4.0 4.2 4.2    99 100   CO2 29.0 26.0 26.0   BUN 21 22 28*   CREATSERUM 1.29* 1.32* 1.49*     Recent Labs   Lab 25  0753   PTP 16.6*    INR 1.36*   PTT 39.2*       Cultures: Sputum remains in process-blood cultures negative/pending    Radiology:  No results found.  Chest x-ray is reviewed slight improvement      Medications reviewed     Assessment and Plan:   Problem List[1]    Assessment:  Dyspnea and hypoxia-suspect multifactorial with fluid overload HFpEF, bilateral effusions pulmonary infiltrates and evidence of airways obstruction on exam-improving and now on room air without desat with walking  HFpEF acute on chronic  Bilateral effusions suspect related to the above-new from 7/3/2024  Pulmonary infiltrates with areas of peribronchial consolidation as can be seen with cryptogenic organizing pneumonia/questionable component of bacterial-to complete treatment for pneumonia-questionable aspiration risk  Airways obstruction on exam-questionable primary asthma versus related to --recent 6-week course of prednisone with improvement  A-fib with RVR rate now controlled     Plan:  Okay to home from pulmonary standpoint with plans for follow-up if able  Plan for short course of prednisone  Plan for budesonide nebulized bronchodilators  Continue albuterol at home as needed  All questions answered as able      CC     Chetna Malone MD  4/30/2025  3:35 PM         [1]   Patient Active Problem List  Diagnosis    Atrial fibrillation with rapid ventricular response (HCC)    Anemia, unspecified type    Acute on chronic congestive heart failure, unspecified heart failure type (HCC)    Atrial fibrillation with RVR (HCC)    Dyspnea and respiratory abnormalities

## 2025-04-30 NOTE — PROGRESS NOTES
[facility]   part of [facility]     Hospitalist Progress Note     Adalkirstyacacia Weeks Patient Status:  Inpatient    1929 MRN [id number]   Location [facility] 0-A Attending Judith Azul,    Hosp Day # 6 PCP Wicho Ward     Chief Complaint: SOB    Subjective:     Feels well, no new overnight events    Objective:    Review of Systems:   A comprehensive review of systems was completed; pertinent positive and negatives stated in subjective.    Vital signs:  Temp:  [97.6 °F (36.4 °C)-98.3 °F (36.8 °C)] 97.6 °F (36.4 °C)  Pulse:  [66-92] 66  Resp:  [16-18] 16  BP: (105-126)/(49-81) 105/49  SpO2:  [94 %-100 %] 97 %    Physical Exam:    General: No acute distress  Respiratory: expiratory wheezing in the posterior lung fields  Cardiovascular: S1, S2, regular rate and rhythm  Abdomen: Soft, Non-tender, non-distended, positive bowel sounds  Neuro: No new focal deficits.   Extremities: No edema    Diagnostic Data:    Labs:  Recent Labs   Lab 25  0753 25  0730 25  0738 25  0539 25  0446   WBC 6.2 5.1 4.8 2.5* 4.5   HGB 11.0* 9.6* 9.5* 10.0* 9.8*   MCV 96.5 98.3 98.7 95.4 92.9   .0 164.0 157.0 179.0 200.0   INR 1.36*  --   --   --   --        Recent Labs   Lab 25  0730 25  0738 25  0448 25  0539 25  0446   *   < > 122* 198* 169*   BUN 17   < > 21 22 28*   CREATSERUM 1.40*   < > 1.29* 1.32* 1.49*   CA 9.2   < > 9.3 9.4 9.4   ALB 3.6  --   --  3.8 3.5      < > 137 134* 134*   K 3.6   < > 4.0  4.0 4.2 4.2      < > 102 99 100   CO2 29.0   < > 29.0 26.0 26.0   ALKPHO 72  --   --  76 71   AST 21  --   --  22 19   ALT 11  --   --  13 12   BILT 0.3  --   --  0.3 0.2   TP 6.4  --   --  6.9 6.3    < > = values in this interval not displayed.       Estimated Glomerular Filtration Rate: 32 mL/min/1.73m2 (A) (result from lab).    Recent Labs   Lab 25  0753   TROPHS 9       Recent Labs   Lab 25  0753   PTP 16.6*   INR  1.36*                  Microbiology    Hospital Encounter on 04/24/25   1. Blood Culture     Status: None    Collection Time: 04/24/25  7:44 PM    Specimen: Blood,peripheral   Result Value Ref Range    Blood Culture Result No Growth 5 Days N/A         Imaging: Reviewed in Epic.    Medications: Scheduled Medications[1]    Assessment & Plan:      #Acute hypoxic respiratory failure   Suspect PNA/fluid. Resolved.     #Bronchospasm  #B/l pleural effusions   #Inflammatory ground glass opacities and consolidations worse in RUL  #Narrowed appearance of trachea and mainstem bronchi   Patient has a chronic dry cough. No other infectious symptoms per daughter. Viral panel negative. Low procal. Repeat CXR 4/26 with worsening vascular congestion. Patient also with cough newly productive of yellow sputum.  -Continue antibiotics   -Sputum culture, SLP eval  -Pulm following  -Steroid taper per pulm    #Acute on chronic HFpEF  -lasix 20 mg PO daily     #KHRIS on CKD stage III-stable      #Afib with RVR  - improved  -Cardiology following  -Eliquis, diltiazem 120 mg daily, coreg, IV lopressor as needed     #Normocytic anemia - trend      #HTN  #HLD    Sherly Cortes M.D.  [facility]      Supplementary Documentation:     Quality:  DVT Mechanical Prophylaxis:     Early ambuation  DVT Pharmacologic Prophylaxis   Medication    apixaban (Eliquis) tab 2.5 mg         DVT Pharmacologic prophylaxis: Aspirin 81 mg      Code Status: DNAR/Selective Treatment  Quigley: External urinary catheter in place  Quigley Duration (in days):   Central line:    SHELLY: 4/30/2025    Discharge is dependent on: course  At this point Ms. Weeks is expected to be discharge to: pending PTOT    The 21st Century Cures Act makes medical notes like these available to patients in the interest of transparency. Please be advised this is a medical document. Medical documents are intended to carry relevant information, facts as evident, and the clinical opinion of the  practitioner. The medical note is intended as peer to peer communication and may appear blunt or direct. It is written in medical language and may contain abbreviations or verbiage that are unfamiliar.                         [1]    methylPREDNISolone  40 mg Intravenous Q12H    furosemide  20 mg Oral Daily    albuterol  2.5 mg Nebulization QID    cefTRIAXone  2 g Intravenous Q24H    carvedilol  3.125 mg Oral BID with meals    apixaban  2.5 mg Oral BID    aspirin  81 mg Oral Daily    dilTIAZem HCl  120 mg Oral Daily

## 2025-04-30 NOTE — PROGRESS NOTES
Reviewed discharge plan of care with daughter in law. Verbalized understanding. IV removed. Tele removed. All questions answered. Family as discharge transportation.

## 2025-05-01 ENCOUNTER — TELEPHONE (OUTPATIENT)
Facility: CLINIC | Age: OVER 89
End: 2025-05-01

## 2025-05-01 NOTE — TELEPHONE ENCOUNTER
Patient called. Spoke with patient's daughter in law Wayne. Reviewed budesonide, albuterol, prednisone and eliquis instructions as listed on patient's hospital after visit summary. Patient's daughter in law advised to look at the packet given for medication instructions. Daughter in law verbalized understanding.

## 2025-05-02 VITALS
SYSTOLIC BLOOD PRESSURE: 106 MMHG | WEIGHT: 125 LBS | OXYGEN SATURATION: 88 % | TEMPERATURE: 98 F | BODY MASS INDEX: 23 KG/M2 | HEART RATE: 69 BPM | HEIGHT: 62 IN | DIASTOLIC BLOOD PRESSURE: 49 MMHG | RESPIRATION RATE: 16 BRPM

## 2025-05-02 NOTE — DISCHARGE SUMMARY
[facility]     Giselle Weeks Patient Status:  Inpatient    1929 MRN [id number]   Location [facility] 8NE-A Attending No att. providers found   Hosp Day # 6 PCP Wicho Ward     Date of Admission: 2025  Date of Discharge:  2025     Discharge Disposition: Home or Self Care    Discharge Diagnosis:  Acute hypoxic respiratory failure  Bronchospasm with bilateral pleural effusions  Acute on chronic heart failure with preserved ejection fraction  Acute kidney injury on chronic kidney disease stage III  A-fib with RVR  Normocytic anemia  Hypertension  Hyperlipidemia    History of Present Illness:   Giselle Weeks is a 89 year old female with history of chronic HFpEF (EF60-65%), afib on eliquis, CKD stage III, HTN, HLD presented with SOB and wheezing.      Patient is Talat speaking and has hearing deficit. History was obtained from chart review. Family not preset at the time of my evaluation. Patient with 1 day history of SOB, abdominal pain. Today she had progression of her symptoms prompting family to bring her to the ED.     In ED, patient with afib with RVR to 130s. Shew as hypoxic requiring 1L NC. CXR with focal ground glass airspace disease within R mid lung, diffuse interstitial opacities infection vs. Edema. She was given IV lasix. Cardiology was consulted.        Brief Synopsis:   Patient is a 89-year-old female admitted with acute hypoxic respiratory failure secondary to pneumonia and fluid overload.  She had a viral panel which was negative.  She was placed on antibiotics and a steroid taper as well as Lasix.  Her oxygen was weaned.  She remained stable.  She developed A-fib with RVR which improved she was placed on Eliquis, diltiazem and Coreg.  Patient was discharged home in good condition    Lace+ Score: 73  59-90 High Risk  29-58 Medium Risk  0-28   Low Risk       TCM Follow-Up Recommendation:  LACE > 58: High Risk of readmission after discharge from  the hospital.      Procedures during hospitalization:   none    Consultants:  Cardiology  Pulmonary    Discharge Medication List:     Discharge Medications        START taking these medications        Instructions Prescription details   budesonide 0.5 MG/2ML Susp  Commonly known as: Pulmicort      Take 2 mL (0.5 mg total) by nebulization daily.   Quantity: 60 each  Refills: 0     predniSONE 10 MG Tabs  Commonly known as: Deltasone      4 p.o. every morning x 3 days then 3 p.o. every morning x 5 days then 2 p.o. every morning x 7 days then 1 p.o. every morning x 7 days   Quantity: 48 tablet  Refills: 0            CONTINUE taking these medications        Instructions Prescription details   albuterol (2.5 MG/3ML) 0.083% Nebu  Commonly known as: Ventolin      Take by nebulization every 6 (six) hours as needed for Wheezing.   Refills: 0     apixaban 5 MG Tabs  Commonly known as: Eliquis      Take 0.5 tablets (2.5 mg total) by mouth 2 (two) times daily.   Quantity: 60 tablet  Refills: 0     aspirin 81 MG Tbec      Take 1 tablet (81 mg total) by mouth.   Refills: 0     carvedilol 3.125 MG Tabs  Commonly known as: Coreg      Take 1 tablet (3.125 mg total) by mouth in the morning and 1 tablet (3.125 mg total) before bedtime.   Refills: 0     dilTIAZem HCl 120 MG Tabs  Commonly known as: CARDIZEM      Take 1 tablet (120 mg total) by mouth daily.   Quantity: 30 tablet  Refills: 3     furosemide 20 MG Tabs  Commonly known as: Lasix      Take 1 tablet (20 mg total) by mouth daily.   Quantity: 30 tablet  Refills: 0     ipratropium 0.06 % Soln  Commonly known as: Atrovent      2 sprays by Nasal route 4 (four) times daily.   Quantity: 1 Bottle  Refills: 11     potassium chloride 10 MEQ Tbcr  Commonly known as: Klor-Con M10      Take 1 tablet (10 mEq total) by mouth.   Refills: 0     traMADol 50 MG Tabs  Commonly known as: Ultram      Take 1 tablet (50 mg total) by mouth. Takes as needed   Refills: 0               Where to Get Your  Medications        These medications were sent to [id number] - [place] - [address] [phone], [phone]  [address], [place] [address]      Phone: [phone]   budesonide 0.5 MG/2ML Susp  predniSONE 10 MG Tabs         [place] reviewed[place] n/a    Follow-up appointment:   Shyla Balbuena MD  [address]  [facility] [address] [phone]    Go on 2025  11:40 am    **please do not remove this appointment- contact   [email or web address]**    Wicho Ward  [address]  [address]  [facility] [address] [phone]    Schedule an appointment as soon as possible for a visit in 1 week(s)      Chetna Malone MD  [address]   [facility] [address] [phone]    Follow up in 3 week(s)      Appointments for Next 30 Days 2025 - 2025      None            Vital signs:       Physical Exam:    General: No acute distress   Lungs: clear to auscultation  Cardiovascular: S1, S2  Abdomen: Soft      -----------------------------------------------------------------------------------------------  PATIENT DISCHARGE INSTRUCTIONS: See electronic chart    Sherly Cortes MD    Total time spent on discharge plannin minutes     The  [employer] Act makes medical notes like these available to patients in the interest of transparency. Please be advised this is a medical document. Medical documents are intended to carry relevant information, facts as evident, and the clinical opinion of the practitioner. The medical note is intended as peer to peer communication and may appear blunt or direct. It is written in medical language and may contain abbreviations or verbiage that are unfamiliar.

## 2025-05-27 ENCOUNTER — TELEPHONE (OUTPATIENT)
Dept: CARDIOLOGY UNIT | Facility: HOSPITAL | Age: OVER 89
End: 2025-05-27

## 2025-07-08 RX ORDER — BUDESONIDE 0.5 MG/2ML
INHALANT ORAL DAILY
Qty: 120 ML | Refills: 0 | OUTPATIENT
Start: 2025-07-08

## 2025-07-08 NOTE — TELEPHONE ENCOUNTER
Patient's daughter in law called. Made aware our office received an electronic request to refill budesonide nebs. Discussed with daughter, Dr. Malone would need to see patient for asssesment, last seen in the hospital in April. Per daughter in law, patient has a physician come to the home to see patient. Daughter in law advised to contact that physician. Otherwise patient would need to be seen by Dr. Malone or GALDINO. Daughter in law verbalized understanding.

## 2025-07-24 ENCOUNTER — HOSPITAL ENCOUNTER (INPATIENT)
Facility: HOSPITAL | Age: OVER 89
LOS: 4 days | Discharge: HOME HEALTH | End: 2025-07-28
Attending: EMERGENCY MEDICINE | Admitting: INTERNAL MEDICINE

## 2025-07-24 ENCOUNTER — APPOINTMENT (OUTPATIENT)
Dept: GENERAL RADIOLOGY | Facility: HOSPITAL | Age: OVER 89
End: 2025-07-24
Attending: EMERGENCY MEDICINE

## 2025-07-24 ENCOUNTER — HOSPITAL ENCOUNTER (INPATIENT)
Facility: HOSPITAL | Age: OVER 89
LOS: 4 days | Discharge: HOME | End: 2025-07-28
Attending: EMERGENCY MEDICINE | Admitting: INTERNAL MEDICINE

## 2025-07-24 DIAGNOSIS — I50.9 ACUTE ON CHRONIC CONGESTIVE HEART FAILURE, UNSPECIFIED HEART FAILURE TYPE (HCC): Primary | ICD-10-CM

## 2025-07-24 PROBLEM — R73.9 HYPERGLYCEMIA: Status: ACTIVE | Noted: 2025-07-24

## 2025-07-24 LAB
ALBUMIN SERPL-MCNC: 3.9 G/DL (ref 3.2–4.8)
ALBUMIN/GLOB SERPL: 1.2 (ref 1–2)
ALP LIVER SERPL-CCNC: 70 U/L (ref 55–142)
ALT SERPL-CCNC: 9 U/L (ref 10–49)
ANION GAP SERPL CALC-SCNC: 11 MMOL/L (ref 0–18)
AST SERPL-CCNC: 23 U/L (ref ?–34)
BASOPHILS # BLD AUTO: 0.05 X10(3) UL (ref 0–0.2)
BASOPHILS NFR BLD AUTO: 0.9 %
BILIRUB SERPL-MCNC: 0.5 MG/DL (ref 0.2–0.9)
BUN BLD-MCNC: 16 MG/DL (ref 9–23)
CALCIUM BLD-MCNC: 8.9 MG/DL (ref 8.7–10.6)
CHLORIDE SERPL-SCNC: 100 MMOL/L (ref 98–112)
CO2 SERPL-SCNC: 26 MMOL/L (ref 21–32)
CREAT BLD-MCNC: 1.42 MG/DL (ref 0.55–1.02)
EGFRCR SERPLBLD CKD-EPI 2021: 34 ML/MIN/1.73M2 (ref 60–?)
EOSINOPHIL # BLD AUTO: 0.1 X10(3) UL (ref 0–0.7)
EOSINOPHIL NFR BLD AUTO: 1.8 %
ERYTHROCYTE [DISTWIDTH] IN BLOOD BY AUTOMATED COUNT: 15.2 %
FLUAV + FLUBV RNA SPEC NAA+PROBE: NEGATIVE
FLUAV + FLUBV RNA SPEC NAA+PROBE: NEGATIVE
GLOBULIN PLAS-MCNC: 3.2 G/DL (ref 2–3.5)
GLUCOSE BLD-MCNC: 134 MG/DL (ref 70–99)
HCT VFR BLD AUTO: 30.4 % (ref 35–48)
HGB BLD-MCNC: 10.4 G/DL (ref 12–16)
IMM GRANULOCYTES # BLD AUTO: 0.13 X10(3) UL (ref 0–1)
IMM GRANULOCYTES NFR BLD: 2.3 %
LYMPHOCYTES # BLD AUTO: 2.79 X10(3) UL (ref 1–4)
LYMPHOCYTES NFR BLD AUTO: 49.8 %
MCH RBC QN AUTO: 32.4 PG (ref 26–34)
MCHC RBC AUTO-ENTMCNC: 34.2 G/DL (ref 31–37)
MCV RBC AUTO: 94.7 FL (ref 80–100)
MONOCYTES # BLD AUTO: 0.34 X10(3) UL (ref 0.1–1)
MONOCYTES NFR BLD AUTO: 6.1 %
NEUTROPHILS # BLD AUTO: 2.19 X10 (3) UL (ref 1.5–7.7)
NEUTROPHILS # BLD AUTO: 2.19 X10(3) UL (ref 1.5–7.7)
NEUTROPHILS NFR BLD AUTO: 39.1 %
NT-PROBNP SERPL-MCNC: 4558 PG/ML (ref ?–450)
OSMOLALITY SERPL CALC.SUM OF ELEC: 287 MOSM/KG (ref 275–295)
PLATELET # BLD AUTO: 212 10(3)UL (ref 150–450)
POTASSIUM SERPL-SCNC: 3.6 MMOL/L (ref 3.5–5.1)
PROT SERPL-MCNC: 7.1 G/DL (ref 5.7–8.2)
RBC # BLD AUTO: 3.21 X10(6)UL (ref 3.8–5.3)
RSV RNA SPEC NAA+PROBE: NEGATIVE
SARS-COV-2 RNA RESP QL NAA+PROBE: NOT DETECTED
SODIUM SERPL-SCNC: 137 MMOL/L (ref 136–145)
TROPONIN I SERPL HS-MCNC: 11 NG/L (ref ?–34)
WBC # BLD AUTO: 5.6 X10(3) UL (ref 4–11)

## 2025-07-24 PROCEDURE — 99223 1ST HOSP IP/OBS HIGH 75: CPT | Performed by: STUDENT IN AN ORGANIZED HEALTH CARE EDUCATION/TRAINING PROGRAM

## 2025-07-24 PROCEDURE — 71045 X-RAY EXAM CHEST 1 VIEW: CPT | Performed by: EMERGENCY MEDICINE

## 2025-07-24 RX ORDER — FUROSEMIDE 10 MG/ML
40 INJECTION INTRAMUSCULAR; INTRAVENOUS ONCE
Status: COMPLETED | OUTPATIENT
Start: 2025-07-24 | End: 2025-07-24

## 2025-07-25 ENCOUNTER — APPOINTMENT (OUTPATIENT)
Dept: CV DIAGNOSTICS | Facility: HOSPITAL | Age: OVER 89
End: 2025-07-25

## 2025-07-25 PROBLEM — D64.9 NORMOCYTIC ANEMIA: Status: ACTIVE | Noted: 2025-07-25

## 2025-07-25 PROBLEM — N18.32 STAGE 3B CHRONIC KIDNEY DISEASE (HCC): Status: ACTIVE | Noted: 2025-07-25

## 2025-07-25 PROBLEM — I50.33 ACUTE ON CHRONIC HEART FAILURE WITH PRESERVED EJECTION FRACTION (HCC): Status: ACTIVE | Noted: 2025-07-25

## 2025-07-25 PROBLEM — I48.20 CHRONIC ATRIAL FIBRILLATION (HCC): Status: ACTIVE | Noted: 2025-07-25

## 2025-07-25 LAB
Q-T INTERVAL: 360 MS
QRS DURATION: 122 MS
QTC CALCULATION (BEZET): 447 MS
R AXIS: -28 DEGREES
T AXIS: 124 DEGREES
VENTRICULAR RATE: 93 BPM

## 2025-07-25 PROCEDURE — 93306 TTE W/DOPPLER COMPLETE: CPT

## 2025-07-25 PROCEDURE — 99232 SBSQ HOSP IP/OBS MODERATE 35: CPT | Performed by: INTERNAL MEDICINE

## 2025-07-25 RX ORDER — BISACODYL 10 MG
10 SUPPOSITORY, RECTAL RECTAL
Status: DISCONTINUED | OUTPATIENT
Start: 2025-07-25 | End: 2025-07-28

## 2025-07-25 RX ORDER — ACETAMINOPHEN 500 MG
500 TABLET ORAL EVERY 4 HOURS PRN
Status: DISCONTINUED | OUTPATIENT
Start: 2025-07-25 | End: 2025-07-28

## 2025-07-25 RX ORDER — SENNOSIDES 8.6 MG
17.2 TABLET ORAL NIGHTLY PRN
Status: DISCONTINUED | OUTPATIENT
Start: 2025-07-25 | End: 2025-07-28

## 2025-07-25 RX ORDER — ECHINACEA PURPUREA EXTRACT 125 MG
1 TABLET ORAL
Status: DISCONTINUED | OUTPATIENT
Start: 2025-07-25 | End: 2025-07-28

## 2025-07-25 RX ORDER — CARVEDILOL 3.12 MG/1
3.12 TABLET ORAL 2 TIMES DAILY
Status: DISCONTINUED | OUTPATIENT
Start: 2025-07-25 | End: 2025-07-27

## 2025-07-25 RX ORDER — ONDANSETRON 2 MG/ML
4 INJECTION INTRAMUSCULAR; INTRAVENOUS EVERY 6 HOURS PRN
Status: DISCONTINUED | OUTPATIENT
Start: 2025-07-25 | End: 2025-07-28

## 2025-07-25 RX ORDER — POLYETHYLENE GLYCOL 3350 17 G/17G
17 POWDER, FOR SOLUTION ORAL DAILY PRN
Status: DISCONTINUED | OUTPATIENT
Start: 2025-07-25 | End: 2025-07-28

## 2025-07-25 RX ORDER — ALBUTEROL SULFATE 0.83 MG/ML
2.5 SOLUTION RESPIRATORY (INHALATION) EVERY 6 HOURS PRN
Status: DISCONTINUED | OUTPATIENT
Start: 2025-07-25 | End: 2025-07-28

## 2025-07-25 RX ORDER — DILTIAZEM HYDROCHLORIDE 30 MG/1
60 TABLET, FILM COATED ORAL 2 TIMES DAILY
Status: DISCONTINUED | OUTPATIENT
Start: 2025-07-25 | End: 2025-07-27

## 2025-07-25 RX ORDER — METOCLOPRAMIDE HYDROCHLORIDE 5 MG/ML
5 INJECTION INTRAMUSCULAR; INTRAVENOUS EVERY 8 HOURS PRN
Status: DISCONTINUED | OUTPATIENT
Start: 2025-07-25 | End: 2025-07-28

## 2025-07-25 RX ORDER — FUROSEMIDE 10 MG/ML
40 INJECTION INTRAMUSCULAR; INTRAVENOUS
Status: DISCONTINUED | OUTPATIENT
Start: 2025-07-25 | End: 2025-07-26

## 2025-07-25 RX ORDER — BENZONATATE 100 MG/1
200 CAPSULE ORAL 3 TIMES DAILY PRN
Status: DISCONTINUED | OUTPATIENT
Start: 2025-07-25 | End: 2025-07-28

## 2025-07-25 RX ORDER — BUDESONIDE 0.5 MG/2ML
0.5 INHALANT ORAL DAILY
Status: DISCONTINUED | OUTPATIENT
Start: 2025-07-25 | End: 2025-07-28

## 2025-07-25 RX ORDER — FERROUS SULFATE 325(65) MG
325 TABLET, DELAYED RELEASE (ENTERIC COATED) ORAL
COMMUNITY

## 2025-07-25 RX ORDER — IPRATROPIUM BROMIDE 42 UG/1
2 SPRAY, METERED NASAL 4 TIMES DAILY
Status: DISCONTINUED | OUTPATIENT
Start: 2025-07-25 | End: 2025-07-28

## 2025-07-25 RX ORDER — FERROUS SULFATE 325(65) MG
325 TABLET, DELAYED RELEASE (ENTERIC COATED) ORAL
Status: DISCONTINUED | OUTPATIENT
Start: 2025-07-25 | End: 2025-07-28

## 2025-07-26 LAB
ANION GAP SERPL CALC-SCNC: 11 MMOL/L (ref 0–18)
BUN BLD-MCNC: 15 MG/DL (ref 9–23)
CALCIUM BLD-MCNC: 8.8 MG/DL (ref 8.7–10.6)
CHLORIDE SERPL-SCNC: 99 MMOL/L (ref 98–112)
CO2 SERPL-SCNC: 29 MMOL/L (ref 21–32)
CREAT BLD-MCNC: 1.26 MG/DL (ref 0.55–1.02)
EGFRCR SERPLBLD CKD-EPI 2021: 39 ML/MIN/1.73M2 (ref 60–?)
GLUCOSE BLD-MCNC: 131 MG/DL (ref 70–99)
OSMOLALITY SERPL CALC.SUM OF ELEC: 291 MOSM/KG (ref 275–295)
POTASSIUM SERPL-SCNC: 2.8 MMOL/L (ref 3.5–5.1)
POTASSIUM SERPL-SCNC: 3.6 MMOL/L (ref 3.5–5.1)
SODIUM SERPL-SCNC: 139 MMOL/L (ref 136–145)

## 2025-07-26 PROCEDURE — 99232 SBSQ HOSP IP/OBS MODERATE 35: CPT | Performed by: INTERNAL MEDICINE

## 2025-07-26 RX ORDER — FUROSEMIDE 10 MG/ML
40 INJECTION INTRAMUSCULAR; INTRAVENOUS DAILY
Status: DISCONTINUED | OUTPATIENT
Start: 2025-07-27 | End: 2025-07-26

## 2025-07-26 RX ORDER — POTASSIUM CHLORIDE 1500 MG/1
40 TABLET, EXTENDED RELEASE ORAL EVERY 4 HOURS
Status: COMPLETED | OUTPATIENT
Start: 2025-07-26 | End: 2025-07-26

## 2025-07-26 RX ORDER — BUMETANIDE 0.25 MG/ML
2 INJECTION, SOLUTION INTRAMUSCULAR; INTRAVENOUS
Status: DISCONTINUED | OUTPATIENT
Start: 2025-07-26 | End: 2025-07-27

## 2025-07-26 RX ORDER — POTASSIUM CHLORIDE 1500 MG/1
40 TABLET, EXTENDED RELEASE ORAL ONCE
Status: COMPLETED | OUTPATIENT
Start: 2025-07-26 | End: 2025-07-26

## 2025-07-26 RX ORDER — FUROSEMIDE 10 MG/ML
20 INJECTION INTRAMUSCULAR; INTRAVENOUS
Status: DISCONTINUED | OUTPATIENT
Start: 2025-07-26 | End: 2025-07-26

## 2025-07-27 ENCOUNTER — APPOINTMENT (OUTPATIENT)
Dept: GENERAL RADIOLOGY | Facility: HOSPITAL | Age: OVER 89
End: 2025-07-27

## 2025-07-27 LAB
ANION GAP SERPL CALC-SCNC: 9 MMOL/L (ref 0–18)
BUN BLD-MCNC: 16 MG/DL (ref 9–23)
CALCIUM BLD-MCNC: 9.3 MG/DL (ref 8.7–10.6)
CHLORIDE SERPL-SCNC: 103 MMOL/L (ref 98–112)
CO2 SERPL-SCNC: 29 MMOL/L (ref 21–32)
CREAT BLD-MCNC: 1.42 MG/DL (ref 0.55–1.02)
EGFRCR SERPLBLD CKD-EPI 2021: 34 ML/MIN/1.73M2 (ref 60–?)
GLUCOSE BLD-MCNC: 126 MG/DL (ref 70–99)
MAGNESIUM SERPL-MCNC: 1.6 MG/DL (ref 1.6–2.6)
OSMOLALITY SERPL CALC.SUM OF ELEC: 295 MOSM/KG (ref 275–295)
POTASSIUM SERPL-SCNC: 4.1 MMOL/L (ref 3.5–5.1)
SODIUM SERPL-SCNC: 141 MMOL/L (ref 136–145)

## 2025-07-27 PROCEDURE — 71045 X-RAY EXAM CHEST 1 VIEW: CPT

## 2025-07-27 PROCEDURE — 99232 SBSQ HOSP IP/OBS MODERATE 35: CPT | Performed by: INTERNAL MEDICINE

## 2025-07-27 RX ORDER — SPIRONOLACTONE 25 MG/1
25 TABLET ORAL ONCE
Status: COMPLETED | OUTPATIENT
Start: 2025-07-27 | End: 2025-07-27

## 2025-07-27 RX ORDER — MAGNESIUM OXIDE 400 MG/1
400 TABLET ORAL 2 TIMES DAILY WITH MEALS
Status: COMPLETED | OUTPATIENT
Start: 2025-07-27 | End: 2025-07-27

## 2025-07-27 RX ORDER — BUMETANIDE 0.25 MG/ML
2 INJECTION, SOLUTION INTRAMUSCULAR; INTRAVENOUS
Status: DISCONTINUED | OUTPATIENT
Start: 2025-07-27 | End: 2025-07-28

## 2025-07-27 RX ORDER — MAGNESIUM OXIDE 400 MG/1
400 TABLET ORAL ONCE
Status: COMPLETED | OUTPATIENT
Start: 2025-07-27 | End: 2025-07-27

## 2025-07-27 RX ORDER — BUMETANIDE 1 MG/1
1 TABLET ORAL
Status: DISCONTINUED | OUTPATIENT
Start: 2025-07-27 | End: 2025-07-27

## 2025-07-27 RX ORDER — METOPROLOL TARTRATE 25 MG/1
25 TABLET, FILM COATED ORAL
Status: DISCONTINUED | OUTPATIENT
Start: 2025-07-27 | End: 2025-07-27

## 2025-07-27 RX ORDER — METOPROLOL TARTRATE 25 MG/1
25 TABLET, FILM COATED ORAL
Status: DISCONTINUED | OUTPATIENT
Start: 2025-07-27 | End: 2025-07-28

## 2025-07-27 RX ORDER — DILTIAZEM HYDROCHLORIDE 30 MG/1
30 TABLET, FILM COATED ORAL EVERY 6 HOURS SCHEDULED
Status: DISCONTINUED | OUTPATIENT
Start: 2025-07-27 | End: 2025-07-28

## 2025-07-28 LAB
ANION GAP SERPL CALC-SCNC: 10 MMOL/L (ref 0–18)
BUN BLD-MCNC: 15 MG/DL (ref 9–23)
CALCIUM BLD-MCNC: 9.5 MG/DL (ref 8.7–10.6)
CHLORIDE SERPL-SCNC: 97 MMOL/L (ref 98–112)
CO2 SERPL-SCNC: 31 MMOL/L (ref 21–32)
CREAT BLD-MCNC: 1.54 MG/DL (ref 0.55–1.02)
EGFRCR SERPLBLD CKD-EPI 2021: 31 ML/MIN/1.73M2 (ref 60–?)
GLUCOSE BLD-MCNC: 118 MG/DL (ref 70–99)
MAGNESIUM SERPL-MCNC: 1.9 MG/DL (ref 1.6–2.6)
NT-PROBNP SERPL-MCNC: 3064 PG/ML (ref ?–450)
OSMOLALITY SERPL CALC.SUM OF ELEC: 288 MOSM/KG (ref 275–295)
POTASSIUM SERPL-SCNC: 3.5 MMOL/L (ref 3.5–5.1)
SODIUM SERPL-SCNC: 138 MMOL/L (ref 136–145)

## 2025-07-28 PROCEDURE — 99239 HOSP IP/OBS DSCHRG MGMT >30: CPT | Performed by: INTERNAL MEDICINE

## 2025-07-28 RX ORDER — FUROSEMIDE 20 MG/1
20 TABLET ORAL DAILY
Status: DISCONTINUED | OUTPATIENT
Start: 2025-07-29 | End: 2025-07-28

## 2025-07-28 RX ORDER — METOPROLOL TARTRATE 25 MG/1
25 TABLET, FILM COATED ORAL
Qty: 60 TABLET | Refills: 3 | Status: SHIPPED | OUTPATIENT
Start: 2025-07-28

## 2025-07-28 RX ORDER — FUROSEMIDE 20 MG/1
20 TABLET ORAL 2 TIMES DAILY
Qty: 60 TABLET | Refills: 0 | Status: SHIPPED | OUTPATIENT
Start: 2025-07-28

## 2025-07-28 RX ORDER — FUROSEMIDE 40 MG/1
40 TABLET ORAL DAILY
Status: DISCONTINUED | OUTPATIENT
Start: 2025-07-29 | End: 2025-07-28

## 2025-08-12 VITALS
HEIGHT: 61 IN | OXYGEN SATURATION: 100 % | DIASTOLIC BLOOD PRESSURE: 77 MMHG | SYSTOLIC BLOOD PRESSURE: 130 MMHG | BODY MASS INDEX: 21.9 KG/M2 | RESPIRATION RATE: 30 BRPM | WEIGHT: 116 LBS | HEART RATE: 97 BPM | TEMPERATURE: 98 F